# Patient Record
Sex: MALE | Race: WHITE | NOT HISPANIC OR LATINO | Employment: OTHER | URBAN - NONMETROPOLITAN AREA
[De-identification: names, ages, dates, MRNs, and addresses within clinical notes are randomized per-mention and may not be internally consistent; named-entity substitution may affect disease eponyms.]

---

## 2017-04-12 ENCOUNTER — OFFICE VISIT (OUTPATIENT)
Dept: CARDIOLOGY | Facility: CLINIC | Age: 72
End: 2017-04-12

## 2017-04-12 VITALS
BODY MASS INDEX: 29.92 KG/M2 | WEIGHT: 209 LBS | HEIGHT: 70 IN | HEART RATE: 86 BPM | OXYGEN SATURATION: 98 % | DIASTOLIC BLOOD PRESSURE: 82 MMHG | SYSTOLIC BLOOD PRESSURE: 138 MMHG

## 2017-04-12 DIAGNOSIS — I10 ESSENTIAL HYPERTENSION: Primary | ICD-10-CM

## 2017-04-12 DIAGNOSIS — G54.0 THORACIC OUTLET SYNDROME: ICD-10-CM

## 2017-04-12 DIAGNOSIS — G45.4 TRANSIENT GLOBAL AMNESIA: ICD-10-CM

## 2017-04-12 DIAGNOSIS — R06.09 DOE (DYSPNEA ON EXERTION): ICD-10-CM

## 2017-04-12 DIAGNOSIS — R06.02 SHORTNESS OF BREATH: ICD-10-CM

## 2017-04-12 DIAGNOSIS — Z82.49 FAMILY HISTORY OF EARLY CAD: ICD-10-CM

## 2017-04-12 PROCEDURE — 99204 OFFICE O/P NEW MOD 45 MIN: CPT | Performed by: INTERNAL MEDICINE

## 2017-04-12 PROCEDURE — 93000 ELECTROCARDIOGRAM COMPLETE: CPT | Performed by: INTERNAL MEDICINE

## 2017-04-12 RX ORDER — CLOPIDOGREL BISULFATE 75 MG/1
75 TABLET ORAL DAILY
COMMUNITY

## 2017-04-12 RX ORDER — PANTOPRAZOLE SODIUM 40 MG/1
40 TABLET, DELAYED RELEASE ORAL DAILY
Status: ON HOLD | COMMUNITY
End: 2022-08-06

## 2017-04-12 RX ORDER — VALSARTAN 80 MG/1
80 TABLET ORAL DAILY
Status: ON HOLD | COMMUNITY
End: 2022-08-05

## 2017-04-12 NOTE — PROGRESS NOTES
Gallo Philip  [id number]  1945  72 y.o.  male    Referring Provider: Javier Gan MD    Reason for  Visit: Shortness of breath    C/o B/L arm heaviness on exertion mostly on raising his arms up  Overall doing well  Denies any chest pain  No excessive shortness of breath  Compliant with medications    History of present illness:  Gallo Philip is a 72 y.o. yo male with history of HTN  who presents today for   Chief Complaint   Patient presents with   • Shortness of Breath     NEW PT    • Fatigue   .    History  Past Medical History:   Diagnosis Date   • Hypertension    • Shortness of breath    • Stroke     2013 - ON PLAVIX   ,   Past Surgical History:   Procedure Laterality Date   • APPENDECTOMY     • KIDNEY STONE SURGERY     ,   Family History   Problem Relation Age of Onset   • No Known Problems Mother    • Heart attack Father    • Heart disease Father    ,   Social History   Substance Use Topics   • Smoking status: Former Smoker   • Smokeless tobacco: None   • Alcohol use No   ,     Medications  Current Outpatient Prescriptions   Medication Sig Dispense Refill   • aspirin 81 MG tablet Take 81 mg by mouth Daily.     • clopidogrel (PLAVIX) 75 MG tablet Take 75 mg by mouth Daily.     • pantoprazole (PROTONIX) 40 MG EC tablet Take 40 mg by mouth Daily.     • Triamterene-HCTZ (DYAZIDE PO) Take  by mouth.     • valsartan (DIOVAN) 80 MG tablet Take 80 mg by mouth Daily.       No current facility-administered medications for this visit.        Allergies:  Review of patient's allergies indicates no known allergies.    Review of Systems  Review of Systems   Constitution: Negative.   HENT: Negative.    Eyes: Negative.    Cardiovascular: Positive for dyspnea on exertion. Negative for chest pain, claudication, cyanosis, irregular heartbeat, leg swelling, near-syncope, orthopnea, palpitations, paroxysmal nocturnal dyspnea and syncope.   Respiratory: Negative.    Endocrine: Negative.    Hematologic/Lymphatic: Negative.   "  Skin: Negative.    Musculoskeletal: Positive for arthritis and joint pain.   Gastrointestinal: Negative for anorexia.   Genitourinary: Negative.    Neurological: Negative.    Psychiatric/Behavioral: Negative.        Objective     Physical Exam:  /82  Pulse 86  Ht 70\" (177.8 cm)  Wt 209 lb (94.8 kg)  SpO2 98%  BMI 29.99 kg/m2  Physical Exam   Constitutional: He appears well-developed.   HENT:   Head: Normocephalic.   Neck: Normal carotid pulses and no JVD present. No tracheal tenderness present. Carotid bruit is not present. No tracheal deviation and no edema present.   Cardiovascular: Regular rhythm, normal heart sounds and normal pulses.    Pulmonary/Chest: Effort normal. No stridor.   Abdominal: Soft.   Neurological: He is alert. He has normal strength. No cranial nerve deficit or sensory deficit.   Skin: Skin is warm.   Psychiatric: He has a normal mood and affect. His speech is normal and behavior is normal.       Results Review:       ECG 12 Lead  Date/Time: 4/12/2017 8:42 AM  Performed by: SHANTHI TORRE  Authorized by: SHANTHI TORRE   Comparison: not compared with previous ECG   Rhythm: sinus rhythm  Rate: normal  Conduction: conduction normal  ST Segments: ST segments normal  T Waves: T waves normal  QRS axis: normal  Clinical impression: normal ECG            Assessment/Plan   Patient Active Problem List   Diagnosis   • Hypertension   • Shortness of breath   • Family history of early CAD   • Transient global amnesia   • Thoracic outlet syndrome       No palpitations. No significant pedal edema. Compliant with medications and diet. Latest labs and medications reviewed.    Plan:  Will get echo and DSE  Will refer to vascular and neurology in future for likely thoracic outlet obstruction  Close follow up with you as scheduled.  Intensive factor modifications.  See order list.    Counseled regarding disease appropriate diet, fluid, caffeine, stimulants and sodium intake as well as importance of " compliance to diet, exercise and regular follow up.  Avoid NSAIDS and COX2 inhibitors. Use Acetaminophen PRN.    Return in about 4 weeks (around 5/10/2017).

## 2017-04-26 ENCOUNTER — HOSPITAL ENCOUNTER (OUTPATIENT)
Dept: CARDIOLOGY | Facility: HOSPITAL | Age: 72
Discharge: HOME | End: 2017-04-26
Attending: INTERNAL MEDICINE

## 2017-04-26 ENCOUNTER — HOSPITAL ENCOUNTER (OUTPATIENT)
Dept: CARDIOLOGY | Facility: HOSPITAL | Age: 72
Discharge: HOME | End: 2017-04-26
Attending: INTERNAL MEDICINE | Admitting: INTERNAL MEDICINE

## 2017-04-26 VITALS — DIASTOLIC BLOOD PRESSURE: 75 MMHG | SYSTOLIC BLOOD PRESSURE: 135 MMHG | HEART RATE: 73 BPM

## 2017-04-26 VITALS
HEIGHT: 70 IN | WEIGHT: 209 LBS | DIASTOLIC BLOOD PRESSURE: 72 MMHG | SYSTOLIC BLOOD PRESSURE: 126 MMHG | BODY MASS INDEX: 29.92 KG/M2

## 2017-04-26 DIAGNOSIS — R06.09 DOE (DYSPNEA ON EXERTION): ICD-10-CM

## 2017-04-26 PROCEDURE — 93018 CV STRESS TEST I&R ONLY: CPT | Performed by: INTERNAL MEDICINE

## 2017-04-26 PROCEDURE — 93352 ADMIN ECG CONTRAST AGENT: CPT | Performed by: INTERNAL MEDICINE

## 2017-04-26 PROCEDURE — C8928 TTE W OR W/O FOL W/CON,STRES: HCPCS

## 2017-04-26 PROCEDURE — 93017 CV STRESS TEST TRACING ONLY: CPT

## 2017-04-26 PROCEDURE — 25010000003 DOBUTAMINE PER 250 MG: Performed by: INTERNAL MEDICINE

## 2017-04-26 PROCEDURE — C8929 TTE W OR WO FOL WCON,DOPPLER: HCPCS

## 2017-04-26 PROCEDURE — 25010000002 PERFLUTREN (DEFINITY) 8.476 MG IN SODIUM CHLORIDE 10 ML INJECTION: Performed by: INTERNAL MEDICINE

## 2017-04-26 PROCEDURE — 93350 STRESS TTE ONLY: CPT | Performed by: INTERNAL MEDICINE

## 2017-04-26 RX ORDER — DOBUTAMINE HYDROCHLORIDE 100 MG/100ML
10-50 INJECTION INTRAVENOUS
Status: DISCONTINUED | OUTPATIENT
Start: 2017-04-26 | End: 2017-04-27 | Stop reason: HOSPADM

## 2017-04-26 RX ADMIN — DOBUTAMINE HYDROCHLORIDE 10 MCG/KG/MIN: 100 INJECTION INTRAVENOUS at 10:21

## 2017-04-26 RX ADMIN — SODIUM CHLORIDE 10 ML: 9 INJECTION INTRAMUSCULAR; INTRAVENOUS; SUBCUTANEOUS at 09:40

## 2017-04-27 LAB
BH CV STRESS BP STAGE 1: NORMAL
BH CV STRESS BP STAGE 2: NORMAL
BH CV STRESS DOSE DOBUTAMINE STAGE 1: 10
BH CV STRESS DOSE DOBUTAMINE STAGE 2: 20
BH CV STRESS DURATION MIN STAGE 1: 3
BH CV STRESS DURATION MIN STAGE 2: 3
BH CV STRESS DURATION SEC STAGE 1: 0
BH CV STRESS DURATION SEC STAGE 2: 54
BH CV STRESS ECHO POST STRESS EJECTION FRACTION EF: 60 %
BH CV STRESS HR STAGE 1: 93
BH CV STRESS HR STAGE 2: 131
BH CV STRESS PROTOCOL 1: NORMAL
BH CV STRESS RECOVERY BP: NORMAL MMHG
BH CV STRESS RECOVERY HR: 96 BPM
BH CV STRESS STAGE 1: 1
BH CV STRESS STAGE 2: 2
LV EF 2D ECHO EST: 55 %
MAXIMAL PREDICTED HEART RATE: 150 BPM
PERCENT MAX PREDICTED HR: 87.33 %
STRESS BASELINE BP: NORMAL MMHG
STRESS BASELINE HR: 73 BPM
STRESS PERCENT HR: 103 %
STRESS POST EXERCISE DUR MIN: 6 MIN
STRESS POST EXERCISE DUR SEC: 54 SEC
STRESS POST PEAK BP: NORMAL MMHG
STRESS POST PEAK HR: 131 BPM
STRESS TARGET HR: 128 BPM

## 2017-04-29 LAB
BH CV ECHO MEAS - AO MAX PG (FULL): 5.4 MMHG
BH CV ECHO MEAS - AO MAX PG: 10.4 MMHG
BH CV ECHO MEAS - AO MEAN PG (FULL): 3 MMHG
BH CV ECHO MEAS - AO MEAN PG: 6 MMHG
BH CV ECHO MEAS - AO ROOT AREA (BSA CORRECTED): 1.6
BH CV ECHO MEAS - AO ROOT AREA: 8.6 CM^2
BH CV ECHO MEAS - AO ROOT DIAM: 3.3 CM
BH CV ECHO MEAS - AO V2 MAX: 161 CM/SEC
BH CV ECHO MEAS - AO V2 MEAN: 112 CM/SEC
BH CV ECHO MEAS - AO V2 VTI: 33.1 CM
BH CV ECHO MEAS - AVA(I,A): 2.7 CM^2
BH CV ECHO MEAS - AVA(I,D): 2.7 CM^2
BH CV ECHO MEAS - AVA(V,A): 2.9 CM^2
BH CV ECHO MEAS - AVA(V,D): 2.9 CM^2
BH CV ECHO MEAS - BSA(HAYCOCK): 2.2 M^2
BH CV ECHO MEAS - BSA: 2.1 M^2
BH CV ECHO MEAS - BZI_BMI: 30 KILOGRAMS/M^2
BH CV ECHO MEAS - BZI_METRIC_HEIGHT: 177.8 CM
BH CV ECHO MEAS - BZI_METRIC_WEIGHT: 94.8 KG
BH CV ECHO MEAS - CONTRAST EF 4CH: 61.3 ML/M^2
BH CV ECHO MEAS - EDV(CUBED): 64 ML
BH CV ECHO MEAS - EDV(MOD-SP4): 98.8 ML
BH CV ECHO MEAS - EDV(TEICH): 70 ML
BH CV ECHO MEAS - EF(CUBED): 57.8 %
BH CV ECHO MEAS - EF(MOD-SP4): 61.3 %
BH CV ECHO MEAS - EF(TEICH): 50 %
BH CV ECHO MEAS - ESV(CUBED): 27 ML
BH CV ECHO MEAS - ESV(MOD-SP4): 38.2 ML
BH CV ECHO MEAS - ESV(TEICH): 35 ML
BH CV ECHO MEAS - FS: 25 %
BH CV ECHO MEAS - IVS/LVPW: 1
BH CV ECHO MEAS - IVSD: 1.2 CM
BH CV ECHO MEAS - LA DIMENSION: 3.6 CM
BH CV ECHO MEAS - LA/AO: 1.1
BH CV ECHO MEAS - LAT PEAK E' VEL: 9.6 CM/SEC
BH CV ECHO MEAS - LV DIASTOLIC VOL/BSA (35-75): 46.5 ML/M^2
BH CV ECHO MEAS - LV MASS(C)D: 165.5 GRAMS
BH CV ECHO MEAS - LV MASS(C)DI: 77.8 GRAMS/M^2
BH CV ECHO MEAS - LV MAX PG: 5 MMHG
BH CV ECHO MEAS - LV MEAN PG: 3 MMHG
BH CV ECHO MEAS - LV SYSTOLIC VOL/BSA (12-30): 18 ML/M^2
BH CV ECHO MEAS - LV V1 MAX: 112 CM/SEC
BH CV ECHO MEAS - LV V1 MEAN: 76.2 CM/SEC
BH CV ECHO MEAS - LV V1 VTI: 21.9 CM
BH CV ECHO MEAS - LVIDD: 4 CM
BH CV ECHO MEAS - LVIDS: 3 CM
BH CV ECHO MEAS - LVLD AP4: 7.9 CM
BH CV ECHO MEAS - LVLS AP4: 6.3 CM
BH CV ECHO MEAS - LVOT AREA (M): 4.2 CM^2
BH CV ECHO MEAS - LVOT AREA: 4.2 CM^2
BH CV ECHO MEAS - LVOT DIAM: 2.3 CM
BH CV ECHO MEAS - LVPWD: 1.2 CM
BH CV ECHO MEAS - MED PEAK E' VEL: 5.11 CM/SEC
BH CV ECHO MEAS - MV A MAX VEL: 97 CM/SEC
BH CV ECHO MEAS - MV DEC TIME: 0.27 SEC
BH CV ECHO MEAS - MV E MAX VEL: 78.6 CM/SEC
BH CV ECHO MEAS - MV E/A: 0.81
BH CV ECHO MEAS - RAP SYSTOLE: 5 MMHG
BH CV ECHO MEAS - RVSP: 12.2 MMHG
BH CV ECHO MEAS - SI(AO): 133.1 ML/M^2
BH CV ECHO MEAS - SI(CUBED): 17.4 ML/M^2
BH CV ECHO MEAS - SI(LVOT): 42.8 ML/M^2
BH CV ECHO MEAS - SI(MOD-SP4): 28.5 ML/M^2
BH CV ECHO MEAS - SI(TEICH): 16.5 ML/M^2
BH CV ECHO MEAS - SV(AO): 283.1 ML
BH CV ECHO MEAS - SV(CUBED): 37 ML
BH CV ECHO MEAS - SV(LVOT): 91 ML
BH CV ECHO MEAS - SV(MOD-SP4): 60.6 ML
BH CV ECHO MEAS - SV(TEICH): 35 ML
BH CV ECHO MEAS - TR MAX VEL: 134 CM/SEC
E/E' RATIO: 15.4
LEFT ATRIUM VOLUME INDEX: 32 ML/M2
LEFT ATRIUM VOLUME: 68.1 CM3
LV EF 2D ECHO EST: 60 %

## 2017-05-12 ENCOUNTER — OFFICE VISIT (OUTPATIENT)
Dept: CARDIOLOGY | Facility: CLINIC | Age: 72
End: 2017-05-12

## 2017-05-12 VITALS
HEIGHT: 70 IN | OXYGEN SATURATION: 98 % | WEIGHT: 207 LBS | HEART RATE: 67 BPM | SYSTOLIC BLOOD PRESSURE: 114 MMHG | BODY MASS INDEX: 29.63 KG/M2 | DIASTOLIC BLOOD PRESSURE: 72 MMHG

## 2017-05-12 DIAGNOSIS — G54.0 THORACIC OUTLET SYNDROME: ICD-10-CM

## 2017-05-12 DIAGNOSIS — I10 ESSENTIAL HYPERTENSION: Primary | ICD-10-CM

## 2017-05-12 DIAGNOSIS — Z82.49 FAMILY HISTORY OF EARLY CAD: ICD-10-CM

## 2017-05-12 DIAGNOSIS — G45.4 TRANSIENT GLOBAL AMNESIA: ICD-10-CM

## 2017-05-12 DIAGNOSIS — R06.02 SHORTNESS OF BREATH: ICD-10-CM

## 2017-05-12 PROCEDURE — 99214 OFFICE O/P EST MOD 30 MIN: CPT | Performed by: INTERNAL MEDICINE

## 2017-12-21 ENCOUNTER — HOSPITAL ENCOUNTER (OUTPATIENT)
Dept: GENERAL RADIOLOGY | Age: 72
Discharge: HOME | End: 2017-12-21
Payer: MEDICARE

## 2017-12-21 ENCOUNTER — HOSPITAL ENCOUNTER (OUTPATIENT)
Dept: PREADMISSION TESTING | Age: 72
Discharge: HOME | End: 2017-12-21
Payer: MEDICARE

## 2017-12-21 VITALS — WEIGHT: 210 LBS | HEIGHT: 70 IN | BODY MASS INDEX: 30.06 KG/M2

## 2017-12-21 LAB
ALBUMIN SERPL-MCNC: 4.4 G/DL (ref 3.5–5.2)
ALP BLD-CCNC: 88 U/L (ref 40–130)
ALT SERPL-CCNC: 29 U/L (ref 5–41)
ANION GAP SERPL CALCULATED.3IONS-SCNC: 16 MMOL/L (ref 7–19)
APTT: 25 SEC (ref 26–36.2)
AST SERPL-CCNC: 21 U/L (ref 5–40)
BASOPHILS ABSOLUTE: 0.1 K/UL (ref 0–0.2)
BASOPHILS RELATIVE PERCENT: 0.9 % (ref 0–1)
BILIRUB SERPL-MCNC: 0.3 MG/DL (ref 0.2–1.2)
BILIRUBIN URINE: NEGATIVE
BLOOD, URINE: NEGATIVE
BUN BLDV-MCNC: 36 MG/DL (ref 8–23)
CALCIUM SERPL-MCNC: 9.8 MG/DL (ref 8.8–10.2)
CHLORIDE BLD-SCNC: 100 MMOL/L (ref 98–111)
CLARITY: CLEAR
CO2: 23 MMOL/L (ref 22–29)
COLOR: YELLOW
CREAT SERPL-MCNC: 1.7 MG/DL (ref 0.5–1.2)
EOSINOPHILS ABSOLUTE: 0.7 K/UL (ref 0–0.6)
EOSINOPHILS RELATIVE PERCENT: 5.8 % (ref 0–5)
GFR NON-AFRICAN AMERICAN: 40
GLUCOSE BLD-MCNC: 118 MG/DL (ref 74–109)
GLUCOSE URINE: NEGATIVE MG/DL
HCT VFR BLD CALC: 45.8 % (ref 42–52)
HEMOGLOBIN: 15.2 G/DL (ref 14–18)
INR BLD: 0.89 (ref 0.88–1.18)
KETONES, URINE: NEGATIVE MG/DL
LEUKOCYTE ESTERASE, URINE: NEGATIVE
LYMPHOCYTES ABSOLUTE: 3.2 K/UL (ref 1.1–4.5)
LYMPHOCYTES RELATIVE PERCENT: 26.7 % (ref 20–40)
MCH RBC QN AUTO: 31.5 PG (ref 27–31)
MCHC RBC AUTO-ENTMCNC: 33.2 G/DL (ref 33–37)
MCV RBC AUTO: 94.8 FL (ref 80–94)
MONOCYTES ABSOLUTE: 1.1 K/UL (ref 0–0.9)
MONOCYTES RELATIVE PERCENT: 9.5 % (ref 0–10)
NEUTROPHILS ABSOLUTE: 6.8 K/UL (ref 1.5–7.5)
NEUTROPHILS RELATIVE PERCENT: 56.8 % (ref 50–65)
NITRITE, URINE: NEGATIVE
PDW BLD-RTO: 12 % (ref 11.5–14.5)
PH UA: 5.5
PLATELET # BLD: 339 K/UL (ref 130–400)
PMV BLD AUTO: 10.7 FL (ref 9.4–12.4)
POTASSIUM SERPL-SCNC: 3.8 MMOL/L (ref 3.5–5)
PROTEIN UA: NEGATIVE MG/DL
PROTHROMBIN TIME: 11.9 SEC (ref 12–14.6)
RBC # BLD: 4.83 M/UL (ref 4.7–6.1)
SODIUM BLD-SCNC: 139 MMOL/L (ref 136–145)
SPECIFIC GRAVITY UA: 1.02
TOTAL PROTEIN: 7.6 G/DL (ref 6.6–8.7)
UROBILINOGEN, URINE: 0.2 E.U./DL
WBC # BLD: 12 K/UL (ref 4.8–10.8)

## 2017-12-21 PROCEDURE — 85730 THROMBOPLASTIN TIME PARTIAL: CPT

## 2017-12-21 PROCEDURE — 93005 ELECTROCARDIOGRAM TRACING: CPT

## 2017-12-21 PROCEDURE — 85610 PROTHROMBIN TIME: CPT

## 2017-12-21 PROCEDURE — 85025 COMPLETE CBC W/AUTO DIFF WBC: CPT

## 2017-12-21 PROCEDURE — 80053 COMPREHEN METABOLIC PANEL: CPT

## 2017-12-21 PROCEDURE — 81003 URINALYSIS AUTO W/O SCOPE: CPT

## 2017-12-21 PROCEDURE — 71020 XR CHEST STANDARD TWO VW: CPT

## 2017-12-21 PROCEDURE — 87070 CULTURE OTHR SPECIMN AEROBIC: CPT

## 2017-12-21 RX ORDER — CELECOXIB 200 MG/1
200 CAPSULE ORAL ONCE
Status: CANCELLED | OUTPATIENT
Start: 2018-01-03

## 2017-12-21 RX ORDER — DEXAMETHASONE SODIUM PHOSPHATE 10 MG/ML
10 INJECTION INTRAMUSCULAR; INTRAVENOUS ONCE
Status: CANCELLED | OUTPATIENT
Start: 2018-01-03

## 2017-12-21 RX ORDER — VALSARTAN 80 MG/1
80 TABLET ORAL DAILY
COMMUNITY
End: 2021-04-30

## 2017-12-21 RX ORDER — PREGABALIN 75 MG/1
75 CAPSULE ORAL ONCE
Status: CANCELLED | OUTPATIENT
Start: 2018-01-03

## 2017-12-21 RX ORDER — CLOPIDOGREL BISULFATE 75 MG/1
75 TABLET ORAL DAILY
Status: ON HOLD | COMMUNITY
End: 2018-01-05 | Stop reason: HOSPADM

## 2017-12-21 RX ORDER — PANTOPRAZOLE SODIUM 40 MG/1
40 TABLET, DELAYED RELEASE ORAL DAILY
COMMUNITY
End: 2021-04-30

## 2017-12-21 RX ORDER — OXYCODONE HCL 10 MG/1
10 TABLET, FILM COATED, EXTENDED RELEASE ORAL ONCE
Status: CANCELLED | OUTPATIENT
Start: 2018-01-03

## 2017-12-21 RX ORDER — ACETAMINOPHEN 500 MG
1000 TABLET ORAL ONCE
Status: CANCELLED | OUTPATIENT
Start: 2018-01-03

## 2017-12-22 LAB
EKG P AXIS: 61 DEGREES
EKG P-R INTERVAL: 182 MS
EKG Q-T INTERVAL: 362 MS
EKG QRS DURATION: 86 MS
EKG QTC CALCULATION (BAZETT): 388 MS
EKG T AXIS: 71 DEGREES
MRSA CULTURE ONLY: NORMAL

## 2018-01-01 PROBLEM — M17.12 PRIMARY OSTEOARTHRITIS OF LEFT KNEE: Status: ACTIVE | Noted: 2018-01-01

## 2018-01-03 ENCOUNTER — ANESTHESIA EVENT (OUTPATIENT)
Dept: OPERATING ROOM | Age: 73
DRG: 470 | End: 2018-01-03
Payer: MEDICARE

## 2018-01-03 ENCOUNTER — ANESTHESIA (OUTPATIENT)
Dept: OPERATING ROOM | Age: 73
DRG: 470 | End: 2018-01-03
Payer: MEDICARE

## 2018-01-03 ENCOUNTER — APPOINTMENT (OUTPATIENT)
Dept: GENERAL RADIOLOGY | Age: 73
DRG: 470 | End: 2018-01-03
Attending: ORTHOPAEDIC SURGERY
Payer: MEDICARE

## 2018-01-03 ENCOUNTER — HOSPITAL ENCOUNTER (INPATIENT)
Age: 73
LOS: 3 days | Discharge: HOME HEALTH | DRG: 470 | End: 2018-01-06
Attending: ORTHOPAEDIC SURGERY | Admitting: ORTHOPAEDIC SURGERY
Payer: MEDICARE

## 2018-01-03 VITALS — SYSTOLIC BLOOD PRESSURE: 97 MMHG | OXYGEN SATURATION: 99 % | TEMPERATURE: 96.8 F | DIASTOLIC BLOOD PRESSURE: 58 MMHG

## 2018-01-03 PROBLEM — M17.10 ARTHRITIS OF KNEE: Status: ACTIVE | Noted: 2018-01-03

## 2018-01-03 LAB
ABO/RH: NORMAL
ANTIBODY SCREEN: NORMAL

## 2018-01-03 PROCEDURE — 3700000000 HC ANESTHESIA ATTENDED CARE: Performed by: ORTHOPAEDIC SURGERY

## 2018-01-03 PROCEDURE — C9290 INJ, BUPIVACAINE LIPOSOME: HCPCS | Performed by: ORTHOPAEDIC SURGERY

## 2018-01-03 PROCEDURE — 2720000001 HC MISC SURG SUPPLY STERILE $51-500: Performed by: ORTHOPAEDIC SURGERY

## 2018-01-03 PROCEDURE — 86901 BLOOD TYPING SEROLOGIC RH(D): CPT

## 2018-01-03 PROCEDURE — 73560 X-RAY EXAM OF KNEE 1 OR 2: CPT

## 2018-01-03 PROCEDURE — 7100000001 HC PACU RECOVERY - ADDTL 15 MIN: Performed by: ORTHOPAEDIC SURGERY

## 2018-01-03 PROCEDURE — 6360000002 HC RX W HCPCS: Performed by: ORTHOPAEDIC SURGERY

## 2018-01-03 PROCEDURE — 3700000001 HC ADD 15 MINUTES (ANESTHESIA): Performed by: ORTHOPAEDIC SURGERY

## 2018-01-03 PROCEDURE — C1776 JOINT DEVICE (IMPLANTABLE): HCPCS | Performed by: ORTHOPAEDIC SURGERY

## 2018-01-03 PROCEDURE — 64447 NJX AA&/STRD FEMORAL NRV IMG: CPT

## 2018-01-03 PROCEDURE — 2500000003 HC RX 250 WO HCPCS: Performed by: ORTHOPAEDIC SURGERY

## 2018-01-03 PROCEDURE — 2700000000 HC OXYGEN THERAPY PER DAY

## 2018-01-03 PROCEDURE — C1713 ANCHOR/SCREW BN/BN,TIS/BN: HCPCS | Performed by: ORTHOPAEDIC SURGERY

## 2018-01-03 PROCEDURE — 86850 RBC ANTIBODY SCREEN: CPT

## 2018-01-03 PROCEDURE — 6370000000 HC RX 637 (ALT 250 FOR IP): Performed by: ORTHOPAEDIC SURGERY

## 2018-01-03 PROCEDURE — 3E0U33Z INTRODUCTION OF ANTI-INFLAMMATORY INTO JOINTS, PERCUTANEOUS APPROACH: ICD-10-PCS | Performed by: ORTHOPAEDIC SURGERY

## 2018-01-03 PROCEDURE — 2780000010 HC IMPLANT OTHER: Performed by: ORTHOPAEDIC SURGERY

## 2018-01-03 PROCEDURE — 2500000003 HC RX 250 WO HCPCS

## 2018-01-03 PROCEDURE — 6360000002 HC RX W HCPCS: Performed by: ANESTHESIOLOGY

## 2018-01-03 PROCEDURE — 0SRD0J9 REPLACEMENT OF LEFT KNEE JOINT WITH SYNTHETIC SUBSTITUTE, CEMENTED, OPEN APPROACH: ICD-10-PCS | Performed by: ORTHOPAEDIC SURGERY

## 2018-01-03 PROCEDURE — 3600000005 HC SURGERY LEVEL 5 BASE: Performed by: ORTHOPAEDIC SURGERY

## 2018-01-03 PROCEDURE — 3600000015 HC SURGERY LEVEL 5 ADDTL 15MIN: Performed by: ORTHOPAEDIC SURGERY

## 2018-01-03 PROCEDURE — 94664 DEMO&/EVAL PT USE INHALER: CPT

## 2018-01-03 PROCEDURE — 2580000003 HC RX 258: Performed by: ORTHOPAEDIC SURGERY

## 2018-01-03 PROCEDURE — 6360000002 HC RX W HCPCS

## 2018-01-03 PROCEDURE — 36415 COLL VENOUS BLD VENIPUNCTURE: CPT

## 2018-01-03 PROCEDURE — 7100000000 HC PACU RECOVERY - FIRST 15 MIN: Performed by: ORTHOPAEDIC SURGERY

## 2018-01-03 PROCEDURE — 1210000000 HC MED SURG R&B

## 2018-01-03 PROCEDURE — 86900 BLOOD TYPING SEROLOGIC ABO: CPT

## 2018-01-03 DEVICE — COMPONENT ARTC SURF PS 10-11 EF 10 MM LT TIB FIX BEAR: Type: IMPLANTABLE DEVICE | Site: KNEE | Status: FUNCTIONAL

## 2018-01-03 DEVICE — Z DUP USE 2508610 EXTENSION STEM SZ F 5DEG L TIBL KNEE CEM NAT TIB PERSONA: Type: IMPLANTABLE DEVICE | Site: KNEE | Status: FUNCTIONAL

## 2018-01-03 DEVICE — IMPL KNEE PSN ALL POLY PAT 38MM: Type: IMPLANTABLE DEVICE | Site: KNEE | Status: FUNCTIONAL

## 2018-01-03 DEVICE — DISCONTINUED USE 415964 CEMENT PALACOS R + G SING DOSE 40GR: Type: IMPLANTABLE DEVICE | Site: KNEE | Status: FUNCTIONAL

## 2018-01-03 DEVICE — AGENT HEMSTAT HUM FBRN SEAL EVICEL KT: Type: IMPLANTABLE DEVICE | Site: KNEE | Status: FUNCTIONAL

## 2018-01-03 DEVICE — COMPONENT FEM SZ 11 NAR L KNEE CO CHROM CEM POST STBL MID: Type: IMPLANTABLE DEVICE | Site: KNEE | Status: FUNCTIONAL

## 2018-01-03 RX ORDER — MORPHINE SULFATE 4 MG/ML
4 INJECTION, SOLUTION INTRAMUSCULAR; INTRAVENOUS
Status: DISCONTINUED | OUTPATIENT
Start: 2018-01-03 | End: 2018-01-03 | Stop reason: HOSPADM

## 2018-01-03 RX ORDER — DIPHENHYDRAMINE HCL 25 MG
25 TABLET ORAL EVERY 6 HOURS PRN
Status: DISCONTINUED | OUTPATIENT
Start: 2018-01-03 | End: 2018-01-06 | Stop reason: HOSPADM

## 2018-01-03 RX ORDER — SODIUM CHLORIDE 0.9 % (FLUSH) 0.9 %
10 SYRINGE (ML) INJECTION PRN
Status: DISCONTINUED | OUTPATIENT
Start: 2018-01-03 | End: 2018-01-06 | Stop reason: HOSPADM

## 2018-01-03 RX ORDER — FENTANYL CITRATE 50 UG/ML
INJECTION, SOLUTION INTRAMUSCULAR; INTRAVENOUS PRN
Status: DISCONTINUED | OUTPATIENT
Start: 2018-01-03 | End: 2018-01-03 | Stop reason: SDUPTHER

## 2018-01-03 RX ORDER — DEXAMETHASONE SODIUM PHOSPHATE 10 MG/ML
10 INJECTION INTRAMUSCULAR; INTRAVENOUS ONCE
Status: DISCONTINUED | OUTPATIENT
Start: 2018-01-03 | End: 2018-01-03 | Stop reason: HOSPADM

## 2018-01-03 RX ORDER — 0.9 % SODIUM CHLORIDE 0.9 %
500 INTRAVENOUS SOLUTION INTRAVENOUS PRN
Status: DISCONTINUED | OUTPATIENT
Start: 2018-01-03 | End: 2018-01-06 | Stop reason: HOSPADM

## 2018-01-03 RX ORDER — FENTANYL CITRATE 50 UG/ML
75 INJECTION, SOLUTION INTRAMUSCULAR; INTRAVENOUS
Status: DISCONTINUED | OUTPATIENT
Start: 2018-01-03 | End: 2018-01-06 | Stop reason: HOSPADM

## 2018-01-03 RX ORDER — SODIUM CHLORIDE, SODIUM LACTATE, POTASSIUM CHLORIDE, CALCIUM CHLORIDE 600; 310; 30; 20 MG/100ML; MG/100ML; MG/100ML; MG/100ML
INJECTION, SOLUTION INTRAVENOUS CONTINUOUS
Status: DISCONTINUED | OUTPATIENT
Start: 2018-01-03 | End: 2018-01-06 | Stop reason: HOSPADM

## 2018-01-03 RX ORDER — OXYCODONE HYDROCHLORIDE 5 MG/1
10 TABLET ORAL EVERY 4 HOURS PRN
Status: DISCONTINUED | OUTPATIENT
Start: 2018-01-03 | End: 2018-01-06 | Stop reason: HOSPADM

## 2018-01-03 RX ORDER — DOCUSATE SODIUM 100 MG/1
100 CAPSULE, LIQUID FILLED ORAL 2 TIMES DAILY
Status: DISCONTINUED | OUTPATIENT
Start: 2018-01-03 | End: 2018-01-06 | Stop reason: HOSPADM

## 2018-01-03 RX ORDER — PROPOFOL 10 MG/ML
INJECTION, EMULSION INTRAVENOUS CONTINUOUS PRN
Status: DISCONTINUED | OUTPATIENT
Start: 2018-01-03 | End: 2018-01-03 | Stop reason: SDUPTHER

## 2018-01-03 RX ORDER — DIPHENHYDRAMINE HYDROCHLORIDE 50 MG/ML
12.5 INJECTION INTRAMUSCULAR; INTRAVENOUS
Status: DISCONTINUED | OUTPATIENT
Start: 2018-01-03 | End: 2018-01-03 | Stop reason: HOSPADM

## 2018-01-03 RX ORDER — OXYCODONE HYDROCHLORIDE 5 MG/1
20 TABLET ORAL EVERY 4 HOURS PRN
Status: DISCONTINUED | OUTPATIENT
Start: 2018-01-03 | End: 2018-01-06 | Stop reason: HOSPADM

## 2018-01-03 RX ORDER — HYDRALAZINE HYDROCHLORIDE 20 MG/ML
5 INJECTION INTRAMUSCULAR; INTRAVENOUS EVERY 10 MIN PRN
Status: DISCONTINUED | OUTPATIENT
Start: 2018-01-03 | End: 2018-01-03 | Stop reason: HOSPADM

## 2018-01-03 RX ORDER — LIDOCAINE HYDROCHLORIDE 10 MG/ML
1 INJECTION, SOLUTION EPIDURAL; INFILTRATION; INTRACAUDAL; PERINEURAL
Status: DISCONTINUED | OUTPATIENT
Start: 2018-01-03 | End: 2018-01-03 | Stop reason: HOSPADM

## 2018-01-03 RX ORDER — HYDROMORPHONE HCL 110MG/55ML
2 PATIENT CONTROLLED ANALGESIA SYRINGE INTRAVENOUS
Status: DISCONTINUED | OUTPATIENT
Start: 2018-01-03 | End: 2018-01-03 | Stop reason: ALTCHOICE

## 2018-01-03 RX ORDER — LABETALOL HYDROCHLORIDE 5 MG/ML
5 INJECTION, SOLUTION INTRAVENOUS EVERY 10 MIN PRN
Status: DISCONTINUED | OUTPATIENT
Start: 2018-01-03 | End: 2018-01-03 | Stop reason: HOSPADM

## 2018-01-03 RX ORDER — EPHEDRINE SULFATE 50 MG/ML
INJECTION, SOLUTION INTRAVENOUS PRN
Status: DISCONTINUED | OUTPATIENT
Start: 2018-01-03 | End: 2018-01-03 | Stop reason: SDUPTHER

## 2018-01-03 RX ORDER — PROMETHAZINE HYDROCHLORIDE 25 MG/ML
6.25 INJECTION, SOLUTION INTRAMUSCULAR; INTRAVENOUS
Status: DISCONTINUED | OUTPATIENT
Start: 2018-01-03 | End: 2018-01-03 | Stop reason: HOSPADM

## 2018-01-03 RX ORDER — BETAMETHASONE SODIUM PHOSPHATE AND BETAMETHASONE ACETATE 3; 3 MG/ML; MG/ML
INJECTION, SUSPENSION INTRA-ARTICULAR; INTRALESIONAL; INTRAMUSCULAR; SOFT TISSUE PRN
Status: DISCONTINUED | OUTPATIENT
Start: 2018-01-03 | End: 2018-01-03 | Stop reason: HOSPADM

## 2018-01-03 RX ORDER — CLINDAMYCIN PHOSPHATE 900 MG/50ML
900 INJECTION INTRAVENOUS EVERY 8 HOURS
Status: COMPLETED | OUTPATIENT
Start: 2018-01-03 | End: 2018-01-05

## 2018-01-03 RX ORDER — SODIUM CHLORIDE 0.9 % (FLUSH) 0.9 %
10 SYRINGE (ML) INJECTION EVERY 12 HOURS SCHEDULED
Status: DISCONTINUED | OUTPATIENT
Start: 2018-01-03 | End: 2018-01-03 | Stop reason: HOSPADM

## 2018-01-03 RX ORDER — SODIUM CHLORIDE 0.9 % (FLUSH) 0.9 %
10 SYRINGE (ML) INJECTION EVERY 12 HOURS SCHEDULED
Status: DISCONTINUED | OUTPATIENT
Start: 2018-01-03 | End: 2018-01-06 | Stop reason: HOSPADM

## 2018-01-03 RX ORDER — ACETAMINOPHEN 500 MG
1000 TABLET ORAL ONCE
Status: COMPLETED | OUTPATIENT
Start: 2018-01-03 | End: 2018-01-03

## 2018-01-03 RX ORDER — ROPIVACAINE HYDROCHLORIDE 5 MG/ML
INJECTION, SOLUTION EPIDURAL; INFILTRATION; PERINEURAL PRN
Status: DISCONTINUED | OUTPATIENT
Start: 2018-01-03 | End: 2018-01-03 | Stop reason: SDUPTHER

## 2018-01-03 RX ORDER — ENALAPRILAT 2.5 MG/2ML
1.25 INJECTION INTRAVENOUS
Status: DISCONTINUED | OUTPATIENT
Start: 2018-01-03 | End: 2018-01-03 | Stop reason: HOSPADM

## 2018-01-03 RX ORDER — PREGABALIN 75 MG/1
75 CAPSULE ORAL ONCE
Status: COMPLETED | OUTPATIENT
Start: 2018-01-03 | End: 2018-01-03

## 2018-01-03 RX ORDER — FENTANYL CITRATE 50 UG/ML
50 INJECTION, SOLUTION INTRAMUSCULAR; INTRAVENOUS
Status: DISCONTINUED | OUTPATIENT
Start: 2018-01-03 | End: 2018-01-03 | Stop reason: HOSPADM

## 2018-01-03 RX ORDER — ACETAMINOPHEN 500 MG
1000 TABLET ORAL EVERY 8 HOURS
Status: DISCONTINUED | OUTPATIENT
Start: 2018-01-03 | End: 2018-01-06 | Stop reason: HOSPADM

## 2018-01-03 RX ORDER — LIDOCAINE HYDROCHLORIDE 10 MG/ML
INJECTION, SOLUTION EPIDURAL; INFILTRATION; INTRACAUDAL; PERINEURAL PRN
Status: DISCONTINUED | OUTPATIENT
Start: 2018-01-03 | End: 2018-01-03 | Stop reason: SDUPTHER

## 2018-01-03 RX ORDER — DIAZEPAM 5 MG/1
5 TABLET ORAL EVERY 6 HOURS PRN
Status: DISCONTINUED | OUTPATIENT
Start: 2018-01-03 | End: 2018-01-06 | Stop reason: HOSPADM

## 2018-01-03 RX ORDER — TAMSULOSIN HYDROCHLORIDE 0.4 MG/1
0.4 CAPSULE ORAL DAILY
Status: DISCONTINUED | OUTPATIENT
Start: 2018-01-03 | End: 2018-01-06 | Stop reason: HOSPADM

## 2018-01-03 RX ORDER — OXYCODONE HCL 10 MG/1
10 TABLET, FILM COATED, EXTENDED RELEASE ORAL EVERY 12 HOURS SCHEDULED
Status: DISCONTINUED | OUTPATIENT
Start: 2018-01-03 | End: 2018-01-06 | Stop reason: HOSPADM

## 2018-01-03 RX ORDER — MEPERIDINE HYDROCHLORIDE 50 MG/ML
12.5 INJECTION INTRAMUSCULAR; INTRAVENOUS; SUBCUTANEOUS EVERY 5 MIN PRN
Status: DISCONTINUED | OUTPATIENT
Start: 2018-01-03 | End: 2018-01-03 | Stop reason: HOSPADM

## 2018-01-03 RX ORDER — OXYCODONE HCL 10 MG/1
10 TABLET, FILM COATED, EXTENDED RELEASE ORAL ONCE
Status: COMPLETED | OUTPATIENT
Start: 2018-01-03 | End: 2018-01-03

## 2018-01-03 RX ORDER — MORPHINE SULFATE 4 MG/ML
4 INJECTION, SOLUTION INTRAMUSCULAR; INTRAVENOUS EVERY 5 MIN PRN
Status: DISCONTINUED | OUTPATIENT
Start: 2018-01-03 | End: 2018-01-03 | Stop reason: HOSPADM

## 2018-01-03 RX ORDER — OXYCODONE HYDROCHLORIDE 5 MG/1
5 TABLET ORAL EVERY 4 HOURS PRN
Status: DISCONTINUED | OUTPATIENT
Start: 2018-01-03 | End: 2018-01-06 | Stop reason: HOSPADM

## 2018-01-03 RX ORDER — DEXAMETHASONE SODIUM PHOSPHATE 10 MG/ML
INJECTION INTRAMUSCULAR; INTRAVENOUS PRN
Status: DISCONTINUED | OUTPATIENT
Start: 2018-01-03 | End: 2018-01-03 | Stop reason: SDUPTHER

## 2018-01-03 RX ORDER — METOCLOPRAMIDE HYDROCHLORIDE 5 MG/ML
10 INJECTION INTRAMUSCULAR; INTRAVENOUS
Status: DISCONTINUED | OUTPATIENT
Start: 2018-01-03 | End: 2018-01-03 | Stop reason: HOSPADM

## 2018-01-03 RX ORDER — BUPIVACAINE HYDROCHLORIDE 7.5 MG/ML
INJECTION, SOLUTION INTRASPINAL PRN
Status: DISCONTINUED | OUTPATIENT
Start: 2018-01-03 | End: 2018-01-03 | Stop reason: SDUPTHER

## 2018-01-03 RX ORDER — ONDANSETRON 2 MG/ML
INJECTION INTRAMUSCULAR; INTRAVENOUS PRN
Status: DISCONTINUED | OUTPATIENT
Start: 2018-01-03 | End: 2018-01-03 | Stop reason: SDUPTHER

## 2018-01-03 RX ORDER — TRAMADOL HYDROCHLORIDE 50 MG/1
50 TABLET ORAL EVERY 6 HOURS
Status: DISCONTINUED | OUTPATIENT
Start: 2018-01-03 | End: 2018-01-06 | Stop reason: HOSPADM

## 2018-01-03 RX ORDER — PANTOPRAZOLE SODIUM 40 MG/1
40 TABLET, DELAYED RELEASE ORAL DAILY
Status: DISCONTINUED | OUTPATIENT
Start: 2018-01-03 | End: 2018-01-06 | Stop reason: HOSPADM

## 2018-01-03 RX ORDER — MORPHINE SULFATE 4 MG/ML
2 INJECTION, SOLUTION INTRAMUSCULAR; INTRAVENOUS EVERY 5 MIN PRN
Status: DISCONTINUED | OUTPATIENT
Start: 2018-01-03 | End: 2018-01-03 | Stop reason: HOSPADM

## 2018-01-03 RX ORDER — SCOLOPAMINE TRANSDERMAL SYSTEM 1 MG/1
1 PATCH, EXTENDED RELEASE TRANSDERMAL ONCE
Status: DISCONTINUED | OUTPATIENT
Start: 2018-01-03 | End: 2018-01-03 | Stop reason: HOSPADM

## 2018-01-03 RX ORDER — FENTANYL CITRATE 50 UG/ML
50 INJECTION, SOLUTION INTRAMUSCULAR; INTRAVENOUS
Status: DISCONTINUED | OUTPATIENT
Start: 2018-01-03 | End: 2018-01-06 | Stop reason: HOSPADM

## 2018-01-03 RX ORDER — BUPIVACAINE HYDROCHLORIDE 2.5 MG/ML
INJECTION, SOLUTION INFILTRATION; PERINEURAL PRN
Status: DISCONTINUED | OUTPATIENT
Start: 2018-01-03 | End: 2018-01-03 | Stop reason: HOSPADM

## 2018-01-03 RX ORDER — ONDANSETRON 2 MG/ML
4 INJECTION INTRAMUSCULAR; INTRAVENOUS EVERY 6 HOURS PRN
Status: DISCONTINUED | OUTPATIENT
Start: 2018-01-03 | End: 2018-01-06 | Stop reason: HOSPADM

## 2018-01-03 RX ORDER — MIDAZOLAM HYDROCHLORIDE 1 MG/ML
2 INJECTION INTRAMUSCULAR; INTRAVENOUS
Status: COMPLETED | OUTPATIENT
Start: 2018-01-03 | End: 2018-01-03

## 2018-01-03 RX ORDER — SODIUM CHLORIDE 0.9 % (FLUSH) 0.9 %
10 SYRINGE (ML) INJECTION PRN
Status: DISCONTINUED | OUTPATIENT
Start: 2018-01-03 | End: 2018-01-03 | Stop reason: HOSPADM

## 2018-01-03 RX ORDER — CELECOXIB 200 MG/1
200 CAPSULE ORAL ONCE
Status: DISCONTINUED | OUTPATIENT
Start: 2018-01-03 | End: 2018-01-03

## 2018-01-03 RX ORDER — TRIAMTERENE AND HYDROCHLOROTHIAZIDE 37.5; 25 MG/1; MG/1
1 CAPSULE ORAL DAILY
Status: DISCONTINUED | OUTPATIENT
Start: 2018-01-03 | End: 2018-01-06 | Stop reason: HOSPADM

## 2018-01-03 RX ORDER — SODIUM CHLORIDE 9 MG/ML
INJECTION, SOLUTION INTRAVENOUS CONTINUOUS
Status: DISCONTINUED | OUTPATIENT
Start: 2018-01-03 | End: 2018-01-06 | Stop reason: HOSPADM

## 2018-01-03 RX ORDER — FENTANYL CITRATE 50 UG/ML
100 INJECTION, SOLUTION INTRAMUSCULAR; INTRAVENOUS
Status: DISCONTINUED | OUTPATIENT
Start: 2018-01-03 | End: 2018-01-06 | Stop reason: HOSPADM

## 2018-01-03 RX ADMIN — ACETAMINOPHEN 1000 MG: 500 TABLET ORAL at 07:07

## 2018-01-03 RX ADMIN — EPHEDRINE SULFATE 10 MG: 50 INJECTION, SOLUTION INTRAMUSCULAR; INTRAVENOUS; SUBCUTANEOUS at 09:53

## 2018-01-03 RX ADMIN — ROPIVACAINE HYDROCHLORIDE 20 ML: 5 INJECTION, SOLUTION EPIDURAL; INFILTRATION; PERINEURAL at 08:11

## 2018-01-03 RX ADMIN — PHENYLEPHRINE HYDROCHLORIDE 80 MCG: 10 INJECTION INTRAVENOUS at 09:14

## 2018-01-03 RX ADMIN — OXYCODONE HYDROCHLORIDE 10 MG: 5 TABLET ORAL at 16:54

## 2018-01-03 RX ADMIN — FENTANYL CITRATE 50 MCG: 50 INJECTION, SOLUTION INTRAMUSCULAR; INTRAVENOUS at 09:00

## 2018-01-03 RX ADMIN — OXYCODONE HYDROCHLORIDE 10 MG: 5 TABLET ORAL at 12:43

## 2018-01-03 RX ADMIN — SODIUM CHLORIDE: 9 INJECTION, SOLUTION INTRAVENOUS at 12:40

## 2018-01-03 RX ADMIN — EPHEDRINE SULFATE 10 MG: 50 INJECTION, SOLUTION INTRAMUSCULAR; INTRAVENOUS; SUBCUTANEOUS at 09:25

## 2018-01-03 RX ADMIN — EPHEDRINE SULFATE 10 MG: 50 INJECTION, SOLUTION INTRAMUSCULAR; INTRAVENOUS; SUBCUTANEOUS at 09:33

## 2018-01-03 RX ADMIN — TAMSULOSIN HYDROCHLORIDE 0.4 MG: 0.4 CAPSULE ORAL at 12:43

## 2018-01-03 RX ADMIN — PREGABALIN 75 MG: 75 CAPSULE ORAL at 07:06

## 2018-01-03 RX ADMIN — CLINDAMYCIN PHOSPHATE 900 MG: 900 INJECTION INTRAVENOUS at 12:43

## 2018-01-03 RX ADMIN — SODIUM CHLORIDE: 9 INJECTION, SOLUTION INTRAVENOUS at 18:30

## 2018-01-03 RX ADMIN — TRAMADOL HYDROCHLORIDE 50 MG: 50 TABLET, FILM COATED ORAL at 16:54

## 2018-01-03 RX ADMIN — PHENYLEPHRINE HYDROCHLORIDE 80 MCG: 10 INJECTION INTRAVENOUS at 09:33

## 2018-01-03 RX ADMIN — APIXABAN 2.5 MG: 2.5 TABLET, FILM COATED ORAL at 16:54

## 2018-01-03 RX ADMIN — CEFAZOLIN 2 G: 1 INJECTION, POWDER, FOR SOLUTION INTRAMUSCULAR; INTRAVENOUS at 16:53

## 2018-01-03 RX ADMIN — PHENYLEPHRINE HYDROCHLORIDE 160 MCG: 10 INJECTION INTRAVENOUS at 09:53

## 2018-01-03 RX ADMIN — ONDANSETRON HYDROCHLORIDE 4 MG: 2 SOLUTION INTRAMUSCULAR; INTRAVENOUS at 08:50

## 2018-01-03 RX ADMIN — PANTOPRAZOLE SODIUM 40 MG: 40 TABLET, DELAYED RELEASE ORAL at 21:19

## 2018-01-03 RX ADMIN — CLINDAMYCIN PHOSPHATE 900 MG: 900 INJECTION INTRAVENOUS at 21:18

## 2018-01-03 RX ADMIN — MIDAZOLAM 2 MG: 1 INJECTION INTRAMUSCULAR; INTRAVENOUS at 08:09

## 2018-01-03 RX ADMIN — LIDOCAINE HYDROCHLORIDE 3 ML: 10 INJECTION, SOLUTION EPIDURAL; INFILTRATION; INTRACAUDAL; PERINEURAL at 09:01

## 2018-01-03 RX ADMIN — ACETAMINOPHEN 1000 MG: 500 TABLET ORAL at 21:19

## 2018-01-03 RX ADMIN — TRAMADOL HYDROCHLORIDE 50 MG: 50 TABLET, FILM COATED ORAL at 12:43

## 2018-01-03 RX ADMIN — SODIUM CHLORIDE, SODIUM LACTATE, POTASSIUM CHLORIDE, AND CALCIUM CHLORIDE: 600; 310; 30; 20 INJECTION, SOLUTION INTRAVENOUS at 09:30

## 2018-01-03 RX ADMIN — OXYCODONE HYDROCHLORIDE 10 MG: 10 TABLET, FILM COATED, EXTENDED RELEASE ORAL at 07:07

## 2018-01-03 RX ADMIN — BUPIVACAINE HYDROCHLORIDE IN DEXTROSE 2 ML: 7.5 INJECTION, SOLUTION SUBARACHNOID at 09:07

## 2018-01-03 RX ADMIN — SODIUM CHLORIDE, SODIUM LACTATE, POTASSIUM CHLORIDE, AND CALCIUM CHLORIDE: 600; 310; 30; 20 INJECTION, SOLUTION INTRAVENOUS at 07:07

## 2018-01-03 RX ADMIN — PHENYLEPHRINE HYDROCHLORIDE 80 MCG: 10 INJECTION INTRAVENOUS at 09:23

## 2018-01-03 RX ADMIN — DEXAMETHASONE SODIUM PHOSPHATE 10 MG: 10 INJECTION INTRAMUSCULAR; INTRAVENOUS at 09:11

## 2018-01-03 RX ADMIN — PHENYLEPHRINE HYDROCHLORIDE 80 MCG: 10 INJECTION INTRAVENOUS at 09:19

## 2018-01-03 RX ADMIN — CEFAZOLIN 2 G: 1 INJECTION, POWDER, FOR SOLUTION INTRAMUSCULAR; INTRAVENOUS; PARENTERAL at 09:16

## 2018-01-03 RX ADMIN — OXYCODONE HYDROCHLORIDE 10 MG: 5 TABLET ORAL at 21:19

## 2018-01-03 RX ADMIN — PROPOFOL 75 MCG/KG/MIN: 10 INJECTION, EMULSION INTRAVENOUS at 09:15

## 2018-01-03 RX ADMIN — OXYCODONE HYDROCHLORIDE 10 MG: 10 TABLET, FILM COATED, EXTENDED RELEASE ORAL at 21:19

## 2018-01-03 ASSESSMENT — LIFESTYLE VARIABLES: SMOKING_STATUS: 0

## 2018-01-03 ASSESSMENT — PAIN SCALES - GENERAL
PAINLEVEL_OUTOF10: 3
PAINLEVEL_OUTOF10: 4
PAINLEVEL_OUTOF10: 2

## 2018-01-03 NOTE — ANESTHESIA PRE PROCEDURE
Department of Anesthesiology  Preprocedure Note       Name:  Ramiro Rider   Age:  81 y.o.  :  1945                                          MRN:  [id number]         Date:  1/3/2018      Surgeon: Ok Suero):  Rae Wu MD    Procedure: Procedure(s):  KNEE TOTAL ARTHROPLASTY  INJECTION MEDICATION    Medications prior to admission:   Prior to Admission medications    Medication Sig Start Date End Date Taking?  Authorizing Provider   valsartan (DIOVAN) 80 MG tablet Take 80 mg by mouth daily   Yes Historical Provider, MD   clopidogrel (PLAVIX) 75 MG tablet Take 75 mg by mouth daily   Yes Historical Provider, MD   pantoprazole (PROTONIX) 40 MG tablet Take 40 mg by mouth daily   Yes Historical Provider, MD   aspirin 81 MG tablet Take 81 mg by mouth daily   Yes Historical Provider, MD   Triamterene-HCTZ (DYAZIDE PO) Take 25 mg by mouth daily    Historical Provider, MD       Current medications:    Current Facility-Administered Medications   Medication Dose Route Frequency Provider Last Rate Last Dose    ceFAZolin (ANCEF) 2 g in sterile water 20 mL IV syringe  2 g Intravenous Once Rae Wu MD        dexamethasone (DECADRON) injection 10 mg  10 mg Intravenous Once Rae Wu MD        lactated ringers infusion   Intravenous Continuous Rae Wu  mL/hr at 18 0707         Allergies:  No Known Allergies    Problem List:    Patient Active Problem List   Diagnosis Code    Primary osteoarthritis of left knee M17.12       Past Medical History:        Diagnosis Date    Cerebral artery occlusion with cerebral infarction ([facility] [address] 2014    mini stroke - takes plavix    History of blood transfusion 2016    r/t bleeding ulcer    Hypertension     Kidney stones        Past Surgical History:        Procedure Laterality Date    APPENDECTOMY      LITHOTRIPSY      4 times       Social History:    Social History   Substance Use Topics    Smoking status: Former Smoker     Years: 15.00     Quit

## 2018-01-03 NOTE — BRIEF OP NOTE
Department of Orthopedic Surgery  Brief Operative Report      Surgeon: Chelsea Madrigal    Procedure: Left TKA R Knee Injection  Anesthesia:  Spinal Anesthesia and block    Estimated blood loss:  65 cc    Fluids:1600cc    TT: 46 minutes    UO: 150cc     Drians:  none      See dictated operative report for full details.     Electronically signed by Jaya Swenson MD on 1/3/18 at 10:47 AM

## 2018-01-04 LAB
ANION GAP SERPL CALCULATED.3IONS-SCNC: 13 MMOL/L (ref 7–19)
BUN BLDV-MCNC: 29 MG/DL (ref 8–23)
CALCIUM SERPL-MCNC: 7.9 MG/DL (ref 8.8–10.2)
CHLORIDE BLD-SCNC: 99 MMOL/L (ref 98–111)
CO2: 24 MMOL/L (ref 22–29)
CREAT SERPL-MCNC: 1.6 MG/DL (ref 0.5–1.2)
GFR NON-AFRICAN AMERICAN: 43
GLUCOSE BLD-MCNC: 156 MG/DL (ref 74–109)
HCT VFR BLD CALC: 35.2 % (ref 42–52)
HEMOGLOBIN: 11.4 G/DL (ref 14–18)
POTASSIUM SERPL-SCNC: 4.6 MMOL/L (ref 3.5–5)
SODIUM BLD-SCNC: 136 MMOL/L (ref 136–145)

## 2018-01-04 PROCEDURE — 1210000000 HC MED SURG R&B

## 2018-01-04 PROCEDURE — 85018 HEMOGLOBIN: CPT

## 2018-01-04 PROCEDURE — 80048 BASIC METABOLIC PNL TOTAL CA: CPT

## 2018-01-04 PROCEDURE — 97162 PT EVAL MOD COMPLEX 30 MIN: CPT

## 2018-01-04 PROCEDURE — 36415 COLL VENOUS BLD VENIPUNCTURE: CPT

## 2018-01-04 PROCEDURE — G8979 MOBILITY GOAL STATUS: HCPCS

## 2018-01-04 PROCEDURE — 2580000003 HC RX 258: Performed by: ORTHOPAEDIC SURGERY

## 2018-01-04 PROCEDURE — G8978 MOBILITY CURRENT STATUS: HCPCS

## 2018-01-04 PROCEDURE — 6360000002 HC RX W HCPCS: Performed by: ORTHOPAEDIC SURGERY

## 2018-01-04 PROCEDURE — 6370000000 HC RX 637 (ALT 250 FOR IP): Performed by: ORTHOPAEDIC SURGERY

## 2018-01-04 PROCEDURE — 97116 GAIT TRAINING THERAPY: CPT

## 2018-01-04 PROCEDURE — 2500000003 HC RX 250 WO HCPCS: Performed by: ORTHOPAEDIC SURGERY

## 2018-01-04 PROCEDURE — 85014 HEMATOCRIT: CPT

## 2018-01-04 PROCEDURE — P9045 ALBUMIN (HUMAN), 5%, 250 ML: HCPCS | Performed by: ORTHOPAEDIC SURGERY

## 2018-01-04 RX ORDER — ALBUMIN, HUMAN INJ 5% 5 %
25 SOLUTION INTRAVENOUS ONCE
Status: COMPLETED | OUTPATIENT
Start: 2018-01-04 | End: 2018-01-04

## 2018-01-04 RX ADMIN — TRIAMTERENE AND HYDROCHLOROTHIAZIDE 1 CAPSULE: 25; 37.5 CAPSULE ORAL at 08:14

## 2018-01-04 RX ADMIN — TAMSULOSIN HYDROCHLORIDE 0.4 MG: 0.4 CAPSULE ORAL at 08:15

## 2018-01-04 RX ADMIN — ALBUMIN (HUMAN) 25 G: 12.5 INJECTION, SOLUTION INTRAVENOUS at 08:18

## 2018-01-04 RX ADMIN — OXYCODONE HYDROCHLORIDE 20 MG: 5 TABLET ORAL at 18:31

## 2018-01-04 RX ADMIN — CLINDAMYCIN PHOSPHATE 900 MG: 900 INJECTION INTRAVENOUS at 12:49

## 2018-01-04 RX ADMIN — TRAMADOL HYDROCHLORIDE 50 MG: 50 TABLET, FILM COATED ORAL at 17:37

## 2018-01-04 RX ADMIN — OXYCODONE HYDROCHLORIDE 20 MG: 5 TABLET ORAL at 06:14

## 2018-01-04 RX ADMIN — PANTOPRAZOLE SODIUM 40 MG: 40 TABLET, DELAYED RELEASE ORAL at 08:16

## 2018-01-04 RX ADMIN — Medication 10 ML: at 21:02

## 2018-01-04 RX ADMIN — CEFAZOLIN 2 G: 1 INJECTION, POWDER, FOR SOLUTION INTRAMUSCULAR; INTRAVENOUS at 02:14

## 2018-01-04 RX ADMIN — ACETAMINOPHEN 1000 MG: 500 TABLET ORAL at 14:28

## 2018-01-04 RX ADMIN — ACETAMINOPHEN 1000 MG: 500 TABLET ORAL at 21:02

## 2018-01-04 RX ADMIN — OXYCODONE HYDROCHLORIDE 10 MG: 10 TABLET, FILM COATED, EXTENDED RELEASE ORAL at 08:15

## 2018-01-04 RX ADMIN — SODIUM CHLORIDE: 9 INJECTION, SOLUTION INTRAVENOUS at 06:13

## 2018-01-04 RX ADMIN — APIXABAN 2.5 MG: 2.5 TABLET, FILM COATED ORAL at 17:36

## 2018-01-04 RX ADMIN — OXYCODONE HYDROCHLORIDE 10 MG: 10 TABLET, FILM COATED, EXTENDED RELEASE ORAL at 21:02

## 2018-01-04 RX ADMIN — CLINDAMYCIN PHOSPHATE 900 MG: 900 INJECTION INTRAVENOUS at 06:18

## 2018-01-04 RX ADMIN — TRAMADOL HYDROCHLORIDE 50 MG: 50 TABLET, FILM COATED ORAL at 11:35

## 2018-01-04 RX ADMIN — CLINDAMYCIN PHOSPHATE 900 MG: 900 INJECTION INTRAVENOUS at 21:02

## 2018-01-04 RX ADMIN — APIXABAN 2.5 MG: 2.5 TABLET, FILM COATED ORAL at 06:14

## 2018-01-04 RX ADMIN — TRAMADOL HYDROCHLORIDE 50 MG: 50 TABLET, FILM COATED ORAL at 00:12

## 2018-01-04 RX ADMIN — OXYCODONE HYDROCHLORIDE 20 MG: 5 TABLET ORAL at 10:34

## 2018-01-04 RX ADMIN — ACETAMINOPHEN 1000 MG: 500 TABLET ORAL at 06:13

## 2018-01-04 RX ADMIN — TRAMADOL HYDROCHLORIDE 50 MG: 50 TABLET, FILM COATED ORAL at 06:13

## 2018-01-04 RX ADMIN — DOCUSATE SODIUM 100 MG: 100 CAPSULE, LIQUID FILLED ORAL at 21:15

## 2018-01-04 RX ADMIN — SODIUM CHLORIDE: 9 INJECTION, SOLUTION INTRAVENOUS at 00:12

## 2018-01-04 RX ADMIN — OXYCODONE HYDROCHLORIDE 20 MG: 5 TABLET ORAL at 14:28

## 2018-01-04 ASSESSMENT — PAIN SCALES - GENERAL
PAINLEVEL_OUTOF10: 2
PAINLEVEL_OUTOF10: 4
PAINLEVEL_OUTOF10: 2
PAINLEVEL_OUTOF10: 4
PAINLEVEL_OUTOF10: 6
PAINLEVEL_OUTOF10: 0
PAINLEVEL_OUTOF10: 7
PAINLEVEL_OUTOF10: 7

## 2018-01-04 NOTE — PLAN OF CARE
Problem: Falls - Risk of  Goal: Absence of falls  Outcome: Ongoing      Problem: Discharge Planning:  Goal: Discharged to appropriate level of care  Discharged to appropriate level of care  Outcome: Ongoing      Problem: Mobility - Impaired:  Goal: Mobility will improve  Mobility will improve  Outcome: Ongoing      Problem: Infection - Surgical Site:  Goal: Will show no infection signs and symptoms  Will show no infection signs and symptoms  Outcome: Ongoing      Problem: Pain - Acute:  Goal: Pain level will decrease  Pain level will decrease  Outcome: Ongoing

## 2018-01-05 LAB
ANION GAP SERPL CALCULATED.3IONS-SCNC: 13 MMOL/L (ref 7–19)
BUN BLDV-MCNC: 27 MG/DL (ref 8–23)
CALCIUM SERPL-MCNC: 8.6 MG/DL (ref 8.8–10.2)
CHLORIDE BLD-SCNC: 97 MMOL/L (ref 98–111)
CO2: 26 MMOL/L (ref 22–29)
CREAT SERPL-MCNC: 1.5 MG/DL (ref 0.5–1.2)
GFR NON-AFRICAN AMERICAN: 46
GLUCOSE BLD-MCNC: 141 MG/DL (ref 74–109)
HBA1C MFR BLD: 5.9 %
HCT VFR BLD CALC: 32.8 % (ref 42–52)
HEMOGLOBIN: 10.7 G/DL (ref 14–18)
MCH RBC QN AUTO: 31.9 PG (ref 27–31)
MCHC RBC AUTO-ENTMCNC: 32.6 G/DL (ref 33–37)
MCV RBC AUTO: 97.9 FL (ref 80–94)
PDW BLD-RTO: 12.2 % (ref 11.5–14.5)
PLATELET # BLD: 240 K/UL (ref 130–400)
PMV BLD AUTO: 10.6 FL (ref 9.4–12.4)
POTASSIUM SERPL-SCNC: 4.4 MMOL/L (ref 3.5–5)
RBC # BLD: 3.35 M/UL (ref 4.7–6.1)
SODIUM BLD-SCNC: 136 MMOL/L (ref 136–145)
WBC # BLD: 14.3 K/UL (ref 4.8–10.8)

## 2018-01-05 PROCEDURE — 6370000000 HC RX 637 (ALT 250 FOR IP): Performed by: ORTHOPAEDIC SURGERY

## 2018-01-05 PROCEDURE — 85027 COMPLETE CBC AUTOMATED: CPT

## 2018-01-05 PROCEDURE — 2580000003 HC RX 258: Performed by: ORTHOPAEDIC SURGERY

## 2018-01-05 PROCEDURE — 36415 COLL VENOUS BLD VENIPUNCTURE: CPT

## 2018-01-05 PROCEDURE — 6360000002 HC RX W HCPCS: Performed by: ORTHOPAEDIC SURGERY

## 2018-01-05 PROCEDURE — 83036 HEMOGLOBIN GLYCOSYLATED A1C: CPT

## 2018-01-05 PROCEDURE — 6360000002 HC RX W HCPCS: Performed by: PHYSICIAN ASSISTANT

## 2018-01-05 PROCEDURE — 97116 GAIT TRAINING THERAPY: CPT

## 2018-01-05 PROCEDURE — 2500000003 HC RX 250 WO HCPCS: Performed by: ORTHOPAEDIC SURGERY

## 2018-01-05 PROCEDURE — 1210000000 HC MED SURG R&B

## 2018-01-05 PROCEDURE — 80048 BASIC METABOLIC PNL TOTAL CA: CPT

## 2018-01-05 RX ORDER — ONDANSETRON 4 MG/1
4 TABLET, FILM COATED ORAL EVERY 8 HOURS PRN
Qty: 30 TABLET | Refills: 0 | Status: SHIPPED | OUTPATIENT
Start: 2018-01-05 | End: 2021-04-30

## 2018-01-05 RX ORDER — OXYCODONE HYDROCHLORIDE 5 MG/1
5 TABLET ORAL EVERY 4 HOURS PRN
Qty: 90 TABLET | Refills: 0
Start: 2018-01-05 | End: 2018-01-12

## 2018-01-05 RX ADMIN — Medication 10 ML: at 10:04

## 2018-01-05 RX ADMIN — ACETAMINOPHEN 1000 MG: 500 TABLET ORAL at 15:20

## 2018-01-05 RX ADMIN — TRIAMTERENE AND HYDROCHLOROTHIAZIDE 1 CAPSULE: 25; 37.5 CAPSULE ORAL at 08:30

## 2018-01-05 RX ADMIN — TRAMADOL HYDROCHLORIDE 50 MG: 50 TABLET, FILM COATED ORAL at 19:09

## 2018-01-05 RX ADMIN — OXYCODONE HYDROCHLORIDE 20 MG: 5 TABLET ORAL at 16:57

## 2018-01-05 RX ADMIN — ONDANSETRON 4 MG: 2 INJECTION INTRAMUSCULAR; INTRAVENOUS at 13:07

## 2018-01-05 RX ADMIN — ACETAMINOPHEN 1000 MG: 500 TABLET ORAL at 08:36

## 2018-01-05 RX ADMIN — APIXABAN 2.5 MG: 2.5 TABLET, FILM COATED ORAL at 19:09

## 2018-01-05 RX ADMIN — PANTOPRAZOLE SODIUM 40 MG: 40 TABLET, DELAYED RELEASE ORAL at 08:30

## 2018-01-05 RX ADMIN — APIXABAN 2.5 MG: 2.5 TABLET, FILM COATED ORAL at 04:40

## 2018-01-05 RX ADMIN — METHYLNALTREXONE BROMIDE 12 MG: 12 INJECTION, SOLUTION SUBCUTANEOUS at 12:41

## 2018-01-05 RX ADMIN — OXYCODONE HYDROCHLORIDE 20 MG: 5 TABLET ORAL at 00:31

## 2018-01-05 RX ADMIN — OXYCODONE HYDROCHLORIDE 20 MG: 5 TABLET ORAL at 12:46

## 2018-01-05 RX ADMIN — OXYCODONE HYDROCHLORIDE 10 MG: 10 TABLET, FILM COATED, EXTENDED RELEASE ORAL at 21:51

## 2018-01-05 RX ADMIN — OXYCODONE HYDROCHLORIDE 20 MG: 5 TABLET ORAL at 08:30

## 2018-01-05 RX ADMIN — TRAMADOL HYDROCHLORIDE 50 MG: 50 TABLET, FILM COATED ORAL at 04:39

## 2018-01-05 RX ADMIN — OXYCODONE HYDROCHLORIDE 20 MG: 5 TABLET ORAL at 21:51

## 2018-01-05 RX ADMIN — CLINDAMYCIN PHOSPHATE 900 MG: 900 INJECTION INTRAVENOUS at 04:39

## 2018-01-05 RX ADMIN — FENTANYL CITRATE 75 MCG: 50 INJECTION, SOLUTION INTRAMUSCULAR; INTRAVENOUS at 14:10

## 2018-01-05 RX ADMIN — TAMSULOSIN HYDROCHLORIDE 0.4 MG: 0.4 CAPSULE ORAL at 08:29

## 2018-01-05 RX ADMIN — TRAMADOL HYDROCHLORIDE 50 MG: 50 TABLET, FILM COATED ORAL at 12:41

## 2018-01-05 RX ADMIN — OXYCODONE HYDROCHLORIDE 10 MG: 10 TABLET, FILM COATED, EXTENDED RELEASE ORAL at 08:29

## 2018-01-05 RX ADMIN — DOCUSATE SODIUM 100 MG: 100 CAPSULE, LIQUID FILLED ORAL at 21:50

## 2018-01-05 RX ADMIN — DOCUSATE SODIUM 100 MG: 100 CAPSULE, LIQUID FILLED ORAL at 08:30

## 2018-01-05 RX ADMIN — ONDANSETRON 4 MG: 2 INJECTION INTRAMUSCULAR; INTRAVENOUS at 08:29

## 2018-01-05 ASSESSMENT — PAIN SCALES - GENERAL
PAINLEVEL_OUTOF10: 9
PAINLEVEL_OUTOF10: 8
PAINLEVEL_OUTOF10: 0
PAINLEVEL_OUTOF10: 6
PAINLEVEL_OUTOF10: 7
PAINLEVEL_OUTOF10: 0
PAINLEVEL_OUTOF10: 7
PAINLEVEL_OUTOF10: 5
PAINLEVEL_OUTOF10: 9
PAINLEVEL_OUTOF10: 7
PAINLEVEL_OUTOF10: 9
PAINLEVEL_OUTOF10: 6

## 2018-01-05 NOTE — CONSULTS
neg  Pulmonary: neg  GI: neg  : neg  Psych: neg  Neuro: neg  Skin: neg  MusculoSkeletal: neg  HEENT: neg  Joints: left knee pain  Vitals:  /71   Pulse 89   Temp 98.5 °F (36.9 °C) (Temporal)   Resp 18   Ht 5' 10\" (1.778 m)   Wt 210 lb (95.3 kg)   SpO2 98%   BMI 30.13 kg/m²     PHYSICAL EXAM:  Gen: NAD, alert, pleasant, in chair  HEENT: WNL  Lymph: no LAD  Neck: no JVD or masses  Chest: CTA bilat  CV: RRR  Abdomen: NT/ND  Extrem: no C/C/E  Neuro: non focal  Skin: no rashes  Joints: no redness  DATA:  I have reviewed the admission labs and imaging tests. ASSESSMENT AND PLAN:      Patient Active Hospital Problem List:   Primary osteoarthritis of left knee, S/P left TKA--follow with Ortho, continue PT, pain treatment and anticoagulation   CKD----monitor labs, pre op lab creat.  1.7   HTN---BP is stable, follow          Gigi Moulton MD  9:20 PM 1/4/2018

## 2018-01-05 NOTE — DISCHARGE SUMMARY
50 mcg, 50 mcg, Intravenous, Q3H PRN **OR** fentaNYL (SUBLIMAZE) injection 75 mcg, 75 mcg, Intravenous, Q3H PRN  fentaNYL (SUBLIMAZE) injection 75 mcg, 75 mcg, Intravenous, Q3H PRN **OR** fentaNYL (SUBLIMAZE) injection 100 mcg, 100 mcg, Intravenous, Q3H PRN    Post-operatively the patients diet was advanced as tolerated and their incision was checked on POD #1. The incision was clean, dry and intact with no signs of infection. The patient remained neurovascularly intact in the lower extremity and had intact pulses distally. Patients calf remained soft and showed no evidence of DVT. The patient was able to move his left leg and ankle/foot without any problems post-operatively. Physical therapy and occupational therapy were consulted and began working with the patient post-operatively. The patient progressed with PT/OT as would be expected and continued to improve through their stay. The patients pain was initially controlled with IV medications but we were able to transition to oral pain medications soon after arrival to the floor and their pain remained under good control through their hospital stay. From a medical standpoint the patient remained stable and continued to have the medicine team follow throughout their stay. Acute postoperative blood loss anemia after joint replacement being monitored with daily hemoglobin/hematocrit. The patients dressing was changed/incison was checked on day of d/c. The patient will be discharged at this time to  Home with [address] [place] [address]   with their current diet restrictions and will continue to follow the total knee precautions outlined to them by us and PT/OT. Condition on Discharge: Stable    Plan  Followup at scheduled appointment time (4-6 wks post-op). Patient was instructed on the use of pain medications, the signs and symptoms of infection, and was given our number to call should they have any questions or concerns following discharge.

## 2018-01-06 VITALS
WEIGHT: 210 LBS | TEMPERATURE: 97.6 F | DIASTOLIC BLOOD PRESSURE: 76 MMHG | BODY MASS INDEX: 30.06 KG/M2 | SYSTOLIC BLOOD PRESSURE: 131 MMHG | RESPIRATION RATE: 14 BRPM | OXYGEN SATURATION: 93 % | HEART RATE: 88 BPM | HEIGHT: 70 IN

## 2018-01-06 LAB
ANION GAP SERPL CALCULATED.3IONS-SCNC: 11 MMOL/L (ref 7–19)
BUN BLDV-MCNC: 28 MG/DL (ref 8–23)
CALCIUM SERPL-MCNC: 8.7 MG/DL (ref 8.8–10.2)
CHLORIDE BLD-SCNC: 95 MMOL/L (ref 98–111)
CO2: 30 MMOL/L (ref 22–29)
CREAT SERPL-MCNC: 1.6 MG/DL (ref 0.5–1.2)
GFR NON-AFRICAN AMERICAN: 43
GLUCOSE BLD-MCNC: 135 MG/DL (ref 74–109)
HCT VFR BLD CALC: 31.1 % (ref 42–52)
HEMOGLOBIN: 9.9 G/DL (ref 14–18)
POTASSIUM SERPL-SCNC: 4.4 MMOL/L (ref 3.5–5)
SODIUM BLD-SCNC: 136 MMOL/L (ref 136–145)

## 2018-01-06 PROCEDURE — 97116 GAIT TRAINING THERAPY: CPT

## 2018-01-06 PROCEDURE — 85014 HEMATOCRIT: CPT

## 2018-01-06 PROCEDURE — 85018 HEMOGLOBIN: CPT

## 2018-01-06 PROCEDURE — 36415 COLL VENOUS BLD VENIPUNCTURE: CPT

## 2018-01-06 PROCEDURE — 6370000000 HC RX 637 (ALT 250 FOR IP): Performed by: ORTHOPAEDIC SURGERY

## 2018-01-06 PROCEDURE — 80048 BASIC METABOLIC PNL TOTAL CA: CPT

## 2018-01-06 RX ADMIN — TRAMADOL HYDROCHLORIDE 50 MG: 50 TABLET, FILM COATED ORAL at 09:47

## 2018-01-06 RX ADMIN — OXYCODONE HYDROCHLORIDE 10 MG: 10 TABLET, FILM COATED, EXTENDED RELEASE ORAL at 09:47

## 2018-01-06 RX ADMIN — DOCUSATE SODIUM 100 MG: 100 CAPSULE, LIQUID FILLED ORAL at 09:48

## 2018-01-06 RX ADMIN — TRIAMTERENE AND HYDROCHLOROTHIAZIDE 1 CAPSULE: 25; 37.5 CAPSULE ORAL at 09:52

## 2018-01-06 RX ADMIN — TAMSULOSIN HYDROCHLORIDE 0.4 MG: 0.4 CAPSULE ORAL at 09:48

## 2018-01-06 RX ADMIN — OXYCODONE HYDROCHLORIDE 20 MG: 5 TABLET ORAL at 09:47

## 2018-01-06 RX ADMIN — APIXABAN 2.5 MG: 2.5 TABLET, FILM COATED ORAL at 09:52

## 2018-01-06 RX ADMIN — TRAMADOL HYDROCHLORIDE 50 MG: 50 TABLET, FILM COATED ORAL at 03:49

## 2018-01-06 RX ADMIN — PANTOPRAZOLE SODIUM 40 MG: 40 TABLET, DELAYED RELEASE ORAL at 09:52

## 2018-01-06 ASSESSMENT — PAIN SCALES - GENERAL
PAINLEVEL_OUTOF10: 0
PAINLEVEL_OUTOF10: 3

## 2018-01-06 NOTE — PROGRESS NOTES
Patient has refused flu vaccination.  Electronically signed by Flavia Smallwood RN on 1/4/2018 at 3:15 PM
Patient visited by [facility] volunteer Missael Jones.
Physical Therapy    Pt declined mobility due to decreased sensation.  Will attempt in am.    Electronically signed by Lorne Loyola PT on 1/3/2018 at 4:00 PM
Physical Therapy  Carol Ann Baltazar  [id number]     01/05/18 1456   Subjective   Subjective Patient agrees to work with therapy   Bed Mobility   Supine to Sit Stand by assistance   Sit to Supine Stand by assistance   Transfers   Sit to Stand Stand by assistance   Stand to sit Stand by assistance   Ambulation   Ambulation? Yes   Ambulation 1   Surface level tile   Device Rolling Walker   Assistance Contact guard assistance;Stand by assistance   Quality of Gait slow, steady, no LOB   Distance 48'   Other Activities   Comment Patient did well with therapy. Patient requested to return to bed after treatment. Patient positioned for comfort with all needs in reach and fresh active ice applied. Short term goals   Time Frame for Short term goals 14 DAYS BEFORE RE EVAL   Short term goal 1 SIT<>STAND SUPERVISION   Short term goal 2  FT WITH RW AND SBA   Short term goal 3 STEPS WITH CGA   Activity Tolerance   Activity Tolerance Patient Tolerated treatment well   Safety Devices   Type of devices Call light within reach; Left in bed   Electronically signed by Lanna Lennox, PTA on 1/5/2018 at 2:57 PM
Progress Note  Caden Lucas  1/6/2018 10:19 AM  Subjective:   Admit Date:   1/3/2018      CC/ADMIT DX:       Interval History:   Reviewed overnight events and nursing notes. His nausea is resolved. His pain is stable. No CP or SOA. I have reviewed all labs/diagnostics from the last 24hrs. ROS:   I have done a 10 point ROS and all are negative, except what is mentioned in the HPI. DIET GENERAL;  Dietary Nutrition Supplements: Standard High Calorie Oral Supplement    Medications:      sodium chloride 175 mL/hr at 01/04/18 4383    lactated ringers Stopped (01/03/18 1251)    lactated ringers Stopped (01/03/18 1233)      pantoprazole  40 mg Oral Daily    triamterene-hydrochlorothiazide  1 capsule Oral Daily    sodium chloride flush  10 mL Intravenous 2 times per day    acetaminophen  1,000 mg Oral Q8H    docusate sodium  100 mg Oral BID    oxyCODONE  10 mg Oral 2 times per day    traMADol  50 mg Oral Q6H    tamsulosin  0.4 mg Oral Daily    apixaban  2.5 mg Oral Q12H    influenza virus vaccine  0.5 mL Intramuscular Once           Objective:   Vitals: /76   Pulse 88   Temp 97.6 °F (36.4 °C) (Temporal)   Resp 14   Ht 5' 10\" (1.778 m)   Wt 210 lb (95.3 kg)   SpO2 93%   BMI 30.13 kg/m²      Intake/Output Summary (Last 24 hours) at 01/06/18 1019  Last data filed at 01/06/18 0443   Gross per 24 hour   Intake             1030 ml   Output                0 ml   Net             1030 ml     General appearance: alert and cooperative with exam  Lungs: clear to auscultation bilaterally  Heart: RRR  Abdomen: soft, non-tender; bowel sounds normal; no masses,  no organomegaly  Extremities: extremities normal, atraumatic, no cyanosis or edema  Neurologic:  No obvious focal neurologic deficits.     Assessment and Plan:   Principal Problem:    Primary osteoarthritis of left knee  Active Problems:    Arthritis of knee    CKD    ABL Anemia    HTN    Plan:    Continue current medicine  Follow with
Subjective:     Post-Operative Day: 1 Status Post left tka  Systemic or Specific Complaints:No Complaints  no nausea Pain 2    Objective:     Patient Vitals for the past 24 hrs:   BP Temp Temp src Pulse Resp SpO2   01/04/18 0645 121/66 98.1 °F (36.7 °C) Temporal 90 18 90 %   01/04/18 0615 130/70 - - - - -   01/04/18 0346 (!) 98/56 97.5 °F (36.4 °C) Temporal 82 16 94 %   01/04/18 0012 (!) 93/58 97.4 °F (36.3 °C) Temporal 97 18 94 %   01/03/18 1911 101/63 98 °F (36.7 °C) Temporal 117 18 93 %   01/03/18 1735 108/64 98.4 °F (36.9 °C) Temporal 115 16 91 %   01/03/18 1519 112/65 98.6 °F (37 °C) Temporal 91 14 94 %   01/03/18 1412 114/64 98.4 °F (36.9 °C) Temporal 98 16 92 %   01/03/18 1308 115/66 98.4 °F (36.9 °C) Temporal 90 16 95 %   01/03/18 1237 124/79 - - 79 16 95 %   01/03/18 1204 118/74 97.8 °F (36.6 °C) Temporal 80 16 95 %   01/03/18 1148 - - - 77 - -   01/03/18 1140 108/70 - - 76 14 92 %   01/03/18 1135 111/65 - - 75 14 94 %   01/03/18 1130 97/63 - - 75 15 94 %   01/03/18 1125 103/65 - - 76 17 94 %   01/03/18 1120 114/67 - - 79 13 93 %   01/03/18 1115 - - - 79 17 92 %   01/03/18 1113 105/61 98.1 °F (36.7 °C) Temporal 80 11 -       General: alert, appears stated age and cooperative   Exam: Incision clean, dry, and intact, no evidence of infection. Neurovascular: Exam normal       Data Review:  Recent Labs      01/04/18   0250   HGB  11.4*     Recent Labs      01/04/18   0250   NA  136   K  4.6   CREATININE  1.6*     Recent Labs      01/04/18   0250   LABGLOM  43*           Assessment:     Status Post left tka. Doing well postoperatively. Plan:     Continues current post-op course  Albumin bolus.       Electronically signed by Caprice Pink MD on 1/4/2018 at 7:34 AM
Subjective:     Post-Operative Day: 3 Status Post left tka  Systemic or Specific Complaints:No Complaints  no nausea Pain 2    Objective:     Patient Vitals for the past 24 hrs:   BP Temp Temp src Pulse Resp SpO2   01/06/18 0733 131/76 97.6 °F (36.4 °C) - 88 14 93 %   01/06/18 0443 108/60 98.4 °F (36.9 °C) Temporal 90 16 92 %   01/05/18 2306 112/62 98.6 °F (37 °C) Temporal 89 17 93 %   01/05/18 1929 118/68 98.4 °F (36.9 °C) Temporal 86 16 95 %   01/05/18 1502 120/74 98.2 °F (36.8 °C) Temporal 86 16 90 %   01/05/18 1025 135/76 97.5 °F (36.4 °C) Temporal 100 16 92 %       General: alert, appears stated age and cooperative   Exam: Incision clean, dry, and intact, no evidence of infection. Neurovascular: Exam normal       Data Review:  Recent Labs      01/05/18   0159  01/06/18   0200   HGB  10.7*  9.9*     Recent Labs      01/06/18   0200   NA  136   K  4.4   CREATININE  1.6*     Recent Labs      01/06/18   0200   LABGLOM  43*           Assessment:     Status Post left tka. Doing well postoperatively. Plan:     Continues current post-op course  DC today.       Electronically signed by Mike Winn MD on 1/6/2018 at 9:15 AM
less than 20 percent impaired, limited or restricted  OutComes Score                                           AM-PAC Score             Goals  Short term goals  Time Frame for Short term goals: 14 DAYS BEFORE RE EVAL  Short term goal 1: SIT<>STAND SUPERVISION  Short term goal 2:  FT WITH RW AND SBA  Short term goal 3: STEPS WITH CGA  Patient Goals   Patient goals : D/C HOME WITH WIFE       Therapy Time   Individual Concurrent Group Co-treatment   Time In           Time Out           Minutes                   Denis Hernandez PT    Electronically signed by Denis Hernandez PT on 1/4/2018 at 11:13 AM

## 2021-04-30 ENCOUNTER — OFFICE VISIT (OUTPATIENT)
Dept: NEUROLOGY | Age: 76
End: 2021-04-30
Payer: MEDICARE

## 2021-04-30 VITALS
BODY MASS INDEX: 30.06 KG/M2 | DIASTOLIC BLOOD PRESSURE: 87 MMHG | HEIGHT: 70 IN | HEART RATE: 76 BPM | SYSTOLIC BLOOD PRESSURE: 140 MMHG | WEIGHT: 210 LBS

## 2021-04-30 DIAGNOSIS — R42 VERTIGO: ICD-10-CM

## 2021-04-30 DIAGNOSIS — Z86.73 HISTORY OF TRANSIENT ISCHEMIC ATTACK (TIA): Primary | ICD-10-CM

## 2021-04-30 DIAGNOSIS — Z86.79 HISTORY OF HYPERTENSION: ICD-10-CM

## 2021-04-30 PROCEDURE — G8427 DOCREV CUR MEDS BY ELIG CLIN: HCPCS | Performed by: PSYCHIATRY & NEUROLOGY

## 2021-04-30 PROCEDURE — 1036F TOBACCO NON-USER: CPT | Performed by: PSYCHIATRY & NEUROLOGY

## 2021-04-30 PROCEDURE — 4040F PNEUMOC VAC/ADMIN/RCVD: CPT | Performed by: PSYCHIATRY & NEUROLOGY

## 2021-04-30 PROCEDURE — 1123F ACP DISCUSS/DSCN MKR DOCD: CPT | Performed by: PSYCHIATRY & NEUROLOGY

## 2021-04-30 PROCEDURE — 3017F COLORECTAL CA SCREEN DOC REV: CPT | Performed by: PSYCHIATRY & NEUROLOGY

## 2021-04-30 PROCEDURE — 99203 OFFICE O/P NEW LOW 30 MIN: CPT | Performed by: PSYCHIATRY & NEUROLOGY

## 2021-04-30 PROCEDURE — G8417 CALC BMI ABV UP PARAM F/U: HCPCS | Performed by: PSYCHIATRY & NEUROLOGY

## 2021-04-30 RX ORDER — CLOPIDOGREL BISULFATE 75 MG/1
75 TABLET ORAL DAILY
COMMUNITY

## 2021-04-30 RX ORDER — ASPIRIN 81 MG/1
81 TABLET ORAL DAILY
COMMUNITY

## 2021-04-30 NOTE — PROGRESS NOTES
Chief Complaint   Patient presents with    New Patient     Referred by Erika Jama for TIA        Merlene Dsouza is a 78y.o. year old male who is seen for evaluation of his antiplatelet agents. He has been taking an aspirin since 2010. In 2014 Plavix was added. At that time he reportedly had a normal MRI of the head. He had been up on a ladder looking up and repairing something on the ceiling when he had brief vertigo. Since that time he has had this briefly when he turns over in bed the wrong way. Denies any other focal neurological difficulties. No history of heart disease. He is treated for hypertension but otherwise has no risk factors for stroke other than his age. He is wanting to come off of Plavix.       Active Ambulatory Problems     Diagnosis Date Noted    Primary osteoarthritis of left knee 01/01/2018    Arthritis of knee 01/03/2018     Resolved Ambulatory Problems     Diagnosis Date Noted    No Resolved Ambulatory Problems     Past Medical History:   Diagnosis Date    Cerebral artery occlusion with cerebral infarction ([facility] [address] 2014    History of blood transfusion 2016    Hypertension     Kidney stones        Past Surgical History:   Procedure Laterality Date    APPENDECTOMY      LITHOTRIPSY      4 times    AR APPL ON-BODY INJECTOR FOR TIMED SUBQ INJECTION Right 1/3/2018    INJECTION MEDICATION performed by Michel Partida MD at [address] ARTHROPLASTY Left 1/3/2018    KNEE TOTAL ARTHROPLASTY performed by Michel Partida MD at [facility] OR       Family History   Problem Relation Age of Onset    Heart Attack Father        No Known Allergies    Social History     Socioeconomic History    Marital status:      Spouse name: Not on file    Number of children: Not on file    Years of education: Not on file    Highest education level: Not on file   Occupational History    Not on file   Social Needs    Financial resource strain: Not on file    Food insecurity     Worry: Not on file     Inability: Not on file    Transportation needs     Medical: Not on file     Non-medical: Not on file   Tobacco Use    Smoking status: Former Smoker     Years: 15.00     Quit date: 1969     Years since quittin.3    Smokeless tobacco: Never Used   Substance and Sexual Activity    Alcohol use: No    Drug use: No    Sexual activity: Not on file   Lifestyle    Physical activity     Days per week: Not on file     Minutes per session: Not on file    Stress: Not on file   Relationships    Social connections     Talks on phone: Not on file     Gets together: Not on file     Attends Christian service: Not on file     Active member of club or organization: Not on file     Attends meetings of clubs or organizations: Not on file     Relationship status: Not on file    Intimate partner violence     Fear of current or ex partner: Not on file     Emotionally abused: Not on file     Physically abused: Not on file     Forced sexual activity: Not on file   Other Topics Concern    Not on file   Social History Narrative    Not on file       Review of Systems    Constitutional  No fever or chills. No diaphoresis or significant fatigue. HENT   No tinnitus or significant hearing loss. Eyes  no sudden vision change or eye pain  Respiratory  no significant shortness of breath or cough  Cardiovascular  no chest pain No palpitations or significant leg swelling  Gastrointestinal  no abdominal swelling or pain. Genitourinary  No difficulty urinating, dysuria  Musculoskeletal  no back pain or myalgia. Skin  no color change or rash  Neurologic  No seizures. No lateralizing weakness. Hematologic  yes easy bruising or excessive bleeding. Psychiatric  no severe anxiety or nervousness.    All other review of systems are negative.          Current Outpatient Medications   Medication Sig Dispense Refill    clopidogrel (PLAVIX) 75 MG tablet Take 75 mg by mouth daily      aspirin 81 MG EC tablet Take 81 mg by mouth daily      Triamterene-HCTZ (DYAZIDE PO) Take 37.5 mg by mouth every other day        No current facility-administered medications for this visit. Outpatient Medications Marked as Taking for the 4/30/21 encounter (Office Visit) with Tyler Main MD   Medication Sig Dispense Refill    clopidogrel (PLAVIX) 75 MG tablet Take 75 mg by mouth daily      aspirin 81 MG EC tablet Take 81 mg by mouth daily      Triamterene-HCTZ (DYAZIDE PO) Take 37.5 mg by mouth every other day          BP (!) 140/87   Pulse 76   Ht 5' 10\" (1.778 m)   Wt 210 lb (95.3 kg)   BMI 30.13 kg/m²       Constitutional  well developed, well nourished. Eyes  conjunctiva normal.  Pupils react to light  Ear, nose, throat -hearing intact to finger rub No scars, masses, or lesions over external nose or ears, no atrophy of tongue  Neck-symmetric, no masses noted, no jugular vein distension. No bruits noted. Respiration- chest wall appears symmetric, good expansion,   normal effort without use of accessory muscles  Cardiovascular- RRR  Musculoskeletal  no significant wasting of muscles noted, no bony deformities, gait no gross ataxia  Extremities-no clubbing, cyanosis or edema  Skin  warm, dry, and intact. No rash, erythema, or pallor. Psychiatric  mood, affect, and behavior appear normal.      Neurological exam  Awake, alert, fluent oriented x 3 appropriate affect  Attention and concentration appear appropriate  Recent and remote memory appears unremarkable  Speech normal without dysarthria  No clear issues with language of fund of knowledge    Cranial Nerve Exam   CN II- Visual fields grossly unremarkable. VA adequate.  Discs sharp  CN III, IV,VI- PERRLA, EOMI, No nystagmus, conjugate eye movements, no ptosis  CN V-sensation intact to LT over face  CN VII-no facial asymmetry  CN VIII-Hearing intact   CN IX and X- Palate elevates in midline  CN XI-good shoulder shrug  CN XII-Tongue midline with no fasciculations or fibrillations    Motor Exam  V/V throughout upper and lower extremities bilaterally, no cogwheeling, normal tone    Sensory Exam  Sensation intact to light touch , PP  upper and lower extremities bilaterally; normal vibration sense in LE's    Reflexes   2+ biceps bilaterally  2+ brachioradialis  2+ triceps  2+patella  2+ ankle jerks  No clonus ankles  No Dial's sign bilateral hands. No Babinski sign. Tremors- no tremors in hands or head noted    Gait  Normal base and speed  No ataxia. No Romberg sign    Coordination  Finger to nose and ELVIRA-unremarkable    No results found for: [id number]  Lab Results   Component Value Date    WBC 14.3 (H) 01/05/2018    HGB 9.9 (L) 01/06/2018    HCT 31.1 (L) 01/06/2018    MCV 97.9 (H) 01/05/2018     01/05/2018     Lab Results   Component Value Date     01/06/2018    K 4.4 01/06/2018    CL 95 (L) 01/06/2018    CO2 30 (H) 01/06/2018    BUN 28 (H) 01/06/2018    CREATININE 1.6 (H) 01/06/2018    GLUCOSE 135 (H) 01/06/2018    CALCIUM 8.7 (L) 01/06/2018    PROT 7.6 12/21/2017    LABALBU 4.4 12/21/2017    BILITOT 0.3 12/21/2017    ALKPHOS 88 12/21/2017    AST 21 12/21/2017    ALT 29 12/21/2017    LABGLOM 43 (A) 01/06/2018           Assessment    ICD-10-CM    1. History of transient ischemic attack (TIA)  Z86.73    2. Vertigo  R42    3. History of hypertension  Z86.79      The patient's episode back in 2014 sounds more like positional vertigo. He says he has been off of Plavix for several months at a time since then. I see no reason to continue the Plavix at this time but I have advised him to continue on aspirin. Of course, no guarantees can be made that he would not suffer in any way by coming off of the Plavix. The patient understands. I remain available as needed. Plan    Return if symptoms worsen or fail to improve.     (Please note that portions of this note were completed with a voice recognition program. Efforts were made to edit the dictations but occasionally words are mis-transcribed.)

## 2021-04-30 NOTE — PROGRESS NOTES
Review of Systems    Constitutional  No fever or chills. No diaphoresis or significant fatigue. HENT   No tinnitus or significant hearing loss. Eyes  no sudden vision change or eye pain  Respiratory  no significant shortness of breath or cough  Cardiovascular  no chest pain No palpitations or significant leg swelling  Gastrointestinal  no abdominal swelling or pain. Genitourinary  No difficulty urinating, dysuria  Musculoskeletal  no back pain or myalgia. Skin  no color change or rash  Neurologic  No seizures. No lateralizing weakness. Hematologic  yes easy bruising or excessive bleeding. Psychiatric  no severe anxiety or nervousness. All other review of systems are negative.

## 2022-06-20 ENCOUNTER — TELEPHONE (OUTPATIENT)
Dept: CARDIOLOGY | Facility: CLINIC | Age: 77
End: 2022-06-20

## 2022-06-20 NOTE — TELEPHONE ENCOUNTER
Caller: STEPHEN/ [facility]    Relationship: Provider    Best call back number: [phone]  What is the best time to reach you: DURING OFFICE HOURS    Who are you requesting to speak with (clinical staff, provider,  specific staff member):    Do you know the name of the person who called: STEPHEN    What was the call regarding: NARINDER WILLIS REFERRAL.  HAS THIS BEEN RECEIVED BY [facility] FOR SCHEDULING.  FAXED OVER ON 6.17.22    Do you require a callback: YES

## 2022-06-24 ENCOUNTER — OFFICE VISIT (OUTPATIENT)
Dept: CARDIOLOGY | Facility: CLINIC | Age: 77
End: 2022-06-24

## 2022-06-24 VITALS
DIASTOLIC BLOOD PRESSURE: 70 MMHG | HEART RATE: 84 BPM | WEIGHT: 196 LBS | BODY MASS INDEX: 28.12 KG/M2 | SYSTOLIC BLOOD PRESSURE: 130 MMHG

## 2022-06-24 DIAGNOSIS — G54.0 THORACIC OUTLET SYNDROME: ICD-10-CM

## 2022-06-24 DIAGNOSIS — R06.02 SHORTNESS OF BREATH: Primary | ICD-10-CM

## 2022-06-24 DIAGNOSIS — Z82.49 FAMILY HISTORY OF EARLY CAD: ICD-10-CM

## 2022-06-24 DIAGNOSIS — I10 PRIMARY HYPERTENSION: ICD-10-CM

## 2022-06-24 DIAGNOSIS — I45.9 SKIPPED HEART BEATS: ICD-10-CM

## 2022-06-24 DIAGNOSIS — R07.9 CHEST PAIN IN ADULT: ICD-10-CM

## 2022-06-24 DIAGNOSIS — I49.3 PVC (PREMATURE VENTRICULAR CONTRACTION): ICD-10-CM

## 2022-06-24 DIAGNOSIS — G45.4 TRANSIENT GLOBAL AMNESIA: ICD-10-CM

## 2022-06-24 PROCEDURE — 93000 ELECTROCARDIOGRAM COMPLETE: CPT | Performed by: INTERNAL MEDICINE

## 2022-06-24 PROCEDURE — 99204 OFFICE O/P NEW MOD 45 MIN: CPT | Performed by: INTERNAL MEDICINE

## 2022-06-24 RX ORDER — NITROGLYCERIN 0.4 MG/1
TABLET SUBLINGUAL
Qty: 25 TABLET | Refills: 3 | Status: SHIPPED | OUTPATIENT
Start: 2022-06-24 | End: 2023-01-08 | Stop reason: HOSPADM

## 2022-06-24 RX ORDER — ISOSORBIDE MONONITRATE 30 MG/1
30 TABLET, EXTENDED RELEASE ORAL DAILY
Qty: 30 TABLET | Refills: 11 | Status: SHIPPED | OUTPATIENT
Start: 2022-06-24 | End: 2023-01-08 | Stop reason: HOSPADM

## 2022-06-24 RX ORDER — AMLODIPINE BESYLATE 5 MG/1
5 TABLET ORAL DAILY
Qty: 30 TABLET | Refills: 11 | Status: SHIPPED | OUTPATIENT
Start: 2022-06-24 | End: 2022-08-24

## 2022-06-24 NOTE — PROGRESS NOTES
Gallo Philip  [id number]  1945  77 y.o.  male    Referring Provider: Carol Mccallum APRN    Reason for  Visit:  Initial visit for chest pain and shortness of breath     Subjective     Chest pain both with exertion as well as at rest.  Feels episodes of chest pain occur more often with exertion  Moderate substernal,   Pressure like   Chest pain non pleuritic  Chest pain non positional and non gustatory   Lasts less than 5 minutes    Started more than 2 months ago  Occurs once or twice a week on the average but can be variable in frequency  No associated diaphoresis    No associated nausea  No radiation    Relieved with rest    Not positional    No change with intake of food or antacids  No change with breathing  Mild to moderate associated dyspnea    No similar chest pain episodes in the past     Joint pain in small, medium and large joints   Chronic low back pain      BP well controlled at home.     No bleeding, excessive bruising, gait instability or fall risks      In past felt skipped beats   No associated symptoms of dizziness, weakness, chest pain,  shortness of breath       History of present illness:  Gallo Philip is a 77 y.o. yo male with essential hypertension   who presents today for   Chief Complaint   Patient presents with   • Chest Pain     Re-Establish Care   .    History  Past Medical History:   Diagnosis Date   • Hypertension    • Shortness of breath    • Stroke (HCC)     2013 - ON PLAVIX   ,   Past Surgical History:   Procedure Laterality Date   • APPENDECTOMY     • KIDNEY STONE SURGERY     • REPLACEMENT TOTAL KNEE Left 2018   ,   Family History   Problem Relation Age of Onset   • No Known Problems Mother    • Heart attack Father    • Heart disease Father    ,   Social History     Tobacco Use   • Smoking status: Former Smoker     Years: 10.00   Substance Use Topics   • Alcohol use: No   • Drug use: No   ,     Medications  Current Outpatient Medications   Medication Sig Dispense Refill   •  aspirin 81 MG tablet Take 81 mg by mouth Daily.     • clopidogrel (PLAVIX) 75 MG tablet Take 75 mg by mouth Daily.     • Triamterene-HCTZ (DYAZIDE PO) Take  by mouth.     • amLODIPine (NORVASC) 5 MG tablet Take 1 tablet by mouth Daily. 30 tablet 11   • isosorbide mononitrate (IMDUR) 30 MG 24 hr tablet Take 1 tablet by mouth Daily. 30 tablet 11   • nitroglycerin (NITROSTAT) 0.4 MG SL tablet 1 under the tongue as needed for angina, may repeat q5mins for up three doses 25 tablet 3   • pantoprazole (PROTONIX) 40 MG EC tablet Take 40 mg by mouth Daily.     • valsartan (DIOVAN) 80 MG tablet Take 80 mg by mouth Daily.       No current facility-administered medications for this visit.       Allergies:  Patient has no known allergies.    Review of Systems  Review of Systems   Constitutional: Negative.   HENT: Negative.    Eyes: Negative.    Cardiovascular: Positive for chest pain and dyspnea on exertion. Negative for claudication, cyanosis, irregular heartbeat, leg swelling, near-syncope, orthopnea, palpitations, paroxysmal nocturnal dyspnea and syncope.   Respiratory: Negative.    Endocrine: Negative.    Hematologic/Lymphatic: Negative.    Skin: Negative.    Musculoskeletal: Positive for arthritis, back pain and joint pain.   Gastrointestinal: Negative for anorexia.   Genitourinary: Negative.    Neurological: Negative.    Psychiatric/Behavioral: Negative.        Objective     Physical Exam:  /70   Pulse 84   Wt 88.9 kg (196 lb)   BMI 28.12 kg/m²     Physical Exam  Constitutional:       Appearance: He is well-developed.   HENT:      Head: Normocephalic.   Neck:      Vascular: Normal carotid pulses. No carotid bruit or JVD.      Trachea: No tracheal tenderness or tracheal deviation.   Cardiovascular:      Rate and Rhythm: Regular rhythm.      Pulses: Normal pulses.      Heart sounds: Normal heart sounds.   Pulmonary:      Effort: Pulmonary effort is normal.      Breath sounds: No stridor.   Abdominal:      General:  There is no distension.      Palpations: Abdomen is soft.      Tenderness: There is no abdominal tenderness.   Musculoskeletal:      Cervical back: No edema.   Skin:     General: Skin is warm.   Neurological:      Mental Status: He is alert.      Cranial Nerves: No cranial nerve deficit.      Sensory: No sensory deficit.   Psychiatric:         Speech: Speech normal.         Behavior: Behavior normal.         Results Review:  Gallo Philip  Echo Complete w/ Doppler, Color Flow and Contrast  Order# [id number]  Reading physician: Hero Cash MD Ordering physician: Hero Cash MD Study date: 17       Patient Information    Patient Name   Gallo Philip MRN   [id number] Legal Sex   Male  (Age)   1945 (77 y.o.)      CV PACS 2     Show images for Adult Transthoracic Echo Complete With Contrast   [facility] PACS 1     Show images for Adult Transthoracic Echo Complete With Contrast     Sedation Narrator Report    Sedation Narrator Report     Interpretation Summary    · Left ventricular function is normal. Estimated EF = 60%.  · Left ventricular diastolic dysfunction (grade I) consistent with impaired relaxation.  · No evidence of pulmonary hypertension is present             Results for orders placed during the hospital encounter of 17    Adult Stress Echo Only    Interpretation Summary  · Normal stress echo with no significant echocardiographic evidence for myocardial ischemia.  · Left ventricular function is normal. Estimated EF = 55%.        ____________________________________________________________________________________________________________________________________________  Health maintenance and recommendations    Low salt/ HTN/ Heart healthy carbohydrate restricted cardiac diet   The patient is advised to reduce or avoid caffeine or other cardiac stimulants.   Minimize or avoid  NSAID-type medications      Monitor for any signs of bleeding including red or dark stools. Fall precautions.    Advised staying uptodate with immunizations per established standard guidelines.    Offered to give patient  a copy of my notes     Questions were encouraged, asked and answered to the patient's  understanding and satisfaction. Questions if any regarding current medications and side effects, need for refills and importance of compliance to medications stressed.    Reviewed available prior notes, consults, prior visits, laboratory findings, radiology and cardiology relevant reports. Updated chart as applicable. I have reviewed the patient's medical history in detail and updated the computerized patient record as relevant.      Updated patient regarding any new or relevant abnormalities on review of records or any new findings on physical exam. Mentioned to patient about purpose of visit and desirable health short and long term goals and objectives.    Primary to monitor CBC CMP Lipid panel and TSH as applicable    ___________________________________________________________________________________________________________________________________________     ECG 12 Lead    Date/Time: 6/24/2022 10:55 AM  Performed by: Hero Cash MD  Authorized by: Hero Cash MD   Comparison: compared with previous ECG from 4/12/2017  Comparison to previous ECG: left bundle branch block   Rhythm: sinus rhythm  Ectopy: unifocal PVCs  Rate: normal  Conduction: left bundle branch block  QRS axis: normal    Clinical impression: abnormal EKG            Assessment & Plan   Diagnoses and all orders for this visit:    1. Shortness of breath (Primary)  -     Adult Transthoracic Echo Complete w/ Color, Spectral and Contrast if necessary per protocol; Future    2. Chest pain in adult  -     Stress Test With Myocardial Perfusion (1 Day); Future    3. Family history of early CAD    4. Primary hypertension    5. Thoracic outlet syndrome    6. PVC (premature ventricular contraction)    7. Transient global amnesia    8. Skipped heart beats  -      Holter Monitor - 72 Hour Up To 15 Days; Future    Other orders  -     ECG 12 Lead  -     amLODIPine (NORVASC) 5 MG tablet; Take 1 tablet by mouth Daily.  Dispense: 30 tablet; Refill: 11  -     nitroglycerin (NITROSTAT) 0.4 MG SL tablet; 1 under the tongue as needed for angina, may repeat q5mins for up three doses  Dispense: 25 tablet; Refill: 3  -     isosorbide mononitrate (IMDUR) 30 MG 24 hr tablet; Take 1 tablet by mouth Daily.  Dispense: 30 tablet; Refill: 11          Plan    Orders Placed This Encounter   Procedures   • Stress Test With Myocardial Perfusion (1 Day)     ASAP     Standing Status:   Future     Standing Expiration Date:   6/24/2023     Order Specific Question:   What stress agent will be used?     Answer:   Regadenoson (Lexiscan)     Order Specific Question:   Difficulty walking criteria?     Answer:   Musculoskeletal (hips, knees, feet, back, amputee)     Order Specific Question:   Reason for exam?     Answer:   Chest Pain     Order Specific Question:   Release to patient     Answer:   Immediate   • Holter Monitor - 72 Hour Up To 15 Days     Standing Status:   Future     Standing Expiration Date:   6/24/2023     Order Specific Question:   Reason for exam?     Answer:   Palpitations     Order Specific Question:   Release to patient     Answer:   Immediate     Order Specific Question:   How many days is the patient to wear the monitor?     Answer:   14   • ECG 12 Lead     This order was created via procedure documentation     Order Specific Question:   Release to patient     Answer:   Immediate   • Adult Transthoracic Echo Complete w/ Color, Spectral and Contrast if necessary per protocol     Standing Status:   Future     Standing Expiration Date:   6/24/2023     Scheduling Instructions:      Myocardial strain to be performed during echocardiogram as long as technically feasible     Order Specific Question:   Reason for exam?     Answer:   Dyspnea     Order Specific Question:   Release to patient      Answer:   Immediate   antianginal therapy with short term follow up  Requested Prescriptions     Signed Prescriptions Disp Refills   • amLODIPine (NORVASC) 5 MG tablet 30 tablet 11     Sig: Take 1 tablet by mouth Daily.   • nitroglycerin (NITROSTAT) 0.4 MG SL tablet 25 tablet 3     Si under the tongue as needed for angina, may repeat q5mins for up three doses   • isosorbide mononitrate (IMDUR) 30 MG 24 hr tablet 30 tablet 11     Sig: Take 1 tablet by mouth Daily.      Check BP and heart rates twice daily initially till blood pressures and heart rates under good control and then at least 3x / week,   If blood pressures continue to be well-controlled then can check week a month  at home and bring a recording for review next visit  If BP >130/85 or < 100/60 persistently over 3 reading 30 mins apart or if heart rates persistently above 100 bpm or less than 55 bpm call sooner for evaluation and advise     Monitor for any signs of bleeding including red or dark stools as well as easy bruisabilty. Fall precautions.       Follow up with Veena Pedraza PA-C or myself           Return in about 2 weeks (around 2022).

## 2022-07-05 ENCOUNTER — HOSPITAL ENCOUNTER (OUTPATIENT)
Dept: CARDIOLOGY | Facility: HOSPITAL | Age: 77
Discharge: HOME | End: 2022-07-05

## 2022-07-05 VITALS
HEIGHT: 70 IN | HEART RATE: 71 BPM | WEIGHT: 195.99 LBS | DIASTOLIC BLOOD PRESSURE: 82 MMHG | SYSTOLIC BLOOD PRESSURE: 131 MMHG | BODY MASS INDEX: 28.06 KG/M2

## 2022-07-05 VITALS
BODY MASS INDEX: 28.06 KG/M2 | HEIGHT: 70 IN | WEIGHT: 196 LBS | DIASTOLIC BLOOD PRESSURE: 82 MMHG | SYSTOLIC BLOOD PRESSURE: 131 MMHG

## 2022-07-05 DIAGNOSIS — R06.02 SHORTNESS OF BREATH: ICD-10-CM

## 2022-07-05 DIAGNOSIS — R07.9 CHEST PAIN IN ADULT: ICD-10-CM

## 2022-07-05 PROCEDURE — 25010000002 REGADENOSON 0.4 MG/5ML SOLUTION: Performed by: INTERNAL MEDICINE

## 2022-07-05 PROCEDURE — 93018 CV STRESS TEST I&R ONLY: CPT | Performed by: INTERNAL MEDICINE

## 2022-07-05 PROCEDURE — 93306 TTE W/DOPPLER COMPLETE: CPT

## 2022-07-05 PROCEDURE — A9500 TC99M SESTAMIBI: HCPCS | Performed by: INTERNAL MEDICINE

## 2022-07-05 PROCEDURE — 0 TECHNETIUM SESTAMIBI: Performed by: INTERNAL MEDICINE

## 2022-07-05 PROCEDURE — 78452 HT MUSCLE IMAGE SPECT MULT: CPT | Performed by: INTERNAL MEDICINE

## 2022-07-05 PROCEDURE — 93017 CV STRESS TEST TRACING ONLY: CPT

## 2022-07-05 PROCEDURE — 78452 HT MUSCLE IMAGE SPECT MULT: CPT

## 2022-07-05 PROCEDURE — 25010000002 PERFLUTREN 6.52 MG/ML SUSPENSION: Performed by: INTERNAL MEDICINE

## 2022-07-05 PROCEDURE — 93306 TTE W/DOPPLER COMPLETE: CPT | Performed by: INTERNAL MEDICINE

## 2022-07-05 RX ADMIN — TECHNETIUM TC 99M SESTAMIBI 1 DOSE: 1 INJECTION INTRAVENOUS at 10:45

## 2022-07-05 RX ADMIN — PERFLUTREN 13.04 MG: 6.52 INJECTION, SUSPENSION INTRAVENOUS at 11:55

## 2022-07-05 RX ADMIN — TECHNETIUM TC 99M SESTAMIBI 1 DOSE: 1 INJECTION INTRAVENOUS at 12:42

## 2022-07-05 RX ADMIN — REGADENOSON 0.4 MG: 0.08 INJECTION, SOLUTION INTRAVENOUS at 12:36

## 2022-07-06 LAB
BH CV ECHO MEAS - AO MAX PG: 9 MMHG
BH CV ECHO MEAS - AO MEAN PG: 5 MMHG
BH CV ECHO MEAS - AO ROOT DIAM: 3.2 CM
BH CV ECHO MEAS - AO V2 MAX: 150 CM/SEC
BH CV ECHO MEAS - AO V2 VTI: 32.1 CM
BH CV ECHO MEAS - AVA(I,D): 3 CM2
BH CV ECHO MEAS - EDV(CUBED): 170 ML
BH CV ECHO MEAS - EDV(MOD-SP2): 103 ML
BH CV ECHO MEAS - EDV(MOD-SP4): 145 ML
BH CV ECHO MEAS - EF(MOD-BP): 38.7 %
BH CV ECHO MEAS - EF(MOD-SP2): 37.3 %
BH CV ECHO MEAS - EF(MOD-SP4): 40.8 %
BH CV ECHO MEAS - ESV(CUBED): 85.8 ML
BH CV ECHO MEAS - ESV(MOD-SP2): 64.6 ML
BH CV ECHO MEAS - ESV(MOD-SP4): 85.9 ML
BH CV ECHO MEAS - FS: 20.4 %
BH CV ECHO MEAS - IVS/LVPW: 1.28 CM
BH CV ECHO MEAS - IVSD: 1.2 CM
BH CV ECHO MEAS - LA DIMENSION: 3.6 CM
BH CV ECHO MEAS - LAT PEAK E' VEL: 5.2 CM/SEC
BH CV ECHO MEAS - LV DIASTOLIC VOL/BSA (35-75): 70.1 CM2
BH CV ECHO MEAS - LV MASS(C)D: 235.1 GRAMS
BH CV ECHO MEAS - LV MAX PG: 3.9 MMHG
BH CV ECHO MEAS - LV MEAN PG: 2 MMHG
BH CV ECHO MEAS - LV SYSTOLIC VOL/BSA (12-30): 41.5 CM2
BH CV ECHO MEAS - LV V1 MAX: 98.8 CM/SEC
BH CV ECHO MEAS - LV V1 VTI: 19.4 CM
BH CV ECHO MEAS - LVIDD: 5.5 CM
BH CV ECHO MEAS - LVIDS: 4.4 CM
BH CV ECHO MEAS - LVOT AREA: 4.9 CM2
BH CV ECHO MEAS - LVOT DIAM: 2.5 CM
BH CV ECHO MEAS - LVPWD: 0.93 CM
BH CV ECHO MEAS - MED PEAK E' VEL: 4.6 CM/SEC
BH CV ECHO MEAS - MR MAX PG: 92.2 MMHG
BH CV ECHO MEAS - MR MAX VEL: 480 CM/SEC
BH CV ECHO MEAS - MV A MAX VEL: 112 CM/SEC
BH CV ECHO MEAS - MV DEC SLOPE: 365 CM/SEC2
BH CV ECHO MEAS - MV E MAX VEL: 82.1 CM/SEC
BH CV ECHO MEAS - MV E/A: 0.73
BH CV ECHO MEAS - MV P1/2T: 61.9 MSEC
BH CV ECHO MEAS - MVA(P1/2T): 3.6 CM2
BH CV ECHO MEAS - PA V2 MAX: 89 CM/SEC
BH CV ECHO MEAS - RAP SYSTOLE: 5 MMHG
BH CV ECHO MEAS - RV MAX PG: 1.81 MMHG
BH CV ECHO MEAS - RV V1 MAX: 67.2 CM/SEC
BH CV ECHO MEAS - RV V1 VTI: 14.5 CM
BH CV ECHO MEAS - RVDD: 2.9 CM
BH CV ECHO MEAS - RVSP: 25.6 MMHG
BH CV ECHO MEAS - SI(MOD-SP2): 18.6 ML/M2
BH CV ECHO MEAS - SI(MOD-SP4): 28.6 ML/M2
BH CV ECHO MEAS - SV(LVOT): 95.2 ML
BH CV ECHO MEAS - SV(MOD-SP2): 38.4 ML
BH CV ECHO MEAS - SV(MOD-SP4): 59.1 ML
BH CV ECHO MEAS - TAPSE (>1.6): 2.09 CM
BH CV ECHO MEAS - TR MAX PG: 20.6 MMHG
BH CV ECHO MEAS - TR MAX VEL: 227 CM/SEC
BH CV ECHO MEASUREMENTS AVERAGE E/E' RATIO: 16.76
BH CV NUCLEAR PRIOR STUDY: 3
BH CV REST NUCLEAR ISOTOPE DOSE: 11.9 MCI
BH CV STRESS NUCLEAR ISOTOPE DOSE: 34.2 MCI
BH CV XLRA - RV BASE: 3.5 CM
LEFT ATRIUM VOLUME INDEX: 41.3 ML/M2
LEFT ATRIUM VOLUME: 74.7 ML
LV EF NUC BP: 15 %
MAXIMAL PREDICTED HEART RATE: 145 BPM
MAXIMAL PREDICTED HEART RATE: 145 BPM
STRESS TARGET HR: 123 BPM
STRESS TARGET HR: 123 BPM

## 2022-07-13 ENCOUNTER — OFFICE VISIT (OUTPATIENT)
Dept: CARDIOLOGY | Facility: CLINIC | Age: 77
End: 2022-07-13

## 2022-07-13 VITALS
SYSTOLIC BLOOD PRESSURE: 148 MMHG | WEIGHT: 194 LBS | OXYGEN SATURATION: 98 % | HEIGHT: 70 IN | BODY MASS INDEX: 27.77 KG/M2 | DIASTOLIC BLOOD PRESSURE: 88 MMHG | HEART RATE: 78 BPM

## 2022-07-13 DIAGNOSIS — I10 PRIMARY HYPERTENSION: ICD-10-CM

## 2022-07-13 DIAGNOSIS — Z82.49 FAMILY HISTORY OF EARLY CAD: ICD-10-CM

## 2022-07-13 DIAGNOSIS — I45.9 SKIPPED HEART BEATS: ICD-10-CM

## 2022-07-13 DIAGNOSIS — G45.4 TRANSIENT GLOBAL AMNESIA: ICD-10-CM

## 2022-07-13 DIAGNOSIS — R07.9 CHEST PAIN IN ADULT: Primary | ICD-10-CM

## 2022-07-13 DIAGNOSIS — I49.3 PVC (PREMATURE VENTRICULAR CONTRACTION): ICD-10-CM

## 2022-07-13 DIAGNOSIS — R06.02 SHORTNESS OF BREATH: ICD-10-CM

## 2022-07-13 DIAGNOSIS — G54.0 THORACIC OUTLET SYNDROME: ICD-10-CM

## 2022-07-13 PROCEDURE — 99214 OFFICE O/P EST MOD 30 MIN: CPT | Performed by: INTERNAL MEDICINE

## 2022-07-13 RX ORDER — ATENOLOL 25 MG/1
25 TABLET ORAL DAILY
Qty: 30 TABLET | Refills: 11 | Status: SHIPPED | OUTPATIENT
Start: 2022-07-13 | End: 2022-08-24

## 2022-07-13 RX ORDER — SODIUM CHLORIDE 9 MG/ML
75 INJECTION, SOLUTION INTRAVENOUS CONTINUOUS
Status: CANCELLED | OUTPATIENT
Start: 2022-07-13

## 2022-07-13 NOTE — PROGRESS NOTES
Gallo Philip  [id number]  1945  77 y.o.  male    Referring Provider: Carol Mccallum APRN    Reason for  Visit:    short term office follow up after recent encounter   Initial visit for chest pain and shortness of breath     Subjective     Overall feels the same   No new events or complaints since last visit   Overall the patient feels no major change from baseline symptoms   Similar symptoms as during last visit     Chest pain both with exertion as well as at rest.  Feels episodes of chest pain occur more often with exertion  Moderate substernal,   Pressure like   Chest pain non pleuritic  Chest pain non positional and non gustatory   Lasts less than 5 minutes    Started more than 2 months ago  Occurs once or twice a week on the average but can be variable in frequency  No associated diaphoresis    No associated nausea  No radiation    Relieved with rest    Not positional    No change with intake of food or antacids  No change with breathing  Mild to moderate associated dyspnea    No similar chest pain episodes in the past     Joint pain in small, medium and large joints   Chronic low back pain      BP well controlled at home.     No bleeding, excessive bruising, gait instability or fall risks      In past felt skipped beats   No associated symptoms of dizziness, weakness, chest pain,  shortness of breath       History of present illness:  Gallo Philip is a 77 y.o. yo male with essential hypertension   who presents today for   Chief Complaint   Patient presents with   • Follow-up     2 week follow up   .    History  Past Medical History:   Diagnosis Date   • Hypertension    • Shortness of breath    • Stroke (HCC)     2013 - ON PLAVIX   ,   Past Surgical History:   Procedure Laterality Date   • APPENDECTOMY     • KIDNEY STONE SURGERY     • REPLACEMENT TOTAL KNEE Left 2018   ,   Family History   Problem Relation Age of Onset   • No Known Problems Mother    • Heart attack Father    • Heart disease Father    ,  "  Social History     Tobacco Use   • Smoking status: Former Smoker     Years: 10.00   Substance Use Topics   • Alcohol use: No   • Drug use: No   ,     Medications  Current Outpatient Medications   Medication Sig Dispense Refill   • amLODIPine (NORVASC) 5 MG tablet Take 1 tablet by mouth Daily. 30 tablet 11   • aspirin 81 MG tablet Take 81 mg by mouth Daily.     • clopidogrel (PLAVIX) 75 MG tablet Take 75 mg by mouth Daily.     • isosorbide mononitrate (IMDUR) 30 MG 24 hr tablet Take 1 tablet by mouth Daily. 30 tablet 11   • nitroglycerin (NITROSTAT) 0.4 MG SL tablet 1 under the tongue as needed for angina, may repeat q5mins for up three doses 25 tablet 3   • pantoprazole (PROTONIX) 40 MG EC tablet Take 40 mg by mouth Daily.     • Triamterene-HCTZ (DYAZIDE PO) Take  by mouth.     • valsartan (DIOVAN) 80 MG tablet Take 80 mg by mouth Daily.     • atenolol (TENORMIN) 25 MG tablet Take 1 tablet by mouth Daily. 30 tablet 11     No current facility-administered medications for this visit.       Allergies:  Patient has no known allergies.    Review of Systems  Review of Systems   Constitutional: Negative.   HENT: Negative.    Eyes: Negative.    Cardiovascular: Positive for chest pain and dyspnea on exertion. Negative for claudication, cyanosis, irregular heartbeat, leg swelling, near-syncope, orthopnea, palpitations, paroxysmal nocturnal dyspnea and syncope.   Respiratory: Negative.    Endocrine: Negative.    Hematologic/Lymphatic: Negative.    Skin: Negative.    Musculoskeletal: Positive for arthritis, back pain and joint pain.   Gastrointestinal: Negative for anorexia.   Genitourinary: Negative.    Neurological: Negative.    Psychiatric/Behavioral: Negative.        Objective     Physical Exam:  /88   Pulse 78   Ht 177.8 cm (70\")   Wt 88 kg (194 lb)   SpO2 98%   BMI 27.84 kg/m²     Physical Exam  Constitutional:       Appearance: He is well-developed.   HENT:      Head: Normocephalic.   Neck:      Vascular: " Normal carotid pulses. No carotid bruit or JVD.      Trachea: No tracheal tenderness or tracheal deviation.   Cardiovascular:      Rate and Rhythm: Regular rhythm.      Pulses: Normal pulses.      Heart sounds: Normal heart sounds.   Pulmonary:      Effort: Pulmonary effort is normal.      Breath sounds: No stridor.   Abdominal:      General: There is no distension.      Palpations: Abdomen is soft.      Tenderness: There is no abdominal tenderness.   Musculoskeletal:      Cervical back: No edema.   Skin:     General: Skin is warm.   Neurological:      Mental Status: He is alert.      Cranial Nerves: No cranial nerve deficit.      Sensory: No sensory deficit.   Psychiatric:         Speech: Speech normal.         Behavior: Behavior normal.         Results Review:  Gallo Philip  Echo Complete w/ Doppler, Color Flow and Contrast  Order# [id number]  Reading physician: Hero Cash MD Ordering physician: Hero Cash MD Study date: 17       Patient Information    Patient Name   Gallo Philip MRN   [id number] Legal Sex   Male  (Age)   1945 (77 y.o.)      CV PACS 2     Show images for Adult Transthoracic Echo Complete With Contrast    CV PACS 1     Show images for Adult Transthoracic Echo Complete With Contrast     Sedation Narrator Report    Sedation Narrator Report     Interpretation Summary    · Left ventricular function is normal. Estimated EF = 60%.  · Left ventricular diastolic dysfunction (grade I) consistent with impaired relaxation.  · No evidence of pulmonary hypertension is present       Gallo Philip  Regadenoson Stress Test With Myocardial Perfusion SPECT (Multi Study)  Order# [id number]  Reading physician: Hero Cash MD Ordering physician: Hero Cash MD Study date: 22       Patient Information    Patient Name   Gallo Philip MRN   [id number] Legal Sex   Male  (Age)   1945 (77 y.o.)     Interpretation Summary    · Diaphragmatic attenuation and GI artifacts are  present.  · Large anterior infarction without any significant underlying ischemia  · Left ventricular ejection fraction is severely reduced. (Calculated EF = 15%).  · Akinesis of the proximal to mid left anterior descending coronary artery distribution  · Refer to echo results for better estimation of left ventricular ejection fraction         Results for orders placed during the hospital encounter of 07/05/22    Adult Transthoracic Echo Complete w/ Color, Spectral and Contrast if necessary per protocol    Interpretation Summary  · The left ventricular cavity is mildly dilated.  · Left ventricular ejection fraction appears to be 36 - 40%. Left ventricular systolic function is moderately decreased.  · The following left ventricular wall segments are akinetic: apical anterior, apical lateral, apical inferior, apical septal, apex and mid anteroseptal.  · Left ventricular diastolic function is consistent with (grade I) impaired relaxation.  · Left atrial volume is mildly increased.  · Estimated right ventricular systolic pressure from tricuspid regurgitation is normal (<35 mmHg).  · There is a trivial pericardial effusion. The effusion is fluid filled. There is no evidence of cardiac tamponade.  · Compared to echo dated 4/29/2017 LVEF has dropped from 60% to 36-40% with new wall motion abnormalities        ____________________________________________________________________________________________________________________________________________  Health maintenance and recommendations    Low salt/ HTN/ Heart healthy carbohydrate restricted cardiac diet   The patient is advised to reduce or avoid caffeine or other cardiac stimulants.   Minimize or avoid  NSAID-type medications      Monitor for any signs of bleeding including red or dark stools. Fall precautions.   Advised staying uptodate with immunizations per established standard guidelines.    Offered to give patient  a copy of my notes     Questions were encouraged,  asked and answered to the patient's  understanding and satisfaction. Questions if any regarding current medications and side effects, need for refills and importance of compliance to medications stressed.    Reviewed available prior notes, consults, prior visits, laboratory findings, radiology and cardiology relevant reports. Updated chart as applicable. I have reviewed the patient's medical history in detail and updated the computerized patient record as relevant.      Updated patient regarding any new or relevant abnormalities on review of records or any new findings on physical exam. Mentioned to patient about purpose of visit and desirable health short and long term goals and objectives.    Primary to monitor CBC CMP Lipid panel and TSH as applicable    ___________________________________________________________________________________________________________________________________________   Procedures    Assessment & Plan   Diagnoses and all orders for this visit:    1. Chest pain in adult (Primary)  -     CBC and Differential; Future  -     Comprehensive metabolic panel; Future  -     Protime-INR; Future  -     XR chest pa and lateral; Future  -     Case Request Cath Lab: Cardiac Catheterization/Vascular Study    2. Family history of early CAD    3. Primary hypertension    4. PVC (premature ventricular contraction)    5. Shortness of breath    6. Skipped heart beats    7. Thoracic outlet syndrome    8. Transient global amnesia    Other orders  -     atenolol (TENORMIN) 25 MG tablet; Take 1 tablet by mouth Daily.  Dispense: 30 tablet; Refill: 11  -     Instruct Patient To Stop Apixaban, Rivaroxaban, Edoxaban, Dibigatran, Vorapaxar, Atopaxar 48 Hours Before Scheduled Cardiac Procedure; Future  -     Instruct Patient To Stop Metformin (including any combination product containing Metformin) 48 Hours Before Scheduled Cardiac Procedure; Future          Plan      Patient expressed understanding  Encouraged and  answered all questions   Discussed with the patient and all questioned fully answered. He will call me if any problems arise.   Discussed results of prior testing with patient : echo and myocardial perfusion scan        Recommend cardiac catheterization, selective coronary angiography, left ventriculography and percutaneous coronary intervention with application of arteriotomy hemostatic closure device.    I discussed cardiac catheterization, the procedure, risks (including bleeding, infection, vascular damage [including minor oozing, bruising, bleeding, and up to and including but not limited to the need for vascular surgery, emergency cardiothoracic surgery, contrast reaction, renal failure, respiratory failure, heart attack, stroke, arrhythmia and even death), benefits, and alternatives and the patient has voiced understanding and is willing to proceed.    Adequate pre-hydration and post cardiac catheterization hydration.  Premedications as required and indicated for cardiac catheterization.    No contraindication to drug eluting stent placement if required  Further recommendations pending results of cardiac catheterization      Orders Placed This Encounter   Procedures   • XR chest pa and lateral     Standing Status:   Future     Standing Expiration Date:   7/13/2023     Order Specific Question:   Reason for Exam:     Answer:   cad   • Comprehensive metabolic panel     Standing Status:   Future     Standing Expiration Date:   7/13/2023     Order Specific Question:   Release to patient     Answer:   Immediate   • Protime-INR     Standing Status:   Future     Standing Expiration Date:   7/13/2023   • Instruct Patient To Stop Apixaban, Rivaroxaban, Edoxaban, Dibigatran, Vorapaxar, Atopaxar 48 Hours Before Scheduled Cardiac Procedure     Instruct Patient To Stop Apixaban, Rivaroxaban, Edoxaban, Dibigatran, Vorapaxar, Atopaxar 48 Hours Before Scheduled Cardiac Procedure     Standing Status:   Future     Standing  Expiration Date:   7/13/2023   • Instruct Patient To Stop Metformin (including any combination product containing Metformin) 48 Hours Before Scheduled Cardiac Procedure     Instruct Patient To Stop Metformin (including any combination product containing Metformin) 48 Hours Before Scheduled Cardiac Procedure     Standing Status:   Future     Standing Expiration Date:   7/13/2023   • CBC and Differential     Standing Status:   Future     Standing Expiration Date:   7/13/2023     Order Specific Question:   Manual Differential     Answer:   No        Start low dose beta blocker therapy   Requested Prescriptions     Signed Prescriptions Disp Refills   • atenolol (TENORMIN) 25 MG tablet 30 tablet 11     Sig: Take 1 tablet by mouth Daily.        Check BP and heart rates twice daily initially till blood pressures and heart rates under good control and then at least 3x / week,   If blood pressures continue to be well-controlled then can check week a month  at home and bring a recording for review next visit  If BP >130/85 or < 100/60 persistently over 3 reading 30 mins apart or if heart rates persistently above 100 bpm or less than 55 bpm call sooner for evaluation and advise     Monitor for any signs of bleeding including red or dark stools as well as easy bruisabilty. Fall precautions.       Follow up with Toya MEDINA , Hector MEDINA or Veena Pedraza PA-C         Return in about 6 weeks (around 8/24/2022).

## 2022-08-05 ENCOUNTER — HOSPITAL ENCOUNTER (OUTPATIENT)
Facility: HOSPITAL | Age: 77
Discharge: HOME | End: 2022-08-07
Attending: INTERNAL MEDICINE | Admitting: INTERNAL MEDICINE

## 2022-08-05 DIAGNOSIS — R07.9 CHEST PAIN IN ADULT: ICD-10-CM

## 2022-08-05 PROBLEM — I50.22 CHRONIC SYSTOLIC HEART FAILURE: Status: ACTIVE | Noted: 2022-08-05

## 2022-08-05 LAB
ALBUMIN SERPL-MCNC: 3.8 G/DL (ref 3.5–5.2)
ALBUMIN/GLOB SERPL: 1 G/DL
ALP SERPL-CCNC: 83 U/L (ref 39–117)
ALT SERPL W P-5'-P-CCNC: 20 U/L (ref 1–41)
ANION GAP SERPL CALCULATED.3IONS-SCNC: 11 MMOL/L (ref 5–15)
AST SERPL-CCNC: 20 U/L (ref 1–40)
BASOPHILS # BLD AUTO: 0.06 10*3/MM3 (ref 0–0.2)
BASOPHILS NFR BLD AUTO: 0.5 % (ref 0–1.5)
BILIRUB SERPL-MCNC: 0.5 MG/DL (ref 0–1.2)
BILIRUB UR QL STRIP: NEGATIVE
BUN SERPL-MCNC: 35 MG/DL (ref 8–23)
BUN/CREAT SERPL: 18.5 (ref 7–25)
CALCIUM SPEC-SCNC: 9.5 MG/DL (ref 8.6–10.5)
CHLORIDE SERPL-SCNC: 100 MMOL/L (ref 98–107)
CLARITY UR: CLEAR
CO2 SERPL-SCNC: 24 MMOL/L (ref 22–29)
COLOR UR: YELLOW
CREAT SERPL-MCNC: 1.89 MG/DL (ref 0.76–1.27)
DEPRECATED RDW RBC AUTO: 44 FL (ref 37–54)
EGFRCR SERPLBLD CKD-EPI 2021: 36.6 ML/MIN/1.73
EOSINOPHIL # BLD AUTO: 0.11 10*3/MM3 (ref 0–0.4)
EOSINOPHIL NFR BLD AUTO: 0.9 % (ref 0.3–6.2)
ERYTHROCYTE [DISTWIDTH] IN BLOOD BY AUTOMATED COUNT: 12.7 % (ref 12.3–15.4)
GLOBULIN UR ELPH-MCNC: 3.7 GM/DL
GLUCOSE SERPL-MCNC: 135 MG/DL (ref 65–99)
GLUCOSE UR STRIP-MCNC: NEGATIVE MG/DL
HCT VFR BLD AUTO: 44.4 % (ref 37.5–51)
HGB BLD-MCNC: 14.6 G/DL (ref 13–17.7)
HGB UR QL STRIP.AUTO: NEGATIVE
IMM GRANULOCYTES # BLD AUTO: 0.06 10*3/MM3 (ref 0–0.05)
IMM GRANULOCYTES NFR BLD AUTO: 0.5 % (ref 0–0.5)
INR PPP: 0.97 (ref 0.91–1.09)
KETONES UR QL STRIP: NEGATIVE
LEUKOCYTE ESTERASE UR QL STRIP.AUTO: NEGATIVE
LYMPHOCYTES # BLD AUTO: 2.22 10*3/MM3 (ref 0.7–3.1)
LYMPHOCYTES NFR BLD AUTO: 18.3 % (ref 19.6–45.3)
MCH RBC QN AUTO: 30.9 PG (ref 26.6–33)
MCHC RBC AUTO-ENTMCNC: 32.9 G/DL (ref 31.5–35.7)
MCV RBC AUTO: 94.1 FL (ref 79–97)
MONOCYTES # BLD AUTO: 0.86 10*3/MM3 (ref 0.1–0.9)
MONOCYTES NFR BLD AUTO: 7.1 % (ref 5–12)
NEUTROPHILS NFR BLD AUTO: 72.7 % (ref 42.7–76)
NEUTROPHILS NFR BLD AUTO: 8.79 10*3/MM3 (ref 1.7–7)
NITRITE UR QL STRIP: NEGATIVE
NRBC BLD AUTO-RTO: 0 /100 WBC (ref 0–0.2)
OSMOLALITY UR: 413 MOSM/KG (ref 50–1400)
PH UR STRIP.AUTO: 6 [PH] (ref 5–8)
PLATELET # BLD AUTO: 319 10*3/MM3 (ref 140–450)
PMV BLD AUTO: 10.3 FL (ref 6–12)
POTASSIUM SERPL-SCNC: 3.8 MMOL/L (ref 3.5–5.2)
PROT ?TM UR-MCNC: 9 MG/DL
PROT SERPL-MCNC: 7.5 G/DL (ref 6–8.5)
PROT UR QL STRIP: NEGATIVE
PROTHROMBIN TIME: 12.5 SECONDS (ref 11.9–14.6)
PTH-INTACT SERPL-MCNC: 104.6 PG/ML (ref 15–65)
RBC # BLD AUTO: 4.72 10*6/MM3 (ref 4.14–5.8)
SARS-COV-2 RNA PNL SPEC NAA+PROBE: NOT DETECTED
SODIUM SERPL-SCNC: 135 MMOL/L (ref 136–145)
SODIUM UR-SCNC: 96 MMOL/L
SP GR UR STRIP: 1.02 (ref 1–1.03)
UROBILINOGEN UR QL STRIP: NORMAL
WBC NRBC COR # BLD: 12.1 10*3/MM3 (ref 3.4–10.8)

## 2022-08-05 PROCEDURE — 82570 ASSAY OF URINE CREATININE: CPT | Performed by: CLINICAL NURSE SPECIALIST

## 2022-08-05 PROCEDURE — A9270 NON-COVERED ITEM OR SERVICE: HCPCS | Performed by: INTERNAL MEDICINE

## 2022-08-05 PROCEDURE — 99152 MOD SED SAME PHYS/QHP 5/>YRS: CPT | Performed by: INTERNAL MEDICINE

## 2022-08-05 PROCEDURE — 25010000002 HEPARIN (PORCINE) 2000-0.9 UNIT/L-% SOLUTION: Performed by: INTERNAL MEDICINE

## 2022-08-05 PROCEDURE — 25010000002 MIDAZOLAM HCL (PF) 5 MG/5ML SOLUTION: Performed by: INTERNAL MEDICINE

## 2022-08-05 PROCEDURE — G0378 HOSPITAL OBSERVATION PER HR: HCPCS

## 2022-08-05 PROCEDURE — 25010000002 IOPAMIDOL 61 % SOLUTION: Performed by: INTERNAL MEDICINE

## 2022-08-05 PROCEDURE — C1894 INTRO/SHEATH, NON-LASER: HCPCS | Performed by: INTERNAL MEDICINE

## 2022-08-05 PROCEDURE — 87205 SMEAR GRAM STAIN: CPT | Performed by: CLINICAL NURSE SPECIALIST

## 2022-08-05 PROCEDURE — 84156 ASSAY OF PROTEIN URINE: CPT | Performed by: CLINICAL NURSE SPECIALIST

## 2022-08-05 PROCEDURE — 83970 ASSAY OF PARATHORMONE: CPT | Performed by: INTERNAL MEDICINE

## 2022-08-05 PROCEDURE — 75716 ARTERY X-RAYS ARMS/LEGS: CPT | Performed by: INTERNAL MEDICINE

## 2022-08-05 PROCEDURE — 63710000001 CLOPIDOGREL 75 MG TABLET: Performed by: INTERNAL MEDICINE

## 2022-08-05 PROCEDURE — 83935 ASSAY OF URINE OSMOLALITY: CPT | Performed by: CLINICAL NURSE SPECIALIST

## 2022-08-05 PROCEDURE — 85610 PROTHROMBIN TIME: CPT | Performed by: INTERNAL MEDICINE

## 2022-08-05 PROCEDURE — 25010000002 HEPARIN (PORCINE) 1000-0.9 UT/500ML-% SOLUTION: Performed by: INTERNAL MEDICINE

## 2022-08-05 PROCEDURE — 85025 COMPLETE CBC W/AUTO DIFF WBC: CPT | Performed by: INTERNAL MEDICINE

## 2022-08-05 PROCEDURE — 80053 COMPREHEN METABOLIC PANEL: CPT | Performed by: INTERNAL MEDICINE

## 2022-08-05 PROCEDURE — 93455 CORONARY ART/GRFT ANGIO S&I: CPT | Performed by: INTERNAL MEDICINE

## 2022-08-05 PROCEDURE — C9803 HOPD COVID-19 SPEC COLLECT: HCPCS

## 2022-08-05 PROCEDURE — 87635 SARS-COV-2 COVID-19 AMP PRB: CPT | Performed by: INTERNAL MEDICINE

## 2022-08-05 PROCEDURE — 81003 URINALYSIS AUTO W/O SCOPE: CPT | Performed by: INTERNAL MEDICINE

## 2022-08-05 PROCEDURE — C1760 CLOSURE DEV, VASC: HCPCS | Performed by: INTERNAL MEDICINE

## 2022-08-05 PROCEDURE — 63710000001 ATENOLOL 25 MG TABLET: Performed by: INTERNAL MEDICINE

## 2022-08-05 PROCEDURE — 84300 ASSAY OF URINE SODIUM: CPT | Performed by: CLINICAL NURSE SPECIALIST

## 2022-08-05 PROCEDURE — 25010000002 FENTANYL CITRATE (PF) 50 MCG/ML SOLUTION: Performed by: INTERNAL MEDICINE

## 2022-08-05 PROCEDURE — 25010000002 DIPHENHYDRAMINE PER 50 MG: Performed by: INTERNAL MEDICINE

## 2022-08-05 PROCEDURE — C1769 GUIDE WIRE: HCPCS | Performed by: INTERNAL MEDICINE

## 2022-08-05 PROCEDURE — 63710000001 AMLODIPINE 5 MG TABLET: Performed by: INTERNAL MEDICINE

## 2022-08-05 PROCEDURE — 93458 L HRT ARTERY/VENTRICLE ANGIO: CPT | Performed by: INTERNAL MEDICINE

## 2022-08-05 RX ORDER — PANTOPRAZOLE SODIUM 40 MG/1
40 TABLET, DELAYED RELEASE ORAL
Status: DISCONTINUED | OUTPATIENT
Start: 2022-08-06 | End: 2022-08-07 | Stop reason: HOSPADM

## 2022-08-05 RX ORDER — ATENOLOL 25 MG/1
25 TABLET ORAL DAILY
Status: DISCONTINUED | OUTPATIENT
Start: 2022-08-05 | End: 2022-08-07 | Stop reason: HOSPADM

## 2022-08-05 RX ORDER — ASPIRIN 81 MG/1
81 TABLET ORAL DAILY
Status: DISCONTINUED | OUTPATIENT
Start: 2022-08-06 | End: 2022-08-07 | Stop reason: HOSPADM

## 2022-08-05 RX ORDER — SODIUM CHLORIDE 9 MG/ML
75 INJECTION, SOLUTION INTRAVENOUS CONTINUOUS
Status: DISCONTINUED | OUTPATIENT
Start: 2022-08-05 | End: 2022-08-05 | Stop reason: HOSPADM

## 2022-08-05 RX ORDER — DIPHENHYDRAMINE HYDROCHLORIDE 50 MG/ML
INJECTION INTRAMUSCULAR; INTRAVENOUS AS NEEDED
Status: DISCONTINUED | OUTPATIENT
Start: 2022-08-05 | End: 2022-08-05 | Stop reason: HOSPADM

## 2022-08-05 RX ORDER — MIDAZOLAM HYDROCHLORIDE 1 MG/ML
INJECTION, SOLUTION INTRAMUSCULAR; INTRAVENOUS AS NEEDED
Status: DISCONTINUED | OUTPATIENT
Start: 2022-08-05 | End: 2022-08-05 | Stop reason: HOSPADM

## 2022-08-05 RX ORDER — ONDANSETRON 2 MG/ML
4 INJECTION INTRAMUSCULAR; INTRAVENOUS EVERY 6 HOURS PRN
Status: DISCONTINUED | OUTPATIENT
Start: 2022-08-05 | End: 2022-08-07 | Stop reason: HOSPADM

## 2022-08-05 RX ORDER — ALPRAZOLAM 0.5 MG/1
0.5 TABLET ORAL 3 TIMES DAILY PRN
Status: DISCONTINUED | OUTPATIENT
Start: 2022-08-05 | End: 2022-08-07 | Stop reason: HOSPADM

## 2022-08-05 RX ORDER — ISOSORBIDE MONONITRATE 30 MG/1
30 TABLET, EXTENDED RELEASE ORAL DAILY
Status: DISCONTINUED | OUTPATIENT
Start: 2022-08-06 | End: 2022-08-07 | Stop reason: HOSPADM

## 2022-08-05 RX ORDER — FENTANYL CITRATE 50 UG/ML
INJECTION, SOLUTION INTRAMUSCULAR; INTRAVENOUS AS NEEDED
Status: DISCONTINUED | OUTPATIENT
Start: 2022-08-05 | End: 2022-08-05 | Stop reason: HOSPADM

## 2022-08-05 RX ORDER — HEPARIN SODIUM 200 [USP'U]/100ML
INJECTION, SOLUTION INTRAVENOUS AS NEEDED
Status: DISCONTINUED | OUTPATIENT
Start: 2022-08-05 | End: 2022-08-05 | Stop reason: HOSPADM

## 2022-08-05 RX ORDER — CALCIUM CARBONATE 200(500)MG
2 TABLET,CHEWABLE ORAL 2 TIMES DAILY PRN
Status: DISCONTINUED | OUTPATIENT
Start: 2022-08-05 | End: 2022-08-07 | Stop reason: HOSPADM

## 2022-08-05 RX ORDER — ACETAMINOPHEN 325 MG/1
650 TABLET ORAL EVERY 4 HOURS PRN
Status: DISCONTINUED | OUTPATIENT
Start: 2022-08-05 | End: 2022-08-07 | Stop reason: HOSPADM

## 2022-08-05 RX ORDER — ONDANSETRON 4 MG/1
4 TABLET, FILM COATED ORAL EVERY 6 HOURS PRN
Status: DISCONTINUED | OUTPATIENT
Start: 2022-08-05 | End: 2022-08-07 | Stop reason: HOSPADM

## 2022-08-05 RX ORDER — CLOPIDOGREL BISULFATE 75 MG/1
75 TABLET ORAL DAILY
Status: DISCONTINUED | OUTPATIENT
Start: 2022-08-05 | End: 2022-08-07 | Stop reason: HOSPADM

## 2022-08-05 RX ORDER — DIPHENHYDRAMINE HCL 25 MG
25 CAPSULE ORAL EVERY 6 HOURS PRN
Status: DISCONTINUED | OUTPATIENT
Start: 2022-08-05 | End: 2022-08-07 | Stop reason: HOSPADM

## 2022-08-05 RX ORDER — AMLODIPINE BESYLATE 5 MG/1
5 TABLET ORAL DAILY
Status: DISCONTINUED | OUTPATIENT
Start: 2022-08-05 | End: 2022-08-07 | Stop reason: HOSPADM

## 2022-08-05 RX ORDER — TEMAZEPAM 7.5 MG/1
7.5 CAPSULE ORAL NIGHTLY PRN
Status: DISCONTINUED | OUTPATIENT
Start: 2022-08-05 | End: 2022-08-07 | Stop reason: HOSPADM

## 2022-08-05 RX ORDER — SODIUM CHLORIDE 9 MG/ML
50 INJECTION, SOLUTION INTRAVENOUS CONTINUOUS
Status: DISCONTINUED | OUTPATIENT
Start: 2022-08-05 | End: 2022-08-07 | Stop reason: HOSPADM

## 2022-08-05 RX ORDER — NITROGLYCERIN 0.4 MG/1
0.4 TABLET SUBLINGUAL
Status: DISCONTINUED | OUTPATIENT
Start: 2022-08-05 | End: 2022-08-07 | Stop reason: HOSPADM

## 2022-08-05 RX ADMIN — CLOPIDOGREL 75 MG: 75 TABLET, FILM COATED ORAL at 19:54

## 2022-08-05 RX ADMIN — AMLODIPINE BESYLATE 5 MG: 5 TABLET ORAL at 19:54

## 2022-08-05 RX ADMIN — SODIUM CHLORIDE 50 ML/HR: 9 INJECTION, SOLUTION INTRAVENOUS at 18:23

## 2022-08-05 RX ADMIN — ATENOLOL 25 MG: 25 TABLET ORAL at 19:54

## 2022-08-05 NOTE — PLAN OF CARE
Goal Outcome Evaluation:   Pt arrived from cath lab. No complaints of pain or signs of distress noted. Pt on room air. Pt sitting at 30 degrees eating dinner.

## 2022-08-05 NOTE — CONSULTS
Nephrology ([facility]) Consult Note      Patient:  Gallo Philip  YOB: 1945  Date of Service: 8/5/2022  MRN: [id number]   Acct: [id number]   Primary Care Physician: Carol Mccallum APRN  Advance Directive:   Code Status and Medical Interventions:   Ordered at: 08/05/22 1800     Level Of Support Discussed With:    Patient     Code Status (Patient has no pulse and is not breathing):    CPR (Attempt to Resuscitate)     Medical Interventions (Patient has pulse or is breathing):    Full Support     Admit Date: 8/5/2022       Hospital Day: 0  Referring Provider: Hero Cash MD      Patient Seen, Chart, Consults, Notes, Labs, Radiology studies reviewed.      Subjective:  Gallo Philip is a 77 y.o. male  whom we were consulted for chronic kidney disease stage 3b.  Patient has a history of hypertension and left kidney stone that required several surgeries in 2017.  He only saw urology back then. Patient has since developed CKD but has not seen nephrology.  He presents for recurring chest pain and ruled in for acute myocardial infarction.  He underwent heart catheterization with Dr. Cash on August 5.  No angioplasty or other interventions were done and patient was referred to cardiothoracic surgery for further evaluation.  He was placed on intravenous fluid to hydrate after he received intravenous contrast to decrease the risk of contrast-induced nephropathy.  Renal service was consulted to manage his CKD stage IIIb.  His serum creatinine is now 1.8.  Patient denies dysuria.  He also denies NSAIDs abuse.  He tries to stay well-hydrated.  He does not recall any further episodes of kidney stones.    Allergies:  Patient has no known allergies.    Home Meds:  Medications Prior to Admission   Medication Sig Dispense Refill Last Dose   • amLODIPine (NORVASC) 5 MG tablet Take 1 tablet by mouth Daily. 30 tablet 11 8/4/2022 at Unknown time   • aspirin 81 MG tablet Take 81 mg by mouth Daily.    8/4/2022 at Unknown time   • atenolol (TENORMIN) 25 MG tablet Take 1 tablet by mouth Daily. 30 tablet 11 8/4/2022 at Unknown time   • clopidogrel (PLAVIX) 75 MG tablet Take 75 mg by mouth Daily.   8/4/2022 at Unknown time   • isosorbide mononitrate (IMDUR) 30 MG 24 hr tablet Take 1 tablet by mouth Daily. 30 tablet 11 8/4/2022 at Unknown time   • pantoprazole (PROTONIX) 40 MG EC tablet Take 40 mg by mouth Daily.   8/4/2022 at Unknown time   • nitroglycerin (NITROSTAT) 0.4 MG SL tablet 1 under the tongue as needed for angina, may repeat q5mins for up three doses 25 tablet 3 More than a month at Unknown time       Medicines:  Current Facility-Administered Medications   Medication Dose Route Frequency Provider Last Rate Last Admin   • acetaminophen (TYLENOL) tablet 650 mg  650 mg Oral Q4H PRN Hero Cash MD       • ALPRAZolam (XANAX) tablet 0.5 mg  0.5 mg Oral TID PRN Hero Cash MD       • amLODIPine (NORVASC) tablet 5 mg  5 mg Oral Daily Hero Cash MD       • [START ON 8/6/2022] aspirin EC tablet 81 mg  81 mg Oral Daily Hero Cash MD       • atenolol (TENORMIN) tablet 25 mg  25 mg Oral Daily Hero Cash MD       • calcium carbonate (TUMS) chewable tablet 500 mg (200 mg elemental)  2 tablet Oral BID PRN Hero Cash MD       • clopidogrel (PLAVIX) tablet 75 mg  75 mg Oral Daily Hero Cash MD       • diphenhydrAMINE (BENADRYL) capsule 25 mg  25 mg Oral Q6H PRN Hero Cash MD       • [START ON 8/6/2022] isosorbide mononitrate (IMDUR) 24 hr tablet 30 mg  30 mg Oral Daily Hero Cash MD       • nitroglycerin (NITROSTAT) SL tablet 0.4 mg  0.4 mg Sublingual Q5 Min PRN Hero Cash MD       • ondansetron (ZOFRAN) tablet 4 mg  4 mg Oral Q6H PRN Hero Cash MD        Or   • ondansetron (ZOFRAN) injection 4 mg  4 mg Intravenous Q6H PRN Hero Cash MD       • [START ON 8/6/2022] pantoprazole (PROTONIX) EC tablet 40 mg  40 mg Oral Q AM Hero Cash MD       • sodium chloride 0.9 % infusion  50 mL/hr  "Intravenous Continuous Hero Cash MD 50 mL/hr at 08/05/22 1823 50 mL/hr at 08/05/22 1823   • temazepam (RESTORIL) capsule 7.5 mg  7.5 mg Oral Nightly PRN Hero Cash MD           Past Medical History:  Past Medical History:   Diagnosis Date   • Hypertension    • Shortness of breath    • Stroke (HCC)     2013 - ON PLAVIX       Past Surgical History:  Past Surgical History:   Procedure Laterality Date   • APPENDECTOMY     • KIDNEY STONE SURGERY     • REPLACEMENT TOTAL KNEE Left 2018       Family History  Family History   Problem Relation Age of Onset   • No Known Problems Mother    • Heart attack Father    • Heart disease Father        Social History  Social History     Socioeconomic History   • Marital status:    Tobacco Use   • Smoking status: Former Smoker     Years: 10.00   Substance and Sexual Activity   • Alcohol use: No   • Drug use: No   • Sexual activity: Defer         Review of Systems:  History obtained from chart review and the patient  General ROS: No fever or chills  Respiratory ROS: No cough, shortness of breath, wheezing  Cardiovascular ROS: no chest pain or dyspnea on exertion  Gastrointestinal ROS: No abdominal pain or melena  Genito-Urinary ROS: No dysuria or hematuria  14 point ROS reviewed with the patient and negative except as noted above and in the HPI unless unable to obtain.    Objective:  /78 (BP Location: Left arm, Patient Position: Lying)   Pulse 53   Temp 98.2 °F (36.8 °C) (Oral)   Resp 18   Ht 172.7 cm (68\")   Wt 87.5 kg (192 lb 12.8 oz)   SpO2 97%   BMI 29.32 kg/m²   No intake or output data in the 24 hours ending 08/05/22 1835  General: awake/alert    Head: Atraumatic, normocephalic  Neck: No masses, no jugular venous distention  Chest:  clear to auscultation bilaterally without respiratory distress  CVS: regular rate and rhythm  Abdominal: soft, nontender, normal bowel sounds  Extremities: no cyanosis or edema  Skin: warm and dry without rash  Neuro: No focal " motor deficits  Musculoskeletal: No obvious joint effusions    Labs:  Lab Results (last 72 hours)     Procedure Component Value Units Date/Time    COVID PRE-OP / PRE-PROCEDURE SCREENING ORDER (NO ISOLATION) - Swab, Nasal Cavity [[id number]]  (Normal) Collected: 08/05/22 1627    Specimen: Swab from Nasal Cavity Updated: 08/05/22 1745    Narrative:      The following orders were created for panel order COVID PRE-OP / PRE-PROCEDURE SCREENING ORDER (NO ISOLATION) - Swab, Nasal Cavity.  Procedure                               Abnormality         Status                     ---------                               -----------         ------                     COVID-19,[facility] [employer] [facility]...[[id number]]  Normal              Final result                 Please view results for these tests on the individual orders.    COVID-19,[facility] [employer] IN-HOUSE,Nasal Swab No Transport Media 3-4 HR TAT - Swab, Nasal Cavity [[id number]]  (Normal) Collected: 08/05/22 1627    Specimen: Swab from Nasal Cavity Updated: 08/05/22 1745     COVID19 Not Detected    Narrative:      Fact sheet for providers: https://www.fda.gov/media/571268/download     Fact sheet for patients: https://www.fda.gov/media/923600/download    Test performed by PCR.    Consider negative results in combination with clinical observations, patient history, and epidemiological information.    Protime-INR [[id number]]  (Normal) Collected: 08/05/22 1436    Specimen: Blood Updated: 08/05/22 1457     Protime 12.5 Seconds      INR 0.97    Comprehensive Metabolic Panel [[id number]]  (Abnormal) Collected: 08/05/22 1410    Specimen: Blood Updated: 08/05/22 1435     Glucose 135 mg/dL      BUN 35 mg/dL      Creatinine 1.89 mg/dL      Sodium 135 mmol/L      Potassium 3.8 mmol/L      Comment: Slight hemolysis detected by analyzer. Results may be affected.        Chloride 100 mmol/L      CO2 24.0 mmol/L      Calcium 9.5 mg/dL      Total Protein 7.5 g/dL      Albumin 3.80 g/dL      ALT (SGPT) 20 U/L       AST (SGOT) 20 U/L      Alkaline Phosphatase 83 U/L      Total Bilirubin 0.5 mg/dL      Globulin 3.7 gm/dL      A/G Ratio 1.0 g/dL      BUN/Creatinine Ratio 18.5     Anion Gap 11.0 mmol/L      eGFR 36.6 mL/min/1.73      Comment: National Kidney Foundation and American Society of Nephrology (ASN) Task Force recommended calculation based on the Chronic Kidney Disease Epidemiology Collaboration (CKD-EPI) equation refit without adjustment for race.       Narrative:      GFR Normal >60  Chronic Kidney Disease <60  Kidney Failure <15      CBC & Differential [[id number]]  (Abnormal) Collected: 08/05/22 1410    Specimen: Blood Updated: 08/05/22 1418    Narrative:      The following orders were created for panel order CBC & Differential.  Procedure                               Abnormality         Status                     ---------                               -----------         ------                     CBC Auto Differential[[id number]]        Abnormal            Final result                 Please view results for these tests on the individual orders.    CBC Auto Differential [[id number]]  (Abnormal) Collected: 08/05/22 1410    Specimen: Blood Updated: 08/05/22 1418     WBC 12.10 10*3/mm3      RBC 4.72 10*6/mm3      Hemoglobin 14.6 g/dL      Hematocrit 44.4 %      MCV 94.1 fL      MCH 30.9 pg      MCHC 32.9 g/dL      RDW 12.7 %      RDW-SD 44.0 fl      MPV 10.3 fL      Platelets 319 10*3/mm3      Neutrophil % 72.7 %      Lymphocyte % 18.3 %      Monocyte % 7.1 %      Eosinophil % 0.9 %      Basophil % 0.5 %      Immature Grans % 0.5 %      Neutrophils, Absolute 8.79 10*3/mm3      Lymphocytes, Absolute 2.22 10*3/mm3      Monocytes, Absolute 0.86 10*3/mm3      Eosinophils, Absolute 0.11 10*3/mm3      Basophils, Absolute 0.06 10*3/mm3      Immature Grans, Absolute 0.06 10*3/mm3      nRBC 0.0 /100 WBC           Radiology:   Imaging Results (Last 72 Hours)     ** No results found for the last 72 hours. **           Culture:  No components found for: WOUNDCUL, 3  No components found for: CSFCUL, 3  No components found for: BC, 3  No components found for: URINECUL, 3      Assessment     -Chronic kidney disease stage IIIb  -Hypertensive nephrosclerosis  -Coronary artery disease  -History of kidney stones    Plan:  At this stage, patient would benefit from outpatient renal follow-up.  We will check uric acid level PTH and follow-up on the renal function after he has received contrast.  I will also get a renal ultrasound as baseline because of his prior history of complicated kidney stone disease.  Patient is still avoid nephrotoxins.  He continues to be at higher risk for further renal complication if he requires coronary artery bypass graft surgery.  Currently, patient has no absolute contraindication to proceed with open heart surgery.      Thank you for the consult, we appreciate the opportunity to provide care to your patients.  Feel free to contact me if I can be of any further assistance.      Fermín Rincon MD  8/5/2022  18:35 CDT

## 2022-08-05 NOTE — NURSING NOTE
No complaints of pain or signs of distress noted. Pt on room air. Pt groin soft to touch. drg CDI.

## 2022-08-06 ENCOUNTER — APPOINTMENT (OUTPATIENT)
Dept: ULTRASOUND IMAGING | Facility: HOSPITAL | Age: 77
End: 2022-08-06

## 2022-08-06 ENCOUNTER — APPOINTMENT (OUTPATIENT)
Dept: PULMONOLOGY | Facility: HOSPITAL | Age: 77
End: 2022-08-06

## 2022-08-06 ENCOUNTER — APPOINTMENT (OUTPATIENT)
Dept: CT IMAGING | Facility: HOSPITAL | Age: 77
End: 2022-08-06

## 2022-08-06 LAB
ANION GAP SERPL CALCULATED.3IONS-SCNC: 9 MMOL/L (ref 5–15)
BUN SERPL-MCNC: 37 MG/DL (ref 8–23)
BUN/CREAT SERPL: 17.5 (ref 7–25)
CALCIUM SPEC-SCNC: 9 MG/DL (ref 8.6–10.5)
CHLORIDE SERPL-SCNC: 105 MMOL/L (ref 98–107)
CHOLEST SERPL-MCNC: 187 MG/DL (ref 0–200)
CO2 SERPL-SCNC: 24 MMOL/L (ref 22–29)
CREAT SERPL-MCNC: 2.12 MG/DL (ref 0.76–1.27)
CREAT UR-MCNC: 38.5 MG/DL
DEPRECATED RDW RBC AUTO: 43.9 FL (ref 37–54)
EGFRCR SERPLBLD CKD-EPI 2021: 31.9 ML/MIN/1.73
EOSINOPHIL SPEC QL MICRO: 0 % EOS/100 CELLS (ref 0–0)
ERYTHROCYTE [DISTWIDTH] IN BLOOD BY AUTOMATED COUNT: 12.9 % (ref 12.3–15.4)
GLUCOSE SERPL-MCNC: 122 MG/DL (ref 65–99)
HBA1C MFR BLD: 6.8 % (ref 4.8–5.6)
HCT VFR BLD AUTO: 41.6 % (ref 37.5–51)
HDLC SERPL-MCNC: 41 MG/DL (ref 40–60)
HGB BLD-MCNC: 13.7 G/DL (ref 13–17.7)
LDLC SERPL CALC-MCNC: 118 MG/DL (ref 0–100)
LDLC/HDLC SERPL: 2.79 {RATIO}
MAGNESIUM SERPL-MCNC: 1.7 MG/DL (ref 1.6–2.4)
MCH RBC QN AUTO: 30.8 PG (ref 26.6–33)
MCHC RBC AUTO-ENTMCNC: 32.9 G/DL (ref 31.5–35.7)
MCV RBC AUTO: 93.5 FL (ref 79–97)
PHOSPHATE SERPL-MCNC: 3.3 MG/DL (ref 2.5–4.5)
PLATELET # BLD AUTO: 279 10*3/MM3 (ref 140–450)
PMV BLD AUTO: 10.2 FL (ref 6–12)
POTASSIUM SERPL-SCNC: 4.2 MMOL/L (ref 3.5–5.2)
RBC # BLD AUTO: 4.45 10*6/MM3 (ref 4.14–5.8)
SODIUM SERPL-SCNC: 138 MMOL/L (ref 136–145)
TRIGL SERPL-MCNC: 158 MG/DL (ref 0–150)
URATE SERPL-MCNC: 9.4 MG/DL (ref 3.4–7)
VLDLC SERPL-MCNC: 28 MG/DL (ref 5–40)
WBC NRBC COR # BLD: 8.96 10*3/MM3 (ref 3.4–10.8)

## 2022-08-06 PROCEDURE — 80048 BASIC METABOLIC PNL TOTAL CA: CPT | Performed by: INTERNAL MEDICINE

## 2022-08-06 PROCEDURE — G0378 HOSPITAL OBSERVATION PER HR: HCPCS

## 2022-08-06 PROCEDURE — 83735 ASSAY OF MAGNESIUM: CPT | Performed by: CLINICAL NURSE SPECIALIST

## 2022-08-06 PROCEDURE — 99204 OFFICE O/P NEW MOD 45 MIN: CPT | Performed by: SURGERY

## 2022-08-06 PROCEDURE — A9270 NON-COVERED ITEM OR SERVICE: HCPCS | Performed by: INTERNAL MEDICINE

## 2022-08-06 PROCEDURE — 63710000001 CLOPIDOGREL 75 MG TABLET: Performed by: INTERNAL MEDICINE

## 2022-08-06 PROCEDURE — 63710000001 ALLOPURINOL 100 MG TABLET: Performed by: INTERNAL MEDICINE

## 2022-08-06 PROCEDURE — 76775 US EXAM ABDO BACK WALL LIM: CPT

## 2022-08-06 PROCEDURE — A9270 NON-COVERED ITEM OR SERVICE: HCPCS | Performed by: PHYSICIAN ASSISTANT

## 2022-08-06 PROCEDURE — 84100 ASSAY OF PHOSPHORUS: CPT | Performed by: CLINICAL NURSE SPECIALIST

## 2022-08-06 PROCEDURE — 83036 HEMOGLOBIN GLYCOSYLATED A1C: CPT | Performed by: INTERNAL MEDICINE

## 2022-08-06 PROCEDURE — 63710000001 PANTOPRAZOLE 40 MG TABLET DELAYED-RELEASE: Performed by: INTERNAL MEDICINE

## 2022-08-06 PROCEDURE — 63710000001 AMLODIPINE 5 MG TABLET: Performed by: INTERNAL MEDICINE

## 2022-08-06 PROCEDURE — 80061 LIPID PANEL: CPT | Performed by: INTERNAL MEDICINE

## 2022-08-06 PROCEDURE — 63710000001 ASPIRIN 81 MG TABLET DELAYED-RELEASE: Performed by: INTERNAL MEDICINE

## 2022-08-06 PROCEDURE — 94010 BREATHING CAPACITY TEST: CPT | Performed by: INTERNAL MEDICINE

## 2022-08-06 PROCEDURE — 84550 ASSAY OF BLOOD/URIC ACID: CPT | Performed by: INTERNAL MEDICINE

## 2022-08-06 PROCEDURE — 63710000001 CALCITRIOL 0.25 MCG CAPSULE: Performed by: INTERNAL MEDICINE

## 2022-08-06 PROCEDURE — 71250 CT THORAX DX C-: CPT

## 2022-08-06 PROCEDURE — 94010 BREATHING CAPACITY TEST: CPT | Performed by: NURSE PRACTITIONER

## 2022-08-06 PROCEDURE — 63710000001 ATORVASTATIN 40 MG TABLET: Performed by: PHYSICIAN ASSISTANT

## 2022-08-06 PROCEDURE — 85027 COMPLETE CBC AUTOMATED: CPT | Performed by: INTERNAL MEDICINE

## 2022-08-06 PROCEDURE — 99224 PR SBSQ OBSERVATION CARE/DAY 15 MINUTES: CPT | Performed by: INTERNAL MEDICINE

## 2022-08-06 PROCEDURE — 63710000001 ISOSORBIDE MONONITRATE 30 MG TABLET SUSTAINED-RELEASE 24 HOUR: Performed by: INTERNAL MEDICINE

## 2022-08-06 RX ORDER — CALCITRIOL 0.25 UG/1
0.25 CAPSULE, LIQUID FILLED ORAL DAILY
Status: DISCONTINUED | OUTPATIENT
Start: 2022-08-06 | End: 2022-08-07 | Stop reason: HOSPADM

## 2022-08-06 RX ORDER — TRIAMTERENE AND HYDROCHLOROTHIAZIDE 37.5; 25 MG/1; MG/1
1 TABLET ORAL DAILY
COMMUNITY
End: 2022-08-07 | Stop reason: HOSPADM

## 2022-08-06 RX ORDER — ATORVASTATIN CALCIUM 40 MG/1
40 TABLET, FILM COATED ORAL NIGHTLY
Status: DISCONTINUED | OUTPATIENT
Start: 2022-08-06 | End: 2022-08-07 | Stop reason: HOSPADM

## 2022-08-06 RX ORDER — ALLOPURINOL 100 MG/1
200 TABLET ORAL DAILY
Status: DISCONTINUED | OUTPATIENT
Start: 2022-08-06 | End: 2022-08-07 | Stop reason: HOSPADM

## 2022-08-06 RX ADMIN — ISOSORBIDE MONONITRATE 30 MG: 30 TABLET, EXTENDED RELEASE ORAL at 08:25

## 2022-08-06 RX ADMIN — PANTOPRAZOLE SODIUM 40 MG: 40 TABLET, DELAYED RELEASE ORAL at 05:31

## 2022-08-06 RX ADMIN — SODIUM CHLORIDE 50 ML/HR: 9 INJECTION, SOLUTION INTRAVENOUS at 13:48

## 2022-08-06 RX ADMIN — CALCITRIOL 0.25 MCG: 0.25 CAPSULE ORAL at 18:04

## 2022-08-06 RX ADMIN — ATORVASTATIN CALCIUM 40 MG: 40 TABLET, FILM COATED ORAL at 20:06

## 2022-08-06 RX ADMIN — CLOPIDOGREL 75 MG: 75 TABLET, FILM COATED ORAL at 17:10

## 2022-08-06 RX ADMIN — ASPIRIN 81 MG: 81 TABLET, COATED ORAL at 08:25

## 2022-08-06 RX ADMIN — ALLOPURINOL 200 MG: 100 TABLET ORAL at 18:04

## 2022-08-06 RX ADMIN — AMLODIPINE BESYLATE 5 MG: 5 TABLET ORAL at 08:25

## 2022-08-06 NOTE — PLAN OF CARE
Goal Outcome Evaluation:         VSS. No c/o of pain this shift. S/SB 48-70 on tele with BBB and PVCs. Right groin cath site clean, dry, intact, and soft. Voiding per urinal. Up ad glendy. Has been NPO starting at midnight. Safety maintained, will continue to monitor and notify MD of any changes.

## 2022-08-06 NOTE — CONSULTS
"Referring Provider: Dr. Cash  Reason for Consultation: CABG    Patient Care Team:  Carol Mccallum APRN as PCP - General (Nurse Practitioner)  Javier Gan MD as Referring Physician (General Practice)  Hero Cash MD as Cardiologist (Cardiology)  Carol Mccallum APRN as Referring Physician (Nurse Practitioner)    Chief complaint chest pain    Subjective .     History of present illness: Patient reports chest pain on 3 separate occasions.  Twice over the last month.  He reports he is employed as a full-time .  He is able to climb up and down on the ladder and roof without complaint.  He had an episode while loading music equipment into a vehicle.  Symptoms lasted approximately 15 minutes.  They did improve with rest.  He reports the last episode \"lasted a little longer\".  He is currently not having any discomfort.  He is breathing comfortably on room air.  IV fluids at 50 mils an hour.  He reports a history of a TIA in 2014.  Symptom at that time included left facial tingling.  MRI negative.  He does not smoke or drink.  He has a history of kidney stones resulting in some chronic kidney damage.  He received contrast yesterday for heart catheterization.  Creatinine slightly increased.  Catheterization showed blockage in the LAD.  Wife at bedside.    History  Code Status and Medical Interventions:   Ordered at: 08/05/22 1800     Level Of Support Discussed With:    Patient     Code Status (Patient has no pulse and is not breathing):    CPR (Attempt to Resuscitate)     Medical Interventions (Patient has pulse or is breathing):    Full Support         Past Medical History:   Diagnosis Date   • Hypertension    • Shortness of breath    • Stroke (HCC)     2013 - ON PLAVIX   ,   Past Surgical History:   Procedure Laterality Date   • APPENDECTOMY     • KIDNEY STONE SURGERY     • REPLACEMENT TOTAL KNEE Left 2018   ,   Family History   Problem Relation Age of Onset   • No Known Problems Mother    • Heart attack Father    • " Heart disease Father    ,   Social History     Tobacco Use   • Smoking status: Former Smoker     Years: 10.00   Substance Use Topics   • Alcohol use: No   • Drug use: No   ,   Medications Prior to Admission   Medication Sig Dispense Refill Last Dose   • amLODIPine (NORVASC) 5 MG tablet Take 1 tablet by mouth Daily. 30 tablet 11 8/4/2022 at Unknown time   • aspirin 81 MG tablet Take 81 mg by mouth Daily.   8/4/2022 at Unknown time   • atenolol (TENORMIN) 25 MG tablet Take 1 tablet by mouth Daily. 30 tablet 11 8/4/2022 at Unknown time   • clopidogrel (PLAVIX) 75 MG tablet Take 75 mg by mouth Daily.   8/4/2022 at Unknown time   • isosorbide mononitrate (IMDUR) 30 MG 24 hr tablet Take 1 tablet by mouth Daily. 30 tablet 11 8/4/2022 at Unknown time   • pantoprazole (PROTONIX) 40 MG EC tablet Take 40 mg by mouth Daily.   8/4/2022 at Unknown time   • nitroglycerin (NITROSTAT) 0.4 MG SL tablet 1 under the tongue as needed for angina, may repeat q5mins for up three doses 25 tablet 3 More than a month at Unknown time   , Allergies: Patient has no known allergies.    Review of Systems  Review of Systems   Constitutional: Negative for chills, fatigue and fever.   HENT: Negative for congestion, postnasal drip and rhinorrhea.    Eyes: Negative for pain, discharge and itching.   Respiratory: Positive for chest tightness. Negative for cough, shortness of breath and wheezing.    Cardiovascular: Positive for chest pain. Negative for palpitations and leg swelling.   Gastrointestinal: Negative for abdominal distention, constipation, diarrhea and vomiting.   Endocrine: Negative for polydipsia, polyphagia and polyuria.   Genitourinary: Negative for dysuria, frequency and urgency.   Musculoskeletal: Negative for back pain, gait problem and joint swelling.   Skin: Negative for color change, pallor and rash.   Neurological: Negative for light-headedness.   Hematological: Negative for adenopathy. Does not bruise/bleed easily.  "  Psychiatric/Behavioral: Negative for self-injury. The patient is not nervous/anxious and is not hyperactive.         Objective     Vital Signs   Visit Vitals  /56 (BP Location: Right arm, Patient Position: Lying)   Pulse 62   Temp 98.4 °F (36.9 °C) (Oral)   Resp 16   Ht 172.7 cm (68\")   Wt 87.5 kg (193 lb)   SpO2 96%   BMI 29.35 kg/m²       Physical Exam  Vitals and nursing note reviewed. Exam conducted with a chaperone present.   Constitutional:       General: He is not in acute distress.     Appearance: Normal appearance. He is obese.   HENT:      Head: Normocephalic.      Right Ear: External ear normal.      Left Ear: External ear normal.      Nose: Nose normal.      Mouth/Throat:      Mouth: Mucous membranes are moist.   Eyes:      General:         Right eye: No discharge.      Conjunctiva/sclera: Conjunctivae normal.      Pupils: Pupils are equal, round, and reactive to light.      Comments: Glasses   Cardiovascular:      Rate and Rhythm: Regular rhythm. Tachycardia present.      Pulses: Normal pulses.      Heart sounds: No murmur heard.  Pulmonary:      Effort: Pulmonary effort is normal. No respiratory distress.      Breath sounds: Normal breath sounds. No stridor.   Abdominal:      General: Abdomen is flat. Bowel sounds are normal. There is no distension.      Palpations: Abdomen is soft. There is no mass.   Musculoskeletal:         General: Normal range of motion.      Cervical back: Normal range of motion and neck supple. No rigidity or tenderness.      Right lower leg: No edema.      Left lower leg: No edema.   Skin:     General: Skin is warm and dry.      Coloration: Skin is not jaundiced or pale.   Neurological:      General: No focal deficit present.      Mental Status: He is alert and oriented to person, place, and time. Mental status is at baseline.   Psychiatric:         Mood and Affect: Mood normal.         Behavior: Behavior normal.         Thought Content: Thought content normal. "         LAB:   CBC:  Results from last 7 days   Lab Units 08/06/22  0430 08/05/22  1410   WBC 10*3/mm3 8.96 12.10*   HEMATOCRIT % 41.6 44.4   PLATELETS 10*3/mm3 279 319          BMP:)  Results from last 7 days   Lab Units 08/06/22  0430 08/05/22  1410   SODIUM mmol/L 138 135*   POTASSIUM mmol/L 4.2 3.8   CHLORIDE mmol/L 105 100   CO2 mmol/L 24.0 24.0   GLUCOSE mg/dL 122* 135*   BUN mg/dL 37* 35*   CREATININE mg/dL 2.12* 1.89*           COAG:  Results from last 7 days   Lab Units 08/05/22  1436   INR  0.97           IMAGES:       Imaging Results (Last 24 Hours)     Procedure Component Value Units Date/Time    CT Chest Without Contrast Diagnostic [[id number]] Collected: 08/06/22 1139     Updated: 08/06/22 1149    Narrative:      EXAMINATION: CT CHEST WO CONTRAST DIAGNOSTIC- 8/6/2022 11:39 AM CDT     HISTORY: Chest pain, nonspecific     DOSE: 294 mGycm (Automatic exposure control technique was implemented in  an effort to keep the radiation dose as low as possible without  compromising image quality)     REPORT: Spiral CT of the chest was performed without intravenous  contrast from the thoracic inlet through the upper abdomen.  Reconstructed coronal and sagittal images are also reviewed.     Comparison: There are no correlative imaging studies for comparison.     Review of lung windows demonstrates hyperinflation of the lungs, with no  visible bullous changes. There is no evidence of pneumonia. No lung mass  or suspicious pulmonary nodule is identified. There is abundant  subpleural fat bilaterally and slightly greater on the right. No  pathologic pleural thickening is identified. There is subtle subpleural  parenchymal opacities in the upper lung zones which may represent mild  atelectasis or scarring. No pleural effusion is identified. Soft tissue  windows demonstrate a normal appearance of the thyroid gland. No  intrathoracic lymphadenopathy is identified. A small amount calcified  plaque is noted at the aortic  arch, with normal aortic caliber. There is  heavy calcified plaque within the LAD coronary artery distribution and  small amount within the other coronary arteries. Heart size is normal.  The pulmonary arteries are normal caliber. In the upper abdomen,  moderate volume of the foot stuff is noted in the stomach. The  gallbladder appears partially contracted. The kidneys are atrophic, with  lobular contours. The appearance suggests chronic renal disease. There  is a small cyst at the upper pole the right kidney that measures  maximally 18 mm. Review of bone windows shows no acute abnormality.       Impression:      1. No acute intrathoracic abnormality. Hyperinflation of the lungs  compatible with mild COPD. No pulmonary infiltrate is identified. There  is no pneumothorax or pleural effusion.  2. Diffuse coronary artery calcified plaque, greatest at the LAD  distribution. Normal caliber of the thoracic aorta and pulmonary  arteries.  3. Atrophy of the kidneys and probable chronic renal disease. Clinical  correlation is recommended. 18 mm cyst at the upper pole of the right  kidney.              This report was finalized on 08/06/2022 11:46 by Dr. Jimbo Lopez MD.    US Renal Bilateral [[id number]] Collected: 08/06/22 0947     Updated: 08/06/22 0952    Narrative:      EXAMINATION: US RENAL BILATERAL- 8/6/2022 9:47 AM CDT     HISTORY: elevated BUN/Cr; R07.9-Chest pain, unspecified     REPORT: Sonographic images of the kidneys were obtained.     COMPARISON: There are no correlative imaging studies for comparison.     The right kidney measures 10.9 x 5.5 x 5.0 cm and shows diffuse cortical  thinning and mildly increased cortical echogenicity. No hydronephrosis  or solid mass is identified. There is a benign-appearing cyst at the  upper pole that measures 2.1 x 1.5 x 1.6 cm. Color Doppler images  demonstrate vascular flow within the right kidney.     The bladder is not well distended but appears within normal limits.  "The  prostate gland is enlarged and measures 5.6 x 5.9 x 5.5 cm. Clinical  correlation is recommended.     The left kidney measures 10.3 x 5.4 x 4.5 cm and shows diffusely  increased cortical echogenicity and mild cortical thinning. No mass or  hydronephrosis is identified. Color Doppler images demonstrate vascular  flow within the left kidney.       Impression:      1. Abnormally increased cortical echogenicity of both kidneys, which may  be related to chronic \"medical\" renal disease. There is also diffuse  cortical thinning, greater on the right. This causes a somewhat lobular  cortical contour. No renal mass or hydronephrosis is identified.  2. Prostatomegaly, probable BPH. Clinical correlation is recommended.  This report was finalized on 08/06/2022 09:49 by Dr. Jimbo Lopez MD.                       Assessment & Plan      Chest pain in adult    Chronic systolic heart failure (CMS/HCC)  Obesity    Patient will need to undergo cardiac MRI prior to determining need for CABG. Dr. Griffin has discussed case with Dr. Cash this morning  Will need follow-up in the clinic to discuss following cardiac MRI  US carotids  Vein mapping  PFTs  CT chest without    Delfina Gray, APRN  08/06/22  11:53 CDT  "

## 2022-08-06 NOTE — PROGRESS NOTES
"[facility], [facility] Progress Note     LOS: 0 days   Patient Care Team:  Carol Mccallum APRN as PCP - General (Nurse Practitioner)  Javier Gan MD as Referring Physician (General Practice)  Hero Cash MD as Cardiologist (Cardiology)  Carol Mccallum APRN as Referring Physician (Nurse Practitioner)    Chief Complaint:  Chest pain    Subjective     Patient is doing well without chest pain or shortness of breath.  No groin tenderness or pain.    Review of Systems:   A 10-point review of systems is obtained and negative except for otherwise mentioned above.    Objective     Vital Sign Min/Max for last 24 hours  Temp  Min: 98 °F (36.7 °C)  Max: 98.4 °F (36.9 °C)   BP  Min: 113/55  Max: 135/78   Pulse  Min: 51  Max: 64   Resp  Min: 16  Max: 18   SpO2  Min: 96 %  Max: 100 %   No data recorded   Weight  Min: 87.5 kg (193 lb)  Max: 87.5 kg (193 lb)     Flowsheet Rows    Flowsheet Row First Filed Value   Admission Height 172.7 cm (68\") Documented at 08/05/2022 1350   Admission Weight 87.5 kg (192 lb 12.8 oz) Documented at 08/05/2022 1350          Physical Exam:   General Appearance: NAD, wife at bedside  Lungs: CTAB  Heart: RRR  Extremities: Groin ok.  No drainage or hematoma.  Distal pulses intact.     Results Review:     I reviewed the patient's new clinical results.    Results from last 7 days   Lab Units 08/06/22  0430   WBC 10*3/mm3 8.96   HEMOGLOBIN g/dL 13.7   HEMATOCRIT % 41.6   PLATELETS 10*3/mm3 279     Results from last 7 days   Lab Units 08/06/22  0430   SODIUM mmol/L 138   POTASSIUM mmol/L 4.2   CHLORIDE mmol/L 105   CO2 mmol/L 24.0   BUN mg/dL 37*   CREATININE mg/dL 2.12*   GLUCOSE mg/dL 122*   CALCIUM mg/dL 9.0     Results from last 7 days   Lab Units 08/06/22  0430 08/05/22  1410   SODIUM mmol/L 138 135*   POTASSIUM mmol/L 4.2 3.8   CHLORIDE mmol/L 105 100   CO2 mmol/L 24.0 24.0   BUN mg/dL 37* 35*   CREATININE mg/dL 2.12* 1.89*   CALCIUM mg/dL 9.0 9.5   BILIRUBIN mg/dL  " --  0.5   ALK PHOS U/L  --  83   ALT (SGPT) U/L  --  20   AST (SGOT) U/L  --  20   GLUCOSE mg/dL 122* 135*             Results from last 7 days   Lab Units 08/06/22  0430   CHOLESTEROL mg/dL 187   TRIGLYCERIDES mg/dL 158*   HDL CHOL mg/dL 41   LDL CHOL mg/dL 118*       Medication Review: yes    Assessment & Plan     CAD, possible CABG candidate:  Preoperative workup is underway.  Possible cardiac MRI in [place].  Continue ASA, amlodipine, atenolol, Plavix, Imdur.    Essential hypertension:  Well controlled.  Continue atenolol, amlodipine.    Hyperlipidemia:  Start statin with LDL goal <70.    CKD stage IIIb:  Cr rising.  Nephrology following.    Appreciate the assistance of consultants.          Devika Carreon PA-C  08/06/22  14:05 CDT

## 2022-08-06 NOTE — PLAN OF CARE
Goal Outcome Evaluation:   Pt resting in bed. No complaints of pain or signs of distress noted. Pt on room air. Pt HR NSR/SA 51-67.

## 2022-08-06 NOTE — PROGRESS NOTES
PROGRESS NOTE.      Patient:  Gallo Philip  YOB: 1945  Date of Service: 8/6/2022  MRN: [id number]   Acct: [id number]   Primary Care Physician: Carol Mccallum APRN  Advance Directive:   Code Status and Medical Interventions:   Ordered at: 08/05/22 1800     Level Of Support Discussed With:    Patient     Code Status (Patient has no pulse and is not breathing):    CPR (Attempt to Resuscitate)     Medical Interventions (Patient has pulse or is breathing):    Full Support     Admit Date: 8/5/2022       Hospital Day: 0  Referring Provider: Hero Cash MD      Patient Seen, Chart, Consults, Notes, Labs, Radiology studies reviewed.      Subjective:    Gallo Philip is a 77 y.o. male  whom we were consulted for chronic kidney disease stage 3b.  Patient has a history of hypertension and left kidney stone that required several surgeries in 2017.  He only saw urology back then. Patient has since developed CKD but has not seen nephrology.  He presents for recurring chest pain and ruled in for acute myocardial infarction.  He underwent heart catheterization with Dr. Cash on August 5.  No angioplasty or other interventions were done and patient was referred to cardiothoracic surgery for further evaluation.  He was placed on intravenous fluid to hydrate after he received intravenous contrast to decrease the risk of contrast-induced nephropathy.  Renal service was consulted to manage his CKD stage IIIb.  His serum creatinine is now 1.8.  Patient denies dysuria.  He also denies NSAIDs abuse.  He tries to stay well-hydrated.  He does not recall any further episodes of kidney stones.  Today, patient offers no new complaints.  He is anxious to go home and is awaiting final decision about his upcoming heart surgery.    Allergies:  Patient has no known allergies.    Home Meds:  Medications Prior to Admission   Medication Sig Dispense Refill Last Dose   • amLODIPine (NORVASC) 5 MG tablet Take 1 tablet by mouth Daily.  30 tablet 11 8/4/2022 at Unknown time   • aspirin 81 MG tablet Take 81 mg by mouth Daily.   8/4/2022 at Unknown time   • atenolol (TENORMIN) 25 MG tablet Take 1 tablet by mouth Daily. 30 tablet 11 8/4/2022 at Unknown time   • clopidogrel (PLAVIX) 75 MG tablet Take 75 mg by mouth Daily.   8/4/2022 at Unknown time   • isosorbide mononitrate (IMDUR) 30 MG 24 hr tablet Take 1 tablet by mouth Daily. 30 tablet 11 8/4/2022 at Unknown time   • nitroglycerin (NITROSTAT) 0.4 MG SL tablet 1 under the tongue as needed for angina, may repeat q5mins for up three doses 25 tablet 3 More than a month at Unknown time   • triamterene-hydrochlorothiazide (MAXZIDE-25) 37.5-25 MG per tablet Take 1 tablet by mouth Daily.          Medicines:  Current Facility-Administered Medications   Medication Dose Route Frequency Provider Last Rate Last Admin   • acetaminophen (TYLENOL) tablet 650 mg  650 mg Oral Q4H PRN Hero Cash MD       • ALPRAZolam (XANAX) tablet 0.5 mg  0.5 mg Oral TID PRN Hero Cash MD       • amLODIPine (NORVASC) tablet 5 mg  5 mg Oral Daily Hero Cash MD   5 mg at 08/06/22 0825   • aspirin EC tablet 81 mg  81 mg Oral Daily Hero Cash MD   81 mg at 08/06/22 0825   • atenolol (TENORMIN) tablet 25 mg  25 mg Oral Daily Hero Cash MD   25 mg at 08/05/22 1954   • atorvastatin (LIPITOR) tablet 40 mg  40 mg Oral Nightly Devika Carreon PA-C       • calcium carbonate (TUMS) chewable tablet 500 mg (200 mg elemental)  2 tablet Oral BID PRN Hero Cash MD       • clopidogrel (PLAVIX) tablet 75 mg  75 mg Oral Daily Hero Cash MD   75 mg at 08/06/22 1710   • diphenhydrAMINE (BENADRYL) capsule 25 mg  25 mg Oral Q6H PRN Hero Cash MD       • isosorbide mononitrate (IMDUR) 24 hr tablet 30 mg  30 mg Oral Daily Hero Cash MD   30 mg at 08/06/22 0825   • nitroglycerin (NITROSTAT) SL tablet 0.4 mg  0.4 mg Sublingual Q5 Min PRN Hero Cash MD       • ondansetron (ZOFRAN) tablet 4 mg  4 mg Oral Q6H PRN Shreya  "MD Hero        Or   • ondansetron (ZOFRAN) injection 4 mg  4 mg Intravenous Q6H PRN Hero Cash MD       • pantoprazole (PROTONIX) EC tablet 40 mg  40 mg Oral Q AM Hero Cash MD   40 mg at 08/06/22 0531   • sodium chloride 0.9 % infusion  50 mL/hr Intravenous Continuous Hero Cash MD 50 mL/hr at 08/06/22 1711 50 mL/hr at 08/06/22 1711   • temazepam (RESTORIL) capsule 7.5 mg  7.5 mg Oral Nightly PRN Hero Cash MD           Past Medical History:  Past Medical History:   Diagnosis Date   • Hypertension    • Shortness of breath    • Stroke (HCC)     2013 - ON PLAVIX       Past Surgical History:  Past Surgical History:   Procedure Laterality Date   • APPENDECTOMY     • KIDNEY STONE SURGERY     • REPLACEMENT TOTAL KNEE Left 2018       Family History  Family History   Problem Relation Age of Onset   • No Known Problems Mother    • Heart attack Father    • Heart disease Father        Social History  Social History     Socioeconomic History   • Marital status:    Tobacco Use   • Smoking status: Former Smoker     Years: 10.00   Substance and Sexual Activity   • Alcohol use: No   • Drug use: No   • Sexual activity: Defer       Review of Systems:  History obtained from chart review and the patient  General ROS: No fever or chills  Respiratory ROS: No cough, shortness of breath, wheezing  Cardiovascular ROS: no chest pain or dyspnea on exertion  Gastrointestinal ROS: No abdominal pain or melena  Genito-Urinary ROS: No dysuria or hematuria  Musculoskeletal: negative  Skin: negative    Objective:  /61 (BP Location: Right arm, Patient Position: Sitting)   Pulse 58   Temp 98.1 °F (36.7 °C) (Oral)   Resp 16   Ht 172.7 cm (68\")   Wt 87.5 kg (193 lb)   SpO2 98%   BMI 29.35 kg/m²     Intake/Output Summary (Last 24 hours) at 8/6/2022 1711  Last data filed at 8/6/2022 1300  Gross per 24 hour   Intake 240 ml   Output 1750 ml   Net -1510 ml       Physical examination:  General: awake/alert   Chest:  clear " to auscultation bilaterally without respiratory distress  CVS: regular rate and rhythm  Abdominal: soft, nontender, normal bowel sounds  Extremities: no cyanosis or edema  Skin: warm and dry without rash  Neuro: No focal motor deficits    Labs:  Lab Results (last 24 hours)     Procedure Component Value Units Date/Time    Eosinophil Smear - Urine, Urine, Clean Catch [[id number]]  (Normal) Collected: 08/05/22 1956    Specimen: Urine, Clean Catch Updated: 08/06/22 1402     Eosinophil Smear 0 % EOS/100 Cells     Creatinine, Urine, Random - Urine, Clean Catch [[id number]] Collected: 08/05/22 1956    Specimen: Urine, Clean Catch Updated: 08/06/22 1242     Creatinine, Urine 38.5 mg/dL     Narrative:      Reference intervals for random urine have not been established.  Clinical usage is dependent upon physician's interpretation in combination with other laboratory tests.       Basic Metabolic Panel [[id number]]  (Abnormal) Collected: 08/06/22 0430    Specimen: Blood Updated: 08/06/22 0509     Glucose 122 mg/dL      BUN 37 mg/dL      Creatinine 2.12 mg/dL      Sodium 138 mmol/L      Potassium 4.2 mmol/L      Chloride 105 mmol/L      CO2 24.0 mmol/L      Calcium 9.0 mg/dL      BUN/Creatinine Ratio 17.5     Anion Gap 9.0 mmol/L      eGFR 31.9 mL/min/1.73      Comment: National Kidney Foundation and American Society of Nephrology (ASN) Task Force recommended calculation based on the Chronic Kidney Disease Epidemiology Collaboration (CKD-EPI) equation refit without adjustment for race.       Narrative:      GFR Normal >60  Chronic Kidney Disease <60  Kidney Failure <15      Lipid Panel [[id number]]  (Abnormal) Collected: 08/06/22 0430    Specimen: Blood Updated: 08/06/22 0509     Total Cholesterol 187 mg/dL      Triglycerides 158 mg/dL      HDL Cholesterol 41 mg/dL      LDL Cholesterol  118 mg/dL      VLDL Cholesterol 28 mg/dL      LDL/HDL Ratio 2.79    Narrative:      Cholesterol Reference Ranges  (U.S. Department of Health and  Human Services ATP III Classifications)    Desirable          <200 mg/dL  Borderline High    200-239 mg/dL  High Risk          >240 mg/dL      Triglyceride Reference Ranges  (U.S. Department of Health and Human Services ATP III Classifications)    Normal           <150 mg/dL  Borderline High  150-199 mg/dL  High             200-499 mg/dL  Very High        >500 mg/dL    HDL Reference Ranges  (U.S. Department of Health and Human Services ATP III Classifications)    Low     <40 mg/dl (major risk factor for CHD)  High    >60 mg/dl ('negative' risk factor for CHD)        LDL Reference Ranges  (U.S. Department of Health and Human Services ATP III Classifications)    Optimal          <100 mg/dL  Near Optimal     100-129 mg/dL  Borderline High  130-159 mg/dL  High             160-189 mg/dL  Very High        >189 mg/dL    Phosphorus [[id number]]  (Normal) Collected: 08/06/22 0430    Specimen: Blood Updated: 08/06/22 0509     Phosphorus 3.3 mg/dL     Uric Acid [[id number]]  (Abnormal) Collected: 08/06/22 0430    Specimen: Blood Updated: 08/06/22 0509     Uric Acid 9.4 mg/dL     Magnesium [[id number]]  (Normal) Collected: 08/06/22 0430    Specimen: Blood Updated: 08/06/22 0509     Magnesium 1.7 mg/dL     Hemoglobin A1c [[id number]]  (Abnormal) Collected: 08/06/22 0430    Specimen: Blood Updated: 08/06/22 0509     Hemoglobin A1C 6.80 %     Narrative:      Hemoglobin A1C Ranges:    Increased Risk for Diabetes  5.7% to 6.4%  Diabetes                     >= 6.5%  Diabetic Goal                < 7.0%    CBC (No Diff) [[id number]]  (Normal) Collected: 08/06/22 0430    Specimen: Blood Updated: 08/06/22 0446     WBC 8.96 10*3/mm3      RBC 4.45 10*6/mm3      Hemoglobin 13.7 g/dL      Hematocrit 41.6 %      MCV 93.5 fL      MCH 30.8 pg      MCHC 32.9 g/dL      RDW 12.9 %      RDW-SD 43.9 fl      MPV 10.2 fL      Platelets 279 10*3/mm3     Osmolality, Urine - Urine, Clean Catch [[id number]]  (Normal) Collected: 08/05/22 1956    Specimen: Urine,  Clean Catch Updated: 08/05/22 2041     Osmolality, Urine 413 mOsm/kg     Sodium, Urine, Random - Urine, Clean Catch [[id number]] Collected: 08/05/22 1956    Specimen: Urine, Clean Catch Updated: 08/05/22 2040     Sodium, Urine 96 mmol/L     Narrative:      Reference intervals for random urine have not been established.  Clinical usage is dependent upon physician's interpretation in combination with other laboratory tests.       Protein, Urine, Random - Urine, Clean Catch [[id number]] Collected: 08/05/22 1956    Specimen: Urine, Clean Catch Updated: 08/05/22 2040     Total Protein, Urine 9.0 mg/dL     Narrative:      Reference intervals for random urine have not been established.  Clinical usage is dependent upon physician's interpretation in combination with other laboratory tests.       Urinalysis With Microscopic If Indicated (No Culture) - Urine, Clean Catch [[id number]]  (Normal) Collected: 08/05/22 1956    Specimen: Urine, Clean Catch Updated: 08/05/22 2022     Color, UA Yellow     Appearance, UA Clear     pH, UA 6.0     Specific Gravity, UA 1.018     Glucose, UA Negative     Ketones, UA Negative     Bilirubin, UA Negative     Blood, UA Negative     Protein, UA Negative     Leuk Esterase, UA Negative     Nitrite, UA Negative     Urobilinogen, UA 0.2 E.U./dL    Narrative:      Urine microscopic not indicated.    PTH, Intact [[id number]]  (Abnormal) Collected: 08/05/22 1948    Specimen: Blood Updated: 08/05/22 2017     PTH, Intact 104.6 pg/mL     Narrative:      Results may be falsely decreased if patient taking Biotin.      COVID PRE-OP / PRE-PROCEDURE SCREENING ORDER (NO ISOLATION) - Swab, Nasal Cavity [[id number]]  (Normal) Collected: 08/05/22 1627    Specimen: Swab from Nasal Cavity Updated: 08/05/22 1745    Narrative:      The following orders were created for panel order COVID PRE-OP / PRE-PROCEDURE SCREENING ORDER (NO ISOLATION) - Swab, Nasal Cavity.  Procedure                               Abnormality          Status                     ---------                               -----------         ------                     COVID-19,[facility]...[[id number]]  Normal              Final result                 Please view results for these tests on the individual orders.    COVID-19,[facility] [employer] IN-HOUSE,Nasal Swab No Transport Media 3-4 HR TAT - Swab, Nasal Cavity [[id number]]  (Normal) Collected: 08/05/22 1627    Specimen: Swab from Nasal Cavity Updated: 08/05/22 1745     COVID19 Not Detected    Narrative:      Fact sheet for providers: https://www.fda.gov/media/973518/download     Fact sheet for patients: https://www.fda.gov/media/345441/download    Test performed by PCR.    Consider negative results in combination with clinical observations, patient history, and epidemiological information.          Radiology:   Imaging Results (Last 24 Hours)     Procedure Component Value Units Date/Time    CT Chest Without Contrast Diagnostic [[id number]] Collected: 08/06/22 1139     Updated: 08/06/22 1149    Narrative:      EXAMINATION: CT CHEST WO CONTRAST DIAGNOSTIC- 8/6/2022 11:39 AM CDT     HISTORY: Chest pain, nonspecific     DOSE: 294 mGycm (Automatic exposure control technique was implemented in  an effort to keep the radiation dose as low as possible without  compromising image quality)     REPORT: Spiral CT of the chest was performed without intravenous  contrast from the thoracic inlet through the upper abdomen.  Reconstructed coronal and sagittal images are also reviewed.     Comparison: There are no correlative imaging studies for comparison.     Review of lung windows demonstrates hyperinflation of the lungs, with no  visible bullous changes. There is no evidence of pneumonia. No lung mass  or suspicious pulmonary nodule is identified. There is abundant  subpleural fat bilaterally and slightly greater on the right. No  pathologic pleural thickening is identified. There is subtle subpleural  parenchymal opacities in the upper  lung zones which may represent mild  atelectasis or scarring. No pleural effusion is identified. Soft tissue  windows demonstrate a normal appearance of the thyroid gland. No  intrathoracic lymphadenopathy is identified. A small amount calcified  plaque is noted at the aortic arch, with normal aortic caliber. There is  heavy calcified plaque within the LAD coronary artery distribution and  small amount within the other coronary arteries. Heart size is normal.  The pulmonary arteries are normal caliber. In the upper abdomen,  moderate volume of the foot stuff is noted in the stomach. The  gallbladder appears partially contracted. The kidneys are atrophic, with  lobular contours. The appearance suggests chronic renal disease. There  is a small cyst at the upper pole the right kidney that measures  maximally 18 mm. Review of bone windows shows no acute abnormality.       Impression:      1. No acute intrathoracic abnormality. Hyperinflation of the lungs  compatible with mild COPD. No pulmonary infiltrate is identified. There  is no pneumothorax or pleural effusion.  2. Diffuse coronary artery calcified plaque, greatest at the LAD  distribution. Normal caliber of the thoracic aorta and pulmonary  arteries.  3. Atrophy of the kidneys and probable chronic renal disease. Clinical  correlation is recommended. 18 mm cyst at the upper pole of the right  kidney.              This report was finalized on 08/06/2022 11:46 by Dr. Jimbo Lopez MD.    US Renal Bilateral [[id number]] Collected: 08/06/22 0947     Updated: 08/06/22 0952    Narrative:      EXAMINATION: US RENAL BILATERAL- 8/6/2022 9:47 AM CDT     HISTORY: elevated BUN/Cr; R07.9-Chest pain, unspecified     REPORT: Sonographic images of the kidneys were obtained.     COMPARISON: There are no correlative imaging studies for comparison.     The right kidney measures 10.9 x 5.5 x 5.0 cm and shows diffuse cortical  thinning and mildly increased cortical echogenicity. No  "hydronephrosis  or solid mass is identified. There is a benign-appearing cyst at the  upper pole that measures 2.1 x 1.5 x 1.6 cm. Color Doppler images  demonstrate vascular flow within the right kidney.     The bladder is not well distended but appears within normal limits. The  prostate gland is enlarged and measures 5.6 x 5.9 x 5.5 cm. Clinical  correlation is recommended.     The left kidney measures 10.3 x 5.4 x 4.5 cm and shows diffusely  increased cortical echogenicity and mild cortical thinning. No mass or  hydronephrosis is identified. Color Doppler images demonstrate vascular  flow within the left kidney.       Impression:      1. Abnormally increased cortical echogenicity of both kidneys, which may  be related to chronic \"medical\" renal disease. There is also diffuse  cortical thinning, greater on the right. This causes a somewhat lobular  cortical contour. No renal mass or hydronephrosis is identified.  2. Prostatomegaly, probable BPH. Clinical correlation is recommended.  This report was finalized on 08/06/2022 09:49 by Dr. Jimbo Lopez MD.              Assessment   -Chronic kidney disease stage IIIb  -Hypertensive nephrosclerosis  -Coronary artery disease  -History of kidney stones  -Secondary hyperparathyroidism  -Hyperuricemia    Plan:  Renal ultrasound was reviewed.  Continue hydration to decrease risk of contrast-induced nephropathy.  Patient is still awaiting final decision about his upcoming heart surgery.  Follow-up labs.  We will add allopurinol and oral calcitriol.      Fermín Rincon MD  8/6/2022  17:11 CDT  "

## 2022-08-07 ENCOUNTER — APPOINTMENT (OUTPATIENT)
Dept: ULTRASOUND IMAGING | Facility: HOSPITAL | Age: 77
End: 2022-08-07

## 2022-08-07 ENCOUNTER — READMISSION MANAGEMENT (OUTPATIENT)
Dept: CALL CENTER | Facility: HOSPITAL | Age: 77
End: 2022-08-07

## 2022-08-07 VITALS
DIASTOLIC BLOOD PRESSURE: 59 MMHG | SYSTOLIC BLOOD PRESSURE: 125 MMHG | RESPIRATION RATE: 18 BRPM | BODY MASS INDEX: 29.14 KG/M2 | HEIGHT: 68 IN | HEART RATE: 67 BPM | OXYGEN SATURATION: 100 % | WEIGHT: 192.25 LBS | TEMPERATURE: 97.5 F

## 2022-08-07 LAB
ANION GAP SERPL CALCULATED.3IONS-SCNC: 8 MMOL/L (ref 5–15)
BUN SERPL-MCNC: 37 MG/DL (ref 8–23)
BUN/CREAT SERPL: 19.2 (ref 7–25)
CALCIUM SPEC-SCNC: 9 MG/DL (ref 8.6–10.5)
CHLORIDE SERPL-SCNC: 106 MMOL/L (ref 98–107)
CO2 SERPL-SCNC: 24 MMOL/L (ref 22–29)
CREAT SERPL-MCNC: 1.93 MG/DL (ref 0.76–1.27)
EGFRCR SERPLBLD CKD-EPI 2021: 35.7 ML/MIN/1.73
GLUCOSE SERPL-MCNC: 136 MG/DL (ref 65–99)
POTASSIUM SERPL-SCNC: 4 MMOL/L (ref 3.5–5.2)
SODIUM SERPL-SCNC: 138 MMOL/L (ref 136–145)

## 2022-08-07 PROCEDURE — 93880 EXTRACRANIAL BILAT STUDY: CPT

## 2022-08-07 PROCEDURE — 63710000001 AMLODIPINE 5 MG TABLET: Performed by: INTERNAL MEDICINE

## 2022-08-07 PROCEDURE — A9270 NON-COVERED ITEM OR SERVICE: HCPCS | Performed by: INTERNAL MEDICINE

## 2022-08-07 PROCEDURE — 93880 EXTRACRANIAL BILAT STUDY: CPT | Performed by: SURGERY

## 2022-08-07 PROCEDURE — G0378 HOSPITAL OBSERVATION PER HR: HCPCS

## 2022-08-07 PROCEDURE — 63710000001 PANTOPRAZOLE 40 MG TABLET DELAYED-RELEASE: Performed by: INTERNAL MEDICINE

## 2022-08-07 PROCEDURE — 80048 BASIC METABOLIC PNL TOTAL CA: CPT | Performed by: INTERNAL MEDICINE

## 2022-08-07 PROCEDURE — 63710000001 CALCITRIOL 0.25 MCG CAPSULE: Performed by: INTERNAL MEDICINE

## 2022-08-07 PROCEDURE — 93970 EXTREMITY STUDY: CPT | Performed by: SURGERY

## 2022-08-07 PROCEDURE — 63710000001 ATENOLOL 25 MG TABLET: Performed by: INTERNAL MEDICINE

## 2022-08-07 PROCEDURE — 63710000001 ASPIRIN 81 MG TABLET DELAYED-RELEASE: Performed by: INTERNAL MEDICINE

## 2022-08-07 PROCEDURE — 63710000001 ALLOPURINOL 100 MG TABLET: Performed by: INTERNAL MEDICINE

## 2022-08-07 PROCEDURE — 99217 PR OBSERVATION CARE DISCHARGE MANAGEMENT: CPT | Performed by: INTERNAL MEDICINE

## 2022-08-07 PROCEDURE — 63710000001 ISOSORBIDE MONONITRATE 30 MG TABLET SUSTAINED-RELEASE 24 HOUR: Performed by: INTERNAL MEDICINE

## 2022-08-07 PROCEDURE — 93970 EXTREMITY STUDY: CPT

## 2022-08-07 RX ORDER — PANTOPRAZOLE SODIUM 40 MG/1
40 TABLET, DELAYED RELEASE ORAL
Qty: 30 TABLET | Refills: 3 | Status: SHIPPED | OUTPATIENT
Start: 2022-08-08 | End: 2022-12-16 | Stop reason: SDUPTHER

## 2022-08-07 RX ORDER — ATORVASTATIN CALCIUM 40 MG/1
40 TABLET, FILM COATED ORAL NIGHTLY
Qty: 90 TABLET | Refills: 3 | Status: SHIPPED | OUTPATIENT
Start: 2022-08-07 | End: 2022-11-08 | Stop reason: SDUPTHER

## 2022-08-07 RX ORDER — ALLOPURINOL 100 MG/1
200 TABLET ORAL DAILY
Qty: 30 TABLET | Refills: 1 | Status: SHIPPED | OUTPATIENT
Start: 2022-08-08 | End: 2022-09-20 | Stop reason: SDUPTHER

## 2022-08-07 RX ORDER — CALCITRIOL 0.25 UG/1
0.25 CAPSULE, LIQUID FILLED ORAL DAILY
Qty: 30 CAPSULE | Refills: 3 | Status: SHIPPED | OUTPATIENT
Start: 2022-08-08 | End: 2022-12-05 | Stop reason: SDUPTHER

## 2022-08-07 RX ADMIN — CALCITRIOL 0.25 MCG: 0.25 CAPSULE ORAL at 08:58

## 2022-08-07 RX ADMIN — ASPIRIN 81 MG: 81 TABLET, COATED ORAL at 08:58

## 2022-08-07 RX ADMIN — ALLOPURINOL 200 MG: 100 TABLET ORAL at 08:58

## 2022-08-07 RX ADMIN — AMLODIPINE BESYLATE 5 MG: 5 TABLET ORAL at 08:59

## 2022-08-07 RX ADMIN — ISOSORBIDE MONONITRATE 30 MG: 30 TABLET, EXTENDED RELEASE ORAL at 08:59

## 2022-08-07 RX ADMIN — PANTOPRAZOLE SODIUM 40 MG: 40 TABLET, DELAYED RELEASE ORAL at 06:26

## 2022-08-07 RX ADMIN — ATENOLOL 25 MG: 25 TABLET ORAL at 08:59

## 2022-08-07 NOTE — DISCHARGE SUMMARY
[facility], [facility] Discharge Summary    Date of Discharge:  8/7/2022    Discharge Diagnosis:     Coronary artery disease  Essential hypertension  Hyperlipidemia  Chronic kidney disease stage IIIb      Presenting Problem/History of Present Illness  Patient was seen by Dr. Cash in clinic for chest pain.  Stress testing was performed and revealed large anterior infarction without any significant underlying ischemia.  He had akinesis of proximal to mid LAD distribution.  He had ongoing exertional chest pain and chest pain at rest.    Hospital Course  Patient presented for elective cardiac catheterization with Dr. Cash.   Mid LAD was subtotally occluded at the bifurcation site.  There was moderate atherosclerotic changes with some aneurysmal dilation of the proximal RCA, midportion with approximately a 40% stenosis.  He also had subtotally occluded proximal right posterior lateral artery.  The patient tolerated the procedure well. Nephrology was consulted for elevated creatinine.  Renal ultrasound was obtained, and patient was started on allopurinol and oral calcitriol.  Cardiothoracic surgery was consulted for possible CABG.  Preoperative work up has now been completed.  Creatinine has improved.  Patient will get cardiac MRI for viability as an outpatient.  Further recommendations will be made from CTS at that time regarding CABG.  Patient is stable for discharge.  He denies any chest pain.  Groin site is without drainage or hematoma.  He will follow up with cardiology in one week. Consider starting other GDMT's for heart failure as renal function and blood pressure will allow.       Procedures Performed  Procedure(s):  Cardiac Catheterization/Vascular Study       Consults:   Consults     Date and Time Order Name Status Description    8/5/2022  6:00 PM Inpatient Cardiothoracic Surgery Consult Completed     8/5/2022  4:33 PM Inpatient Nephrology Consult Completed           Physical  Exam at Discharge    Vital Signs  Temp:  [97.5 °F (36.4 °C)-98.3 °F (36.8 °C)] 97.5 °F (36.4 °C)  Heart Rate:  [58-80] 67  Resp:  [16-18] 18  BP: (113-138)/(55-71) 125/59    Physical Exam:  General Appearance:    Alert, cooperative, in no acute distress   Head:    Normocephalic, without obvious abnormality, atraumatic   Eyes:            Lids and lashes normal, conjunctivae and sclerae normal, no   icterus, PERRLA, EOMI   Throat:   Oral mucosa pink and moist   Neck:   No adenopathy, supple, trachea midline, no thyromegaly, no   carotid bruit, no JVD   Lungs:     Clear to auscultation bilaterally,respirations regular, even     and unlabored    Heart:    Regular rhythm and normal rate, normal S1 and S2, no            murmur, no gallop, no rub, no click   Chest Wall:    No abnormalities observed   Abdomen:     Normal bowel sounds present in all four quadrants, no       masses, no organomegaly, soft non-tender, non-distended    Extremities:   No edema, no cyanosis, no clubbing, no hematoma or drainage at groin site   Pulses:   Pulses palpable and equal bilaterally   Skin:   No bleeding, bruising or rash   Psychiatric:   Displays appropriate mood and affect       Discharge Disposition  Home or Self Care    Discharge Medications     Discharge Medications      New Medications      Instructions Start Date   allopurinol 100 MG tablet  Commonly known as: ZYLOPRIM   200 mg, Oral, Daily   Start Date: August 8, 2022     atorvastatin 40 MG tablet  Commonly known as: LIPITOR   40 mg, Oral, Nightly      calcitriol 0.25 MCG capsule  Commonly known as: ROCALTROL   0.25 mcg, Oral, Daily   Start Date: August 8, 2022        Changes to Medications      Instructions Start Date   pantoprazole 40 MG EC tablet  Commonly known as: PROTONIX  What changed: when to take this   40 mg, Oral, Every Early Morning   Start Date: August 8, 2022        Continue These Medications      Instructions Start Date   amLODIPine 5 MG tablet  Commonly known as:  NORVASC   5 mg, Oral, Daily      aspirin 81 MG tablet   81 mg, Oral, Daily      atenolol 25 MG tablet  Commonly known as: TENORMIN   25 mg, Oral, Daily      clopidogrel 75 MG tablet  Commonly known as: PLAVIX   75 mg, Oral, Daily      isosorbide mononitrate 30 MG 24 hr tablet  Commonly known as: IMDUR   30 mg, Oral, Daily      nitroglycerin 0.4 MG SL tablet  Commonly known as: NITROSTAT   1 under the tongue as needed for angina, may repeat q5mins for up three doses         Stop These Medications    triamterene-hydrochlorothiazide 37.5-25 MG per tablet  Commonly known as: MAXZIDE-25            Discharge Diet:   Diet Instructions     Diet: Cardiac      Discharge Diet: Cardiac          Activity at Discharge:   Activity Instructions     Other Activity Instructions      No heavy lifting for 5-7 days greater than 10 lbs.  No heavy or strenuous pushing or pulling.  No tub baths, hot tubs, swimming pools, or submerging groin for 1 week.  Wash groin site daily with antibacterial soap and water.  Rinse and keep clean and dry.  No lotions, powders, or topical ointments to site.  Keep open to air and dry.  Call our office with any concerns or if you notice redness, swelling, drainage, warmth, or pain at the site.          Follow-up Appointments  Future Appointments   Date Time Provider Department [place]   8/24/2022 11:00 AM Toya Ramey APRN [facility] CD  [facility]     Additional Instructions for the Follow-ups that You Need to Schedule     Discharge Follow-up with PCP   As directed       Currently Documented PCP:    Carol Mccallum APRN    PCP Phone Number:    [phone]     Follow Up Details: 1 week         Discharge Follow-up with Specified Provider: CAROL Urbina; 1 Week   As directed      To: CAROL Urbina    Follow Up: 1 Week                  Devika Carreon PA-C  08/07/22  12:14 CDT

## 2022-08-07 NOTE — PLAN OF CARE
Goal Outcome Evaluation:  Plan of Care Reviewed With: patient        Progress: improving  Outcome Evaluation: VSS/RA.  HR SB/S/SA 56-79 with a drop to 47 and BBB per tele.  Pt did not c/o pain this shift.  R groin cath site MADAN, soft, nontender.  Pt appeared to sleep most of the night.  Will continue to monitor and notify MD of any changes.

## 2022-08-08 ENCOUNTER — TELEPHONE (OUTPATIENT)
Dept: CARDIOLOGY | Facility: CLINIC | Age: 77
End: 2022-08-08

## 2022-08-08 NOTE — TELEPHONE ENCOUNTER
Caller: Mitzy Philip    Relationship: Emergency Contact    Best call back number: [phone]    What medications are you currently taking:   Current Outpatient Medications on File Prior to Visit   Medication Sig Dispense Refill   • allopurinol (ZYLOPRIM) 100 MG tablet Take 2 tablets by mouth Daily. 30 tablet 1   • amLODIPine (NORVASC) 5 MG tablet Take 1 tablet by mouth Daily. 30 tablet 11   • aspirin 81 MG tablet Take 81 mg by mouth Daily.     • atenolol (TENORMIN) 25 MG tablet Take 1 tablet by mouth Daily. 30 tablet 11   • atorvastatin (LIPITOR) 40 MG tablet Take 1 tablet by mouth Every Night. 90 tablet 3   • calcitriol (ROCALTROL) 0.25 MCG capsule Take 1 capsule by mouth Daily. 30 capsule 3   • clopidogrel (PLAVIX) 75 MG tablet Take 75 mg by mouth Daily.     • isosorbide mononitrate (IMDUR) 30 MG 24 hr tablet Take 1 tablet by mouth Daily. 30 tablet 11   • nitroglycerin (NITROSTAT) 0.4 MG SL tablet 1 under the tongue as needed for angina, may repeat q5mins for up three doses 25 tablet 3   • pantoprazole (PROTONIX) 40 MG EC tablet Take 1 tablet by mouth Every Morning. 30 tablet 3     Current Facility-Administered Medications on File Prior to Visit   Medication Dose Route Frequency Provider Last Rate Last Admin   • [DISCONTINUED] acetaminophen (TYLENOL) tablet 650 mg  650 mg Oral Q4H PRN Hero Cash MD       • [DISCONTINUED] allopurinol (ZYLOPRIM) tablet 200 mg  200 mg Oral Daily Fermín Rincon MD   200 mg at 08/07/22 0858   • [DISCONTINUED] ALPRAZolam (XANAX) tablet 0.5 mg  0.5 mg Oral TID PRN Hero Cash MD       • [DISCONTINUED] amLODIPine (NORVASC) tablet 5 mg  5 mg Oral Daily Hero Cash MD   5 mg at 08/07/22 0859   • [DISCONTINUED] aspirin EC tablet 81 mg  81 mg Oral Daily Hero Cash MD   81 mg at 08/07/22 0858   • [DISCONTINUED] atenolol (TENORMIN) tablet 25 mg  25 mg Oral Daily Hero Cash MD   25 mg at 08/07/22 0859   • [DISCONTINUED] atorvastatin (LIPITOR) tablet 40 mg  40 mg Oral  Devika Hanson PA-C   40 mg at 08/06/22 2006   • [DISCONTINUED] calcitriol (ROCALTROL) capsule 0.25 mcg  0.25 mcg Oral Daily Fermín Rincon MD   0.25 mcg at 08/07/22 0858   • [DISCONTINUED] calcium carbonate (TUMS) chewable tablet 500 mg (200 mg elemental)  2 tablet Oral BID PRN Hero Torre MD       • [DISCONTINUED] clopidogrel (PLAVIX) tablet 75 mg  75 mg Oral Daily Hero Torre MD   75 mg at 08/06/22 1710   • [DISCONTINUED] diphenhydrAMINE (BENADRYL) capsule 25 mg  25 mg Oral Q6H PRN Hero Torre MD       • [DISCONTINUED] isosorbide mononitrate (IMDUR) 24 hr tablet 30 mg  30 mg Oral Daily Hero Torre MD   30 mg at 08/07/22 0859   • [DISCONTINUED] nitroglycerin (NITROSTAT) SL tablet 0.4 mg  0.4 mg Sublingual Q5 Min PRN Hero Torre MD       • [DISCONTINUED] ondansetron (ZOFRAN) injection 4 mg  4 mg Intravenous Q6H PRN Hero Torre MD       • [DISCONTINUED] ondansetron (ZOFRAN) tablet 4 mg  4 mg Oral Q6H PRN Hero Torre MD       • [DISCONTINUED] pantoprazole (PROTONIX) EC tablet 40 mg  40 mg Oral Q AM Hero Torre MD   40 mg at 08/07/22 0626   • [DISCONTINUED] sodium chloride 0.9 % infusion  50 mL/hr Intravenous Continuous Hero Torre MD 50 mL/hr at 08/06/22 2002 50 mL/hr at 08/06/22 2002   • [DISCONTINUED] temazepam (RESTORIL) capsule 7.5 mg  7.5 mg Oral Nightly PRN Hero Torre MD              When did you start taking these medications: WHILE IN HOSPITAL   Which medication are you concerned about: THE ONE FOR KIDNEYS    Who prescribed you this medication: DEVIKA KERNS    What are your concerns: PT IS HAVING A GOUT ATTACK AND MEDICATION SHOWS ITS A SYMPTOM AND PT MIGHT NEED DIFFERENT OR ANOTHER MEDICATION TO HELP WITH THE GOUT - PT HAS ONLY TAKEN MED IN THE HOSPITAL - THE MEDICATION IS FILLED BUT PT HASNT TAKEN A DOSE TODAY UNTIL HE HEARS FROM DR TORRE    How long have you had these concerns: TWO DAYS

## 2022-08-08 NOTE — OUTREACH NOTE
Prep Survey    Flowsheet Row Responses   [facility] facility patient discharged from? [place]   Is LACE score < 7 ? Yes   Emergency Room discharge w/ pulse ox? No   Eligibility Readm Mgmt   Discharge diagnosis Coronary artery disease   Does the patient have one of the following disease processes/diagnoses(primary or secondary)? Other   Does the patient have Home health ordered? No   Is there a DME ordered? No   Prep survey completed? Yes          JACKELINE VARGAS - Registered Nurse

## 2022-08-08 NOTE — TELEPHONE ENCOUNTER
Call placed to spouse for clarification of medications at [place] 08/07/2022.Patient not at home. Wife is to pick Pantoprazole today, all other medications have been picked up and taken accordingly.

## 2022-08-09 DIAGNOSIS — I65.29 STENOSIS OF CAROTID ARTERY, UNSPECIFIED LATERALITY: Primary | ICD-10-CM

## 2022-08-16 ENCOUNTER — TELEPHONE (OUTPATIENT)
Dept: CARDIOLOGY | Facility: CLINIC | Age: 77
End: 2022-08-16

## 2022-08-16 NOTE — TELEPHONE ENCOUNTER
DAVID WILLIS CALLED AGAIN AND WANTS ADVICE ON IF PT CAN TAKE CHOLORINE AGAIN FOR GOUT. WOULD LIKE FURTHER ADVISEMENT. SHE IS CURRENTLY AT WORK, BUT WOULD LIKE A CALL BACK.

## 2022-08-16 NOTE — TELEPHONE ENCOUNTER
RN called and spoke with spouse who states patient still has gout ankle/toes. allopurinol x15 days- completed course, not better getting worse.Spouse is icing and keep foot elevated. Today reports patient cannot get shoe on. Also requesting Cardiac MRI in [place] to be ordered and scheduled. Please advise

## 2022-08-16 NOTE — TELEPHONE ENCOUNTER
Caller: LazaroDavid    Relationship: Emergency Contact    Best call back number: [phone]    What is the best time to reach you: ASAP      Who are you requesting to speak with (clinical staff, provider,  specific staff member): CLINICAL STAFF    Do you know the name of the person who called: DAVID LAZARO    What was the call regarding: PT'S WIFE CALLED IN STATING THAT PT HAS ACTIVE SYMPTOMS OF WHAT SHE BELIEVES IS GOUT. SAID HE WAS PRESCRIBED NEW MEDICATION WEEKEND OF AUGUST 06. PT HAS BEEN ELEVATING AND ICING FOR OVER A WEEK AND HAS NOT RECEIVED ANY RELIEF. SWELLING IS IN ANKLE AND TOES. ALSO WANTS TO CHECK IN ON CARDIAC MRI THAT DR. TORRE MENTIONED SETTING UP IN [place]. WOULD LIKE ADVISEMENT ON MEDICATIONS AND POTENTIAL MEDICATION THAT HE USED TO TAKE FOR GOUT. WONDERING IF IT IS OKAY TO TAKE THIS MEDICATION AGAIN AND IS CONCERNED THAT IT MAY BE BAD FOR HIS KIDNEYS. PLEASE ADVISE.    Do you require a callback: YES

## 2022-08-17 ENCOUNTER — TELEPHONE (OUTPATIENT)
Dept: CARDIAC SURGERY | Facility: CLINIC | Age: 77
End: 2022-08-17

## 2022-08-17 NOTE — TELEPHONE ENCOUNTER
Caller: Mitzy Philip     Relationship: [unfilled]     Best call back number: [phone]    What is your medical concern?WOULD LIKE A CALL TO DISCUSS MEDICATION FOR GOUT AND THE SWELLING OF HIS FOOT.     How long has this issue been going on?   3 DAYS     Have you been treated for this issue?   HOSPITAL AROUND 2 WEEKS AGO

## 2022-08-19 ENCOUNTER — APPOINTMENT (OUTPATIENT)
Dept: CT IMAGING | Facility: HOSPITAL | Age: 77
End: 2022-08-19

## 2022-08-23 PROBLEM — E78.5 HYPERLIPIDEMIA LDL GOAL <70: Status: ACTIVE | Noted: 2022-08-23

## 2022-08-23 PROBLEM — I25.10 CORONARY ARTERY DISEASE INVOLVING NATIVE CORONARY ARTERY OF NATIVE HEART WITHOUT ANGINA PECTORIS: Status: ACTIVE | Noted: 2022-06-24

## 2022-08-23 PROBLEM — I50.42 CHRONIC COMBINED SYSTOLIC AND DIASTOLIC HEART FAILURE: Status: ACTIVE | Noted: 2022-08-05

## 2022-08-23 PROBLEM — N18.32 STAGE 3B CHRONIC KIDNEY DISEASE: Status: ACTIVE | Noted: 2022-08-23

## 2022-08-23 NOTE — PROGRESS NOTES
Chief Complaint  Chest Pain (S/P cath) and Congestive Heart Failure    Subjective          Gallo Philip presents to [facility] for routine follow-up of hospital discharge on 8/7/2022 following elective left heart catheterization on 8/5/2022 which revealed subtotally occluded mid LAD, 50% stenosis of the ostial ramus, 40% stenosis of the mid RCA, subtotally occluded PDL with collaterals noted from the right coronary artery reconstituting in the distal and apical LAD.  He was referred to CT surgery for evaluation for potential CABG.  He has chronic combined systolic and diastolic congestive heart failure, coronary artery disease, hypertension, hyperlipidemia, stage III chronic kidney disease, cerebrovascular accident, thoracic outlet syndrome and former obesity. He reports intermittent bilateral lower extremity edema. Patient denies chest pain, shortness of breath, palpitations, dizziness, syncope, orthopnea, PND or decreased stamina.  Patient denies any signs of bleeding.    Congestive Heart Failure  Presents for follow-up visit. Associated symptoms include edema. Pertinent negatives include no abdominal pain, chest pain, chest pressure, claudication, fatigue, muscle weakness, near-syncope, nocturia, orthopnea, palpitations, paroxysmal nocturnal dyspnea, shortness of breath or unexpected weight change. The symptoms have been stable. His past medical history is significant for CAD. Compliance with total regimen is 51-75%. Compliance with diet is 51-75%. Compliance with exercise is 51-75%. Compliance with medications is %.   Coronary Artery Disease  Presents for follow-up visit. Pertinent negatives include no chest pain, chest pressure, chest tightness, dizziness, leg swelling, muscle weakness, palpitations, shortness of breath or weight gain. Risk factors include hyperlipidemia. Risk factors do not include obesity. His past medical history is significant for CHF. The symptoms have  been stable. Compliance with diet is variable. Compliance with exercise is variable. Compliance with medications is good.   Hypertension  This is a chronic problem. The current episode started more than 1 year ago. The problem is controlled. Associated symptoms include peripheral edema. Pertinent negatives include no anxiety, blurred vision, chest pain, headaches, malaise/fatigue, neck pain, orthopnea, palpitations, PND, shortness of breath or sweats. Risk factors for coronary artery disease include male gender, dyslipidemia and obesity. Current antihypertension treatment includes calcium channel blockers, beta blockers and direct vasodilators. The current treatment provides significant improvement. Hypertensive end-organ damage includes kidney disease, CAD/MI, CVA and heart failure. Identifiable causes of hypertension include chronic renal disease.   Hyperlipidemia  This is a chronic problem. The current episode started more than 1 year ago. Exacerbating diseases include chronic renal disease. He has no history of obesity. Pertinent negatives include no chest pain or shortness of breath. Current antihyperlipidemic treatment includes statins. Risk factors for coronary artery disease include male sex, obesity, hypertension and dyslipidemia.       Objective     Current Outpatient Medications:   •  allopurinol (ZYLOPRIM) 100 MG tablet, Take 2 tablets by mouth Daily., Disp: 30 tablet, Rfl: 1  •  aspirin 81 MG tablet, Take 81 mg by mouth Daily., Disp: , Rfl:   •  atorvastatin (LIPITOR) 40 MG tablet, Take 1 tablet by mouth Every Night., Disp: 90 tablet, Rfl: 3  •  calcitriol (ROCALTROL) 0.25 MCG capsule, Take 1 capsule by mouth Daily., Disp: 30 capsule, Rfl: 3  •  clopidogrel (PLAVIX) 75 MG tablet, Take 75 mg by mouth Daily., Disp: , Rfl:   •  isosorbide mononitrate (IMDUR) 30 MG 24 hr tablet, Take 1 tablet by mouth Daily., Disp: 30 tablet, Rfl: 11  •  nitroglycerin (NITROSTAT) 0.4 MG SL tablet, 1 under the tongue as  "needed for angina, may repeat q5mins for up three doses, Disp: 25 tablet, Rfl: 3  •  pantoprazole (PROTONIX) 40 MG EC tablet, Take 1 tablet by mouth Every Morning., Disp: 30 tablet, Rfl: 3  •  metoprolol succinate XL (TOPROL-XL) 25 MG 24 hr tablet, Take 1 tablet by mouth Daily., Disp: 90 tablet, Rfl: 3  •  sacubitril-valsartan (Entresto) 24-26 MG tablet, Take 1 tablet by mouth 2 (Two) Times a Day., Disp: 60 tablet, Rfl: 11  Vital Signs:   /72   Pulse 54   Ht 172.7 cm (68\")   Wt 89.8 kg (198 lb)   SpO2 95%   BMI 30.11 kg/m²     Vitals and nursing note reviewed.   Constitutional:       General: Awake.      Appearance: Normal and healthy appearance. Well-developed and not in distress. Obese.   Eyes:      General: Lids are normal.      Conjunctiva/sclera: Conjunctivae normal.      Pupils: Pupils are equal, round, and reactive to light.   HENT:      Head: Normocephalic and atraumatic.      Nose: Nose normal.   Neck:      Vascular: No JVR. JVD normal.   Pulmonary:      Effort: Pulmonary effort is normal.      Breath sounds: Normal breath sounds. No wheezing. No rhonchi. No rales.   Chest:      Chest wall: Not tender to palpatation.   Cardiovascular:      PMI at left midclavicular line. Normal rate. Regular rhythm. Normal S1. Normal S2.      Murmurs: There is no murmur.      No gallop. No click. No rub.   Pulses:     Intact distal pulses.   Edema:     Peripheral edema absent.   Abdominal:      General: Bowel sounds are normal.      Palpations: Abdomen is soft.      Tenderness: There is no abdominal tenderness.   Musculoskeletal: Normal range of motion.         General: No tenderness.      Cervical back: Normal range of motion. Skin:     General: Skin is warm and dry.   Neurological:      General: No focal deficit present.      Mental Status: Alert, oriented to person, place, and time and oriented to person, place and time.   Psychiatric:         Attention and Perception: Attention and perception normal.         " Mood and Affect: Mood and affect normal.         Speech: Speech normal.         Behavior: Behavior normal. Behavior is cooperative.         Thought Content: Thought content normal.         Cognition and Memory: Cognition and memory normal.         Judgment: Judgment normal.        Result Review :   The following data was reviewed by: CAROL Perez on 08/24/2022:  Common labs    Common Labsle 8/5/22 8/5/22 8/6/22 8/6/22 8/6/22 8/6/22 8/6/22 8/7/22    1410 1410 0430 0430 0430 0430 0430    Glucose  135 (A)    122 (A)  136 (A)   BUN  35 (A)    37 (A)  37 (A)   Creatinine  1.89 (A)    2.12 (A)  1.93 (A)   Sodium  135 (A)    138  138   Potassium  3.8    4.2  4.0   Chloride  100    105  106   Calcium  9.5    9.0  9.0   Albumin  3.80         Total Bilirubin  0.5         Alkaline Phosphatase  83         AST (SGOT)  20         ALT (SGPT)  20         WBC 12.10 (A)  8.96        Hemoglobin 14.6  13.7        Hematocrit 44.4  41.6        Platelets 319  279        Total Cholesterol       187    Triglycerides       158 (A)    HDL Cholesterol       41    LDL Cholesterol        118 (A)    Hemoglobin A1C    6.80 (A)       Uric Acid     9.4 (A)      (A) Abnormal value       Comments are available for some flowsheets but are not being displayed.           Data reviewed: Cardiology studies 2d echo 7/5/22, lexiscan 7/5/22, holter monitor 7/5/22 and left heart catheterization 8/5/22          Assessment and Plan    Diagnoses and all orders for this visit:    1. Chronic combined systolic and diastolic heart failure (HCC) (Primary)- EF 36 to 40% on 2D echo 7/5/2022. NYHA class II.  Compensated.  Stop atenolol.  Start metoprolol succinate 25 mg daily.  Stop amlodipine. Start Entresto 24/26 mg twice daily.  BMP in 1 week. Reviewed signs and symptoms of CHF and what to report with the patient. Patient instructed to restrict sodium and weigh daily. Report weight gain of greater than 2 lbs overnight or 5 lbs in 1 week. Pt verbalized  understanding of instructions and plan of care. Consider initiation of Jardiance in the future, if tolerated.      2. Coronary artery disease involving native coronary artery of native heart without angina pectoris-subtotally occluded mid LAD, 50% stenosis of the ostial ramus, 40% stenosis of the mid RCA, subtotally occluded PDL with collaterals noted from the right coronary artery reconstituting in the distal and apical LAD on left heart catheterization 8/5/2022.  Patient has been referred to CT surgery for evaluation for potential CABG and is scheduled to see Dr. Keith Griffin on 9/8/2022.  Stable.  Continue Imdur.    3. Primary hypertension-blood pressures are well controlled.  Continue to monitor following above medication adjustment.  Continue amlodipine.  Monitor and record daily blood pressure. Report readings consistently higher than 130/90 or consistently lower than 100/60.     4. Hyperlipidemia LDL goal <70-continue atorvastatin.  Recheck lipid panel around 11/7/2022.    5. Stage 3b chronic kidney disease (HCC)- patient is following with nephrology outpatient.  Stable.  Continue to monitor following above medication adjustment.    6. Class 1 obesity due to excess calories with serious comorbidity and body mass index (BMI) of 30.0 to 30.9 in adult- BMI is >= 30 and <35. (Class 1 Obesity). The following options were offered after discussion;: weight loss educational material (shared in after visit summary).    Current outpatient and discharge medications have been reconciled for the patient.  Reviewed by: CAROL Perez        Follow Up   Return in about 4 weeks (around 9/21/2022) for Next scheduled follow up.  Patient was given instructions and counseling regarding his condition or for health maintenance advice. Please see specific information pulled into the AVS if appropriate.

## 2022-08-24 ENCOUNTER — OFFICE VISIT (OUTPATIENT)
Dept: CARDIOLOGY | Facility: CLINIC | Age: 77
End: 2022-08-24

## 2022-08-24 VITALS
BODY MASS INDEX: 30.01 KG/M2 | WEIGHT: 198 LBS | HEART RATE: 54 BPM | HEIGHT: 68 IN | OXYGEN SATURATION: 95 % | SYSTOLIC BLOOD PRESSURE: 138 MMHG | DIASTOLIC BLOOD PRESSURE: 72 MMHG

## 2022-08-24 DIAGNOSIS — I10 PRIMARY HYPERTENSION: ICD-10-CM

## 2022-08-24 DIAGNOSIS — N18.32 STAGE 3B CHRONIC KIDNEY DISEASE: ICD-10-CM

## 2022-08-24 DIAGNOSIS — I50.42 CHRONIC COMBINED SYSTOLIC AND DIASTOLIC HEART FAILURE: Primary | ICD-10-CM

## 2022-08-24 DIAGNOSIS — I25.10 CORONARY ARTERY DISEASE INVOLVING NATIVE CORONARY ARTERY OF NATIVE HEART WITHOUT ANGINA PECTORIS: ICD-10-CM

## 2022-08-24 DIAGNOSIS — E78.5 HYPERLIPIDEMIA LDL GOAL <70: ICD-10-CM

## 2022-08-24 DIAGNOSIS — E66.09 CLASS 1 OBESITY DUE TO EXCESS CALORIES WITH SERIOUS COMORBIDITY AND BODY MASS INDEX (BMI) OF 30.0 TO 30.9 IN ADULT: ICD-10-CM

## 2022-08-24 PROBLEM — E66.811 CLASS 1 OBESITY DUE TO EXCESS CALORIES WITH SERIOUS COMORBIDITY AND BODY MASS INDEX (BMI) OF 30.0 TO 30.9 IN ADULT: Status: ACTIVE | Noted: 2022-08-24

## 2022-08-24 PROCEDURE — 99214 OFFICE O/P EST MOD 30 MIN: CPT | Performed by: NURSE PRACTITIONER

## 2022-08-24 RX ORDER — METOPROLOL SUCCINATE 25 MG/1
25 TABLET, EXTENDED RELEASE ORAL DAILY
Qty: 90 TABLET | Refills: 3 | Status: SHIPPED | OUTPATIENT
Start: 2022-08-24

## 2022-08-24 RX ORDER — SACUBITRIL AND VALSARTAN 24; 26 MG/1; MG/1
1 TABLET, FILM COATED ORAL 2 TIMES DAILY
Qty: 60 TABLET | Refills: 11 | Status: SHIPPED | OUTPATIENT
Start: 2022-08-24

## 2022-08-31 ENCOUNTER — HOSPITAL ENCOUNTER (OUTPATIENT)
Dept: CT IMAGING | Facility: HOSPITAL | Age: 77
Discharge: HOME | End: 2022-08-31
Admitting: NURSE PRACTITIONER

## 2022-08-31 DIAGNOSIS — I65.29 STENOSIS OF CAROTID ARTERY, UNSPECIFIED LATERALITY: ICD-10-CM

## 2022-08-31 LAB — CREAT BLDA-MCNC: 2.1 MG/DL (ref 0.6–1.3)

## 2022-08-31 PROCEDURE — 82565 ASSAY OF CREATININE: CPT

## 2022-08-31 PROCEDURE — 70498 CT ANGIOGRAPHY NECK: CPT

## 2022-08-31 PROCEDURE — 0 IOPAMIDOL PER 1 ML: Performed by: NURSE PRACTITIONER

## 2022-08-31 RX ADMIN — IOPAMIDOL 75 ML: 755 INJECTION, SOLUTION INTRAVENOUS at 14:57

## 2022-09-02 ENCOUNTER — TELEPHONE (OUTPATIENT)
Dept: CARDIOLOGY | Facility: CLINIC | Age: 77
End: 2022-09-02

## 2022-09-02 DIAGNOSIS — I50.42 CHRONIC COMBINED SYSTOLIC AND DIASTOLIC HEART FAILURE: Primary | ICD-10-CM

## 2022-09-02 NOTE — TELEPHONE ENCOUNTER
Call returned to wife.  Small knot, blue in color, states blood draw hurt.  Explained small hematoma's will develop on occasion and resolve.      HV

## 2022-09-02 NOTE — TELEPHONE ENCOUNTER
Caller: Mitzy Philip    Relationship: Emergency Contact    Best call back number: [phone]      Who are you requesting to speak with (clinical staff, provider,  specific staff member): CLINICAL    What was the call regarding: PT WIFE CALLED ON PT BEHALF - HE HAD BLOODWORK DONE TODAY AND NOW HAS A KNOT ON HIS ARM AND PT WIFE JUST WANTED TO SEE IF THAT WAS NORMAL    Do you require a callback: YES PLEASE

## 2022-09-06 DIAGNOSIS — I25.10 CORONARY ARTERY DISEASE INVOLVING NATIVE CORONARY ARTERY OF NATIVE HEART WITHOUT ANGINA PECTORIS: Primary | ICD-10-CM

## 2022-09-08 ENCOUNTER — OFFICE VISIT (OUTPATIENT)
Dept: CARDIAC SURGERY | Facility: CLINIC | Age: 77
End: 2022-09-08

## 2022-09-08 VITALS
BODY MASS INDEX: 30.55 KG/M2 | WEIGHT: 201.6 LBS | HEART RATE: 63 BPM | OXYGEN SATURATION: 98 % | SYSTOLIC BLOOD PRESSURE: 110 MMHG | HEIGHT: 68 IN | DIASTOLIC BLOOD PRESSURE: 80 MMHG

## 2022-09-08 DIAGNOSIS — I25.10 CORONARY ARTERY DISEASE INVOLVING NATIVE CORONARY ARTERY OF NATIVE HEART WITHOUT ANGINA PECTORIS: Primary | ICD-10-CM

## 2022-09-08 PROCEDURE — 99214 OFFICE O/P EST MOD 30 MIN: CPT | Performed by: SURGERY

## 2022-09-13 ENCOUNTER — TELEPHONE (OUTPATIENT)
Dept: CARDIOLOGY | Facility: CLINIC | Age: 77
End: 2022-09-13

## 2022-09-13 DIAGNOSIS — I50.42 CHRONIC COMBINED SYSTOLIC AND DIASTOLIC HEART FAILURE: Primary | ICD-10-CM

## 2022-09-13 NOTE — TELEPHONE ENCOUNTER
Caller: Gallo Philip    Relationship to patient: Self    Best call back number: [phone]    Patient is needing: PT CALLING IN FOR LAB RESULTS FROM 9.9.22

## 2022-09-20 NOTE — PROGRESS NOTES
"    [facility] Cardiothoracic Surgery  PROGRESS NOTE   CC: Severe coronary disease    Subjective:  Mr. Philip is a 77-year-old male who returns today in clinic to further discuss his coronary artery disease and the potential for CABG.  He is doing better since being in the hospital.  He does have a history of stage IIIb kidney disease with baseline creatinine around 2.3.  In review I first saw him in the hospital at the beginning of August where his LVEF is around 35-40, he was having escalating anginal type symptoms, his coronary angiography showed severe LAD disease with a  with left to left collaterals right at the takeoff of the large diagonal artery.  On echo the myocardium in the LAD distribution was akinetic.  Since that time he has been better from a chest pain standpoint.  Currently he essentially has no chest pain.  He occasionally has some leg swelling.  No significant shortness of breath doing the things that he likes to do.    ROS: No current chest pain, no recent illnesses, no fevers or chills    Objective:      /80 (BP Location: Right arm, Patient Position: Sitting, Cuff Size: Adult)   Pulse 63   Ht 172.7 cm (68\")   Wt 91.4 kg (201 lb 9.6 oz)   SpO2 98%   BMI 30.65 kg/m²       PE:  Vitals:    09/08/22 0959   BP: 110/80   Pulse: 63   SpO2: 98%     GENERAL: NAD, resting comfortably, normal color  CARDIOVASCULAR: regular, regular rate, sinus  PULMONARY: Normal bilateral breath sounds, no labored breathing  ABDOMEN: soft, nontender/nondistended  EXTREMITIES: mild peripheral edema, normal pulses, normal ROM        Lab Results (last 72 hours)     ** No results found for the last 72 hours. **          Cath:  Conclusion of cardiac catheterization    8/5/2022     Normal left main coronary artery  Mid left anterior descending coronary artery subtotally occluded at the bifurcation site with a moderate sized second diagonal branch  After the subtotal occlusion in the mid left " anterior descending coronary artery is fairly well visualized and is satisfactory for grafting ramus branch with approximately 50% stenosis in the ostial segment  No high-grade obstructive disease of the left circumflex coronary artery noted  Moderate atherosclerotic changes with some aneurysmal dilatation of the proximal right coronary artery  Midportion has approximately a 40% stenosis  No significant disease of the right posterior descending coronary artery  Subtotally occluded proximal moderate-sized right posterior lateral artery  Collaterals noted from the right coronary artery reconstituting in the distal and apical left anterior descending coronary artery      Left heart cath: LVEDP 16 mm Hg with no gradient across aortic valve on pullback.      LV Gram: Not performed to save contrast     Interventions: None  ____________________________________________________________________________________________________________________________________________  Estimated Blood Loss:  Minimal         Complications:  None; patient tolerated the procedure well.     Specimens: None           Disposition: Cardiovascular observation unit              Condition: stable            I supervised the administration of conscious sedation by nursing staff throughout the case.       Immediately prior to the procedure the patient was re-examined and subsequently the  first dose was given at 1529   Hours  and the end of my face-to-face encounter was at 1546 Hours.          During the case, continuous pulse oximetry, heart rate, blood pressure, and patient status were monitored.         ____________________________________________________________________________________________________________________________________________     Conclusion of cardiac catheterization    8/5/2022     Normal left main coronary artery  Mid left anterior descending coronary artery subtotally occluded at the bifurcation site with a moderate sized second  diagonal branch  After the subtotal occlusion in the mid left anterior descending coronary artery is fairly well visualized and is satisfactory for grafting ramus branch with approximately 50% stenosis in the ostial segment  No high-grade obstructive disease of the left circumflex coronary artery noted  Moderate atherosclerotic changes with some aneurysmal dilatation of the proximal right coronary artery  Midportion has approximately a 40% stenosis  No significant disease of the right posterior descending coronary artery  Subtotally occluded proximal moderate-sized right posterior lateral artery  Collaterals noted from the right coronary artery reconstituting in the distal and apical left anterior descending coronary artery  Patent bilateral internal mammary arteries that can be used as a surgical conduit  Mildly elevated left ventricular end-diastolic pressure 16 mmHg  Left ventriculogram not performed to save contrast due to stage IIIb renal failure        ____________________________________________________________________________________________________________________________________________     Plan after cardiac catheterization     Will refer for CT surgery evaluation and consideration for possible aortocoronary bypass surgery  May require work-up for viable hibernating myocardium  Given shortage of thallium may consider gadolinium enhanced cardiac MRI however given renal failure this may not be feasible  Will defer to surgery whether aortocoronary bypass surgery is feasible/advisable in the above scenario  Patient will be admitted for gentle hydration given renal failure  Usual post procedure precautions after arteriotomy including monitoring for signs both local and systemic side effects.  On ultimate discharge will receive printed instructions  Intensive risk factor modifications for both primary and secondary prevention if applicable  Hydration   Observation    ECHO:  Interpretation Summary    · The left  ventricular cavity is mildly dilated.  · Left ventricular ejection fraction appears to be 36 - 40%. Left ventricular systolic function is moderately decreased.  · The following left ventricular wall segments are akinetic: apical anterior, apical lateral, apical inferior, apical septal, apex and mid anteroseptal.  · Left ventricular diastolic function is consistent with (grade I) impaired relaxation.  · Left atrial volume is mildly increased.  · Estimated right ventricular systolic pressure from tricuspid regurgitation is normal (<35 mmHg).  · There is a trivial pericardial effusion. The effusion is fluid filled. There is no evidence of cardiac tamponade.  · Compared to echo dated 4/29/2017 LVEF has dropped from 60% to 36-40% with new wall motion abnormalities        Assessment & Plan     Mr. Philip is a 77-year-old male with history of stage IIIb kidney disease, he originally presented beginning of August with concerning escalating angina.  He now is presents in follow-up and his anginal symptoms are improved.  He does not have significant heart failure symptoms either.  He is being followed by Dr. Cash.  We have requested and is ordered to have MRI viability study to assess the viability of the myocardium in the LAD distribution.  He will be at high risk for CABG given his chronic kidney disease for worsening renal function and subsequent renal failure.  Given this I would prefer to see if there is viable myocardium in this distribution to better assess the risk versus benefit of proceeding with CABG.  If there is no viable myocardium in this area I do not believe the risk warrant the benefit.  We discussed this at length today and he understands.  He would rather not have CABG unless there is definite viability of his myocardium in the diseased area.    I will review the results of his MRI viability study, we will see him back in clinic if there is viable myocardium.  I discussed this with Dr. Cash.  Thank you for  trusting me with the care of Mr. Philip.  Please do not hesitate to call with questions or concerns.    Keith Griffin MD  Cardiothoracic Surgeon

## 2022-09-20 NOTE — TELEPHONE ENCOUNTER
Caller: Mitzy Philip    Relationship: Emergency Contact    Best call back number: [phone]    Requested Prescriptions:   Requested Prescriptions     Pending Prescriptions Disp Refills   • allopurinol (ZYLOPRIM) 100 MG tablet 30 tablet 1     Sig: Take 2 tablets by mouth Daily.        Pharmacy where request should be sent: [facility] [address] [place] [address] [phone] [facility] [phone] FX     Additional details provided by patient: PATIENT WILL BE TAKING HIS LAST DOSE TONIGHT 9.20.22. PATIENT'S WIFE WOULD LIKE A CALL WHEN IT IS SENT OVER.     Does the patient have less than a 3 day supply:  [x] Yes  [] No    Adan Edward Rep   09/20/22 10:57 CDT

## 2022-09-20 NOTE — TELEPHONE ENCOUNTER
PATIENT'S WIFE CALLED BACK TO CHECK BECAUSE PHARMACY HAD NOT RECEIVED MEDICATION YET. SHE WOULD LIKE A CALL BACK WHEN IT IS SENT OVER.

## 2022-09-21 RX ORDER — ALLOPURINOL 100 MG/1
200 TABLET ORAL DAILY
Qty: 60 TABLET | Refills: 11 | Status: SHIPPED | OUTPATIENT
Start: 2022-09-21 | End: 2022-11-29

## 2022-10-04 NOTE — PROGRESS NOTES
Chief Complaint  Congestive Heart Failure (Patient says he has not had any shortness of breath or swelling. Has not had any chest discomfort.)    Subjective          Gallo Philip presents to [facility]ARDIOLOG[facility] [id number] for routine follow-up of medication adjustment. Atenolol and amlodipine were discontinued and he was started on metoprolol succinate and Entresto 24/26 mg twice daily following his last office visit on 8/24/22. Follow up BMP on 9/9/22 revealed elevated creatinine of 2.33 on 9/13/22. He has chronic combined systolic and diastolic congestive heart failure, coronary artery disease (subtotally occluded mid LAD, 50% stenosis of the ostial ramus, 40% stenosis of the mid RCA, subtotally occluded PDL with collaterals noted from the right coronary artery reconstituting in the distal and apical LAD), hypertension, hyperlipidemia, stage III chronic kidney disease, cerebrovascular accident, thoracic outlet syndrome and former obesity. Patient denies chest pain, shortness of breath, palpitations, dizziness, syncope, orthopnea, PND, edema or decreased stamina.  Patient denies any signs of bleeding.    Congestive Heart Failure  Presents for follow-up visit. Pertinent negatives include no abdominal pain, chest pain, chest pressure, claudication, edema, fatigue, muscle weakness, near-syncope, nocturia, orthopnea, palpitations, paroxysmal nocturnal dyspnea, shortness of breath or unexpected weight change. The symptoms have been stable. His past medical history is significant for CAD. Compliance with total regimen is 51-75%. Compliance with diet is 51-75%. Compliance with exercise is 51-75%. Compliance with medications is %.   Coronary Artery Disease  Presents for follow-up visit. Pertinent negatives include no chest pain, chest pressure, chest tightness, dizziness, leg swelling, muscle weakness, palpitations, shortness of breath or weight gain. Risk factors include hyperlipidemia. Risk  factors do not include obesity. His past medical history is significant for CHF. The symptoms have been stable. Compliance with diet is variable. Compliance with exercise is variable. Compliance with medications is good.   Hypertension  This is a chronic problem. The current episode started more than 1 year ago. The problem is controlled. Pertinent negatives include no anxiety, blurred vision, chest pain, headaches, malaise/fatigue, neck pain, orthopnea, palpitations, peripheral edema, PND, shortness of breath or sweats. Risk factors for coronary artery disease include male gender, dyslipidemia and obesity. Current antihypertension treatment includes calcium channel blockers, beta blockers and direct vasodilators. The current treatment provides significant improvement. Hypertensive end-organ damage includes kidney disease, CAD/MI, CVA and heart failure. Identifiable causes of hypertension include chronic renal disease.   Hyperlipidemia  This is a chronic problem. The current episode started more than 1 year ago. Exacerbating diseases include chronic renal disease. He has no history of obesity. Pertinent negatives include no chest pain or shortness of breath. Current antihyperlipidemic treatment includes statins. Risk factors for coronary artery disease include male sex, obesity, hypertension and dyslipidemia.       Objective     Current Outpatient Medications:   •  allopurinol (ZYLOPRIM) 100 MG tablet, Take 2 tablets by mouth Daily., Disp: 60 tablet, Rfl: 11  •  aspirin 81 MG tablet, Take 81 mg by mouth Daily., Disp: , Rfl:   •  atorvastatin (LIPITOR) 40 MG tablet, Take 1 tablet by mouth Every Night., Disp: 90 tablet, Rfl: 3  •  calcitriol (ROCALTROL) 0.25 MCG capsule, Take 1 capsule by mouth Daily., Disp: 30 capsule, Rfl: 3  •  clopidogrel (PLAVIX) 75 MG tablet, Take 75 mg by mouth Daily., Disp: , Rfl:   •  isosorbide mononitrate (IMDUR) 30 MG 24 hr tablet, Take 1 tablet by mouth Daily., Disp: 30 tablet, Rfl: 11  •  " metoprolol succinate XL (TOPROL-XL) 25 MG 24 hr tablet, Take 1 tablet by mouth Daily., Disp: 90 tablet, Rfl: 3  •  nitroglycerin (NITROSTAT) 0.4 MG SL tablet, 1 under the tongue as needed for angina, may repeat q5mins for up three doses, Disp: 25 tablet, Rfl: 3  •  pantoprazole (PROTONIX) 40 MG EC tablet, Take 1 tablet by mouth Every Morning., Disp: 30 tablet, Rfl: 3  •  sacubitril-valsartan (Entresto) 24-26 MG tablet, Take 1 tablet by mouth 2 (Two) Times a Day., Disp: 60 tablet, Rfl: 11  •  empagliflozin (Jardiance) 10 MG tablet tablet, Take 1 tablet by mouth Daily., Disp: 30 tablet, Rfl: 11  Vital Signs:   BP (!) 160/104   Pulse (!) 48   Ht 172.7 cm (68\")   Wt 92.1 kg (203 lb)   SpO2 98%   BMI 30.87 kg/m²     Vitals and nursing note reviewed.   Constitutional:       General: Awake.      Appearance: Normal and healthy appearance. Well-developed and not in distress. Obese.   Eyes:      General: Lids are normal.      Conjunctiva/sclera: Conjunctivae normal.      Pupils: Pupils are equal, round, and reactive to light.   HENT:      Head: Normocephalic and atraumatic.      Nose: Nose normal.   Neck:      Vascular: No JVR. JVD normal.   Pulmonary:      Effort: Pulmonary effort is normal.      Breath sounds: Normal breath sounds. No wheezing. No rhonchi. No rales.   Chest:      Chest wall: Not tender to palpatation.   Cardiovascular:      PMI at left midclavicular line. Normal rate. Regular rhythm. Normal S1. Normal S2.      Murmurs: There is no murmur.      No gallop. No click. No rub.   Pulses:     Intact distal pulses.   Edema:     Peripheral edema present.     Pretibial: bilateral trace edema of the pretibial area.     Ankle: bilateral trace edema of the ankle.     Feet: bilateral trace edema of the feet.  Abdominal:      General: Bowel sounds are normal.      Palpations: Abdomen is soft.      Tenderness: There is no abdominal tenderness.   Musculoskeletal: Normal range of motion.         General: No tenderness. "      Cervical back: Normal range of motion. Skin:     General: Skin is warm and dry.   Neurological:      General: No focal deficit present.      Mental Status: Alert, oriented to person, place, and time and oriented to person, place and time.   Psychiatric:         Attention and Perception: Attention and perception normal.         Mood and Affect: Mood and affect normal.         Speech: Speech normal.         Behavior: Behavior normal. Behavior is cooperative.         Thought Content: Thought content normal.         Cognition and Memory: Cognition and memory normal.         Judgment: Judgment normal.        Result Review :   The following data was reviewed by: CAROL Perez on 10/05/2022:  Common labs    Common Labs 8/6/22 8/6/22 8/6/22 8/6/22 8/6/22 8/7/22 8/31/22    0430 0430 0430 0430 0430     Glucose    122 (A)  136 (A)    BUN    37 (A)  37 (A)    Creatinine    2.12 (A)  1.93 (A) 2.10 (A)   Sodium    138  138    Potassium    4.2  4.0    Chloride    105  106    Calcium    9.0  9.0    WBC 8.96         Hemoglobin 13.7         Hematocrit 41.6         Platelets 279         Total Cholesterol     187     Triglycerides     158 (A)     HDL Cholesterol     41     LDL Cholesterol      118 (A)     Hemoglobin A1C  6.80 (A)        Uric Acid   9.4 (A)       (A) Abnormal value       Comments are available for some flowsheets but are not being displayed.           Data reviewed: Cardiology studies 2d echo 7/5/22, lexiscan 7/5/22, holter monitor 7/5/22 and left heart catheterization 8/5/22     ECG 12 Lead    Date/Time: 10/5/2022 11:13 AM  Performed by: Toya Ramey APRN  Authorized by: Toya Ramey APRN   Comparison: compared with previous ECG from 6/24/2022  Similar to previous ECG  Rhythm: sinus bradycardia  Ectopy: unifocal PVCs  Rate: bradycardic  BPM: 59  Conduction: left bundle branch block    Clinical impression: abnormal EKG              Assessment and Plan    Diagnoses and all orders for this  visit:    1. Chronic combined systolic and diastolic heart failure (HCC) (Primary)- EF 36 to 40% on 2D echo 7/5/2022. NYHA class II.  Compensated.  Start Jardiance 10 mg daily.  BMP today and in 1 week. Reviewed signs and symptoms of CHF and what to report with the patient. Patient instructed to restrict sodium and weigh daily. Report weight gain of greater than 2 lbs overnight or 5 lbs in 1 week. Pt verbalized understanding of instructions and plan of care. Continue Entresto and metoprolol succinate. Consider initiation of spironolactone in the future, if tolerated.     2. Coronary artery disease involving native coronary artery of native heart without angina pectoris-subtotally occluded mid LAD, 50% stenosis of the ostial ramus, 40% stenosis of the mid RCA, subtotally occluded PDL with collaterals noted from the right coronary artery reconstituting in the distal and apical LAD on left heart catheterization 8/5/2022.  Patient has been referred to CT surgery for evaluation for potential CABG and is following with Dr. Keith Griffin. He is awaiting a cardiac MRI for viability study. Stable.  Continue Imdur.    3. Primary hypertension-blood pressure is markedly elevated in office today, however pt reports readings consistently lower than 130/90 at home.  Continue to monitor following above medication adjustment. Continue metoprolol succinate and Entresto. Monitor and record daily blood pressure. Report readings consistently higher than 130/90 or consistently lower than 100/60.     4. Hyperlipidemia LDL goal <70-continue atorvastatin.  Recheck lipid panel around 11/7/2022.    5. Stage 3b chronic kidney disease (HCC)- patient is following with nephrology outpatient.  Stable.  Continue to monitor following above medication adjustment.    6. Class 1 obesity due to excess calories with serious comorbidity and body mass index (BMI) of 30.0 to 30.9 in adult- BMI is >= 30 and <35. (Class 1 Obesity). The following options were  offered after discussion;: weight loss educational material (shared in after visit summary).    Follow Up   Return in about 4 weeks (around 11/2/2022) for Next scheduled follow up.  Patient was given instructions and counseling regarding his condition or for health maintenance advice. Please see specific information pulled into the AVS if appropriate.

## 2022-10-05 ENCOUNTER — OFFICE VISIT (OUTPATIENT)
Dept: CARDIOLOGY | Facility: CLINIC | Age: 77
End: 2022-10-05

## 2022-10-05 VITALS
SYSTOLIC BLOOD PRESSURE: 160 MMHG | BODY MASS INDEX: 30.77 KG/M2 | HEIGHT: 68 IN | DIASTOLIC BLOOD PRESSURE: 104 MMHG | HEART RATE: 48 BPM | WEIGHT: 203 LBS | OXYGEN SATURATION: 98 %

## 2022-10-05 DIAGNOSIS — I25.10 CORONARY ARTERY DISEASE INVOLVING NATIVE CORONARY ARTERY OF NATIVE HEART WITHOUT ANGINA PECTORIS: ICD-10-CM

## 2022-10-05 DIAGNOSIS — I50.42 CHRONIC COMBINED SYSTOLIC AND DIASTOLIC HEART FAILURE: Primary | ICD-10-CM

## 2022-10-05 DIAGNOSIS — N18.32 STAGE 3B CHRONIC KIDNEY DISEASE: ICD-10-CM

## 2022-10-05 DIAGNOSIS — E78.5 HYPERLIPIDEMIA LDL GOAL <70: ICD-10-CM

## 2022-10-05 DIAGNOSIS — E66.09 CLASS 1 OBESITY DUE TO EXCESS CALORIES WITH SERIOUS COMORBIDITY AND BODY MASS INDEX (BMI) OF 30.0 TO 30.9 IN ADULT: ICD-10-CM

## 2022-10-05 DIAGNOSIS — I10 PRIMARY HYPERTENSION: ICD-10-CM

## 2022-10-05 PROCEDURE — 93000 ELECTROCARDIOGRAM COMPLETE: CPT | Performed by: NURSE PRACTITIONER

## 2022-10-05 PROCEDURE — 99214 OFFICE O/P EST MOD 30 MIN: CPT | Performed by: NURSE PRACTITIONER

## 2022-10-12 ENCOUNTER — TELEPHONE (OUTPATIENT)
Dept: CARDIOLOGY | Facility: CLINIC | Age: 77
End: 2022-10-12

## 2022-10-12 NOTE — TELEPHONE ENCOUNTER
The  DIARRHEA,  COUGHING AND SPITTING UP all started yesterday and he has his cardiac mri done Friday in [place].

## 2022-10-12 NOTE — TELEPHONE ENCOUNTER
Caller: Mitzy Philip    Relationship: Emergency Contact    Best call back number: [phone]    What medications are you currently taking:   Current Outpatient Medications on File Prior to Visit   Medication Sig Dispense Refill   • allopurinol (ZYLOPRIM) 100 MG tablet Take 2 tablets by mouth Daily. 60 tablet 11   • aspirin 81 MG tablet Take 81 mg by mouth Daily.     • atorvastatin (LIPITOR) 40 MG tablet Take 1 tablet by mouth Every Night. 90 tablet 3   • calcitriol (ROCALTROL) 0.25 MCG capsule Take 1 capsule by mouth Daily. 30 capsule 3   • clopidogrel (PLAVIX) 75 MG tablet Take 75 mg by mouth Daily.     • empagliflozin (Jardiance) 10 MG tablet tablet Take 1 tablet by mouth Daily. 30 tablet 11   • isosorbide mononitrate (IMDUR) 30 MG 24 hr tablet Take 1 tablet by mouth Daily. 30 tablet 11   • metoprolol succinate XL (TOPROL-XL) 25 MG 24 hr tablet Take 1 tablet by mouth Daily. 90 tablet 3   • nitroglycerin (NITROSTAT) 0.4 MG SL tablet 1 under the tongue as needed for angina, may repeat q5mins for up three doses 25 tablet 3   • pantoprazole (PROTONIX) 40 MG EC tablet Take 1 tablet by mouth Every Morning. 30 tablet 3   • sacubitril-valsartan (Entresto) 24-26 MG tablet Take 1 tablet by mouth 2 (Two) Times a Day. 60 tablet 11     No current facility-administered medications on file prior to visit.          When did you start taking these medications: Friday NIGHT    Which medication are you concerned about: PRASHANTH    Who prescribed you this medication: BRADLEY MEDINA     What are your concerns: PT REPORTS HE HAS HAD DIARRHEA, ACID REFLUX, COUGHING AND SPITTING UP STUFF WHICH IS ALL OUT OF THE NORM

## 2022-10-13 NOTE — TELEPHONE ENCOUNTER
Patients wife called and stated that he has not taken his Jardiance in 2 days and symptoms have improved. They are choosing to D/C.

## 2022-11-08 ENCOUNTER — LAB (OUTPATIENT)
Dept: LAB | Facility: HOSPITAL | Age: 77
End: 2022-11-08

## 2022-11-08 ENCOUNTER — OFFICE VISIT (OUTPATIENT)
Dept: CARDIOLOGY | Facility: CLINIC | Age: 77
End: 2022-11-08

## 2022-11-08 ENCOUNTER — TELEPHONE (OUTPATIENT)
Dept: CARDIAC SURGERY | Facility: CLINIC | Age: 77
End: 2022-11-08

## 2022-11-08 VITALS
DIASTOLIC BLOOD PRESSURE: 90 MMHG | SYSTOLIC BLOOD PRESSURE: 142 MMHG | BODY MASS INDEX: 30.31 KG/M2 | HEIGHT: 68 IN | WEIGHT: 200 LBS | HEART RATE: 56 BPM

## 2022-11-08 DIAGNOSIS — R07.9 CHEST PAIN IN ADULT: ICD-10-CM

## 2022-11-08 DIAGNOSIS — E78.5 HYPERLIPIDEMIA LDL GOAL <70: ICD-10-CM

## 2022-11-08 DIAGNOSIS — I50.42 CHRONIC COMBINED SYSTOLIC AND DIASTOLIC HEART FAILURE: ICD-10-CM

## 2022-11-08 DIAGNOSIS — N18.32 STAGE 3B CHRONIC KIDNEY DISEASE: ICD-10-CM

## 2022-11-08 DIAGNOSIS — I25.10 CORONARY ARTERY DISEASE INVOLVING NATIVE CORONARY ARTERY OF NATIVE HEART WITHOUT ANGINA PECTORIS: ICD-10-CM

## 2022-11-08 DIAGNOSIS — N18.32 STAGE 3B CHRONIC KIDNEY DISEASE: Primary | ICD-10-CM

## 2022-11-08 LAB
ALBUMIN SERPL-MCNC: 3.8 G/DL (ref 3.5–5.2)
ALBUMIN/GLOB SERPL: 1.4 G/DL
ALP SERPL-CCNC: 94 U/L (ref 39–117)
ALT SERPL W P-5'-P-CCNC: 19 U/L (ref 1–41)
ANION GAP SERPL CALCULATED.3IONS-SCNC: 9 MMOL/L (ref 5–15)
AST SERPL-CCNC: 17 U/L (ref 1–40)
BASOPHILS # BLD AUTO: 0.13 10*3/MM3 (ref 0–0.2)
BASOPHILS NFR BLD AUTO: 1 % (ref 0–1.5)
BILIRUB SERPL-MCNC: 0.4 MG/DL (ref 0–1.2)
BUN SERPL-MCNC: 29 MG/DL (ref 8–23)
BUN/CREAT SERPL: 17.3 (ref 7–25)
CALCIUM SPEC-SCNC: 9.2 MG/DL (ref 8.6–10.5)
CHLORIDE SERPL-SCNC: 103 MMOL/L (ref 98–107)
CO2 SERPL-SCNC: 26 MMOL/L (ref 22–29)
CREAT SERPL-MCNC: 1.68 MG/DL (ref 0.76–1.27)
DEPRECATED RDW RBC AUTO: 52.2 FL (ref 37–54)
EGFRCR SERPLBLD CKD-EPI 2021: 42.1 ML/MIN/1.73
EOSINOPHIL # BLD AUTO: 0.24 10*3/MM3 (ref 0–0.4)
EOSINOPHIL NFR BLD AUTO: 1.8 % (ref 0.3–6.2)
ERYTHROCYTE [DISTWIDTH] IN BLOOD BY AUTOMATED COUNT: 14.7 % (ref 12.3–15.4)
GLOBULIN UR ELPH-MCNC: 2.8 GM/DL
GLUCOSE SERPL-MCNC: 112 MG/DL (ref 65–99)
HCT VFR BLD AUTO: 43.5 % (ref 37.5–51)
HGB BLD-MCNC: 13.9 G/DL (ref 13–17.7)
IMM GRANULOCYTES # BLD AUTO: 0.34 10*3/MM3 (ref 0–0.05)
IMM GRANULOCYTES NFR BLD AUTO: 2.5 % (ref 0–0.5)
LYMPHOCYTES # BLD AUTO: 3.12 10*3/MM3 (ref 0.7–3.1)
LYMPHOCYTES NFR BLD AUTO: 23.4 % (ref 19.6–45.3)
MCH RBC QN AUTO: 30.8 PG (ref 26.6–33)
MCHC RBC AUTO-ENTMCNC: 32 G/DL (ref 31.5–35.7)
MCV RBC AUTO: 96.2 FL (ref 79–97)
MONOCYTES # BLD AUTO: 0.87 10*3/MM3 (ref 0.1–0.9)
MONOCYTES NFR BLD AUTO: 6.5 % (ref 5–12)
NEUTROPHILS NFR BLD AUTO: 64.8 % (ref 42.7–76)
NEUTROPHILS NFR BLD AUTO: 8.66 10*3/MM3 (ref 1.7–7)
NRBC BLD AUTO-RTO: 0 /100 WBC (ref 0–0.2)
PLATELET # BLD AUTO: 338 10*3/MM3 (ref 140–450)
PMV BLD AUTO: 9.8 FL (ref 6–12)
POTASSIUM SERPL-SCNC: 4.3 MMOL/L (ref 3.5–5.2)
PROT SERPL-MCNC: 6.6 G/DL (ref 6–8.5)
RBC # BLD AUTO: 4.52 10*6/MM3 (ref 4.14–5.8)
SODIUM SERPL-SCNC: 138 MMOL/L (ref 136–145)
WBC NRBC COR # BLD: 13.36 10*3/MM3 (ref 3.4–10.8)

## 2022-11-08 PROCEDURE — 85025 COMPLETE CBC W/AUTO DIFF WBC: CPT

## 2022-11-08 PROCEDURE — 36415 COLL VENOUS BLD VENIPUNCTURE: CPT

## 2022-11-08 PROCEDURE — 99214 OFFICE O/P EST MOD 30 MIN: CPT | Performed by: PHYSICIAN ASSISTANT

## 2022-11-08 PROCEDURE — 80053 COMPREHEN METABOLIC PANEL: CPT

## 2022-11-08 PROCEDURE — 93000 ELECTROCARDIOGRAM COMPLETE: CPT | Performed by: PHYSICIAN ASSISTANT

## 2022-11-08 RX ORDER — ATORVASTATIN CALCIUM 80 MG/1
80 TABLET, FILM COATED ORAL NIGHTLY
Qty: 90 TABLET | Refills: 3 | Status: SHIPPED | OUTPATIENT
Start: 2022-11-08

## 2022-11-08 RX ORDER — SPIRONOLACTONE 25 MG/1
12.5 TABLET ORAL DAILY
Qty: 30 TABLET | Refills: 11 | Status: SHIPPED | OUTPATIENT
Start: 2022-11-08 | End: 2023-01-08 | Stop reason: HOSPADM

## 2022-11-08 NOTE — PROGRESS NOTES
[facility] -  CLINIC FOLLOW UP     Patient Care Team:  Carol Mccallum APRN as PCP - General (Nurse Practitioner)  Javier Gan MD as Referring Physician (General Practice)  Hero Cash MD as Cardiologist (Cardiology)  Carol Mccallum APRN as Referring Physician (Nurse Practitioner)  Keith Griffin MD as Consulting Physician (Cardiothoracic Surgery)    Chief Complaint: Follow up to cardiac MRI    Subjective     HPI: Today I had the pleasure of seeing Gallo Philip in the cardiology clinic for follow up. He is a 77 year old male followed for chronic combined systolic and diastolic congestive heart failure, coronary artery disease (subtotally occluded mid LAD, 50% stenosis of the ostial ramus, 40% stenosis of the mid RCA, subtotally occluded PDL with collaterals noted from the right coronary artery reconstituting in the distal and apical LAD), hypertension, hyperlipidemia, stage III chronic kidney disease, cerebrovascular accident, thoracic outlet syndrome and former obesity.   His last LHC performed in August 2022 demonstrated a subtotally occluded mid LAD, 50% stenosis of the ostial ramus, 40% stenosis of the mid RCA, subtotally occluded PDL with collaterals noted from the right coronary artery reconstituting in the distal and apical LAD. He was then referred to CT surgery for evaluation for potential CABG and is following with Dr. Keith Griffin.     He follows up today after cardiac MRI performed near [place] which revealed viability without any prior ischemic damage. There were no late GDE changes to indicate infiltrative process either. Due to these results, Dr. Griffin contacted the patient and recommended to proceed with bypass after the new year. It sounds as if only one bypass will be performed and this will be done off pump. From a symptomatic standpoint, Mr. Philip denies any significant occurrences of angina. He had one small spell while working with his arms overhead, but this  "resolved with rest without nitro.     From a medical management standpoint, he was unable to tolerate Jardiance due to diarrhea, reflux and nausea. He is hesitant to try the medicine again and would prefer to try Farxiga instead. However, this is not on his formulary. His BP is elevated in clinic, but he states this is due to white coat syndrome, usually at home it is very well controlled at 117/70s.   He is tolerating Toprol, Entresto well though. He is not on a loop diuretic and is not on an MRA.     Objective     Visit Vitals  /90   Pulse 56   Ht 172.7 cm (68\")   Wt 90.7 kg (200 lb)   BMI 30.41 kg/m²           Vitals reviewed.   Constitutional:       Appearance: Healthy appearance. Not in distress.   Eyes:      Extraocular Movements: Extraocular movements intact.      Conjunctiva/sclera: Conjunctivae normal.      Pupils: Pupils are equal, round, and reactive to light.   HENT:      Head: Normocephalic and atraumatic.      Nose: Nose normal.    Mouth/Throat:      Lips: Pink.      Mouth: Mucous membranes are moist.      Pharynx: Oropharynx is clear.   Neck:      Vascular: No carotid bruit or JVD. JVD normal.   Pulmonary:      Effort: Pulmonary effort is normal.      Breath sounds: Normal breath sounds.   Chest:      Chest wall: Not tender to palpatation.   Cardiovascular:      PMI at left midclavicular line. Normal rate. Regular rhythm. Normal S1. Normal S2.      Murmurs: There is no murmur.      No gallop. No rub.   Pulses:     Radial: 2+ bilaterally.     Posterior tibial: 2+ bilaterally.  Edema:     Peripheral edema absent.   Abdominal:      General: Bowel sounds are normal.      Palpations: Abdomen is soft.   Musculoskeletal: Normal range of motion.      Extremities: No clubbing present.     Cervical back: Normal range of motion. Skin:     General: Skin is warm and dry.   Neurological:      General: No focal deficit present.      Mental Status: Alert and oriented to person, place, and time.      Cranial " Nerves: Cranial nerves are intact.   Psychiatric:         Attention and Perception: Attention normal.         Mood and Affect: Affect normal.         Speech: Speech normal.         Behavior: Behavior normal.         Cognition and Memory: Cognition normal.             The following portions of the patient's history were reviewed and updated as appropriate: allergies, current medications, past medical history, past social history, past and problem list.     Review of Systems   Constitutional: Negative.    HENT: Negative.    Eyes: Negative.    Respiratory: Positive for chest tightness.    Cardiovascular: Negative.    Gastrointestinal: Negative.    Endocrine: Negative.    Genitourinary: Negative.    Musculoskeletal: Negative.    Skin: Negative.    Allergic/Immunologic: Negative.    Neurological: Negative.    Hematological: Negative.    Psychiatric/Behavioral: Negative.           Echo EF Estimated  Lab Results   Component Value Date    ECHOEFEST 55 04/26/2017         ECG 12 Lead    Date/Time: 11/8/2022 1:18 PM  Performed by: Veena Pedraza PA  Authorized by: Veena Pedraza PA   Comparison: compared with previous ECG from 10/22/2022  Similar to previous ECG  Rhythm: sinus rhythm  Rate: bradycardic  Conduction: left bundle branch block    Clinical impression: abnormal EKG              Medication Review: yes    Current Outpatient Medications:   •  allopurinol (ZYLOPRIM) 100 MG tablet, Take 2 tablets by mouth Daily., Disp: 60 tablet, Rfl: 11  •  aspirin 81 MG tablet, Take 81 mg by mouth Daily., Disp: , Rfl:   •  atorvastatin (LIPITOR) 80 MG tablet, Take 1 tablet by mouth Every Night., Disp: 90 tablet, Rfl: 3  •  calcitriol (ROCALTROL) 0.25 MCG capsule, Take 1 capsule by mouth Daily., Disp: 30 capsule, Rfl: 3  •  clopidogrel (PLAVIX) 75 MG tablet, Take 75 mg by mouth Daily., Disp: , Rfl:   •  isosorbide mononitrate (IMDUR) 30 MG 24 hr tablet, Take 1 tablet by mouth Daily., Disp: 30 tablet, Rfl: 11  •  metoprolol  succinate XL (TOPROL-XL) 25 MG 24 hr tablet, Take 1 tablet by mouth Daily., Disp: 90 tablet, Rfl: 3  •  nitroglycerin (NITROSTAT) 0.4 MG SL tablet, 1 under the tongue as needed for angina, may repeat q5mins for up three doses, Disp: 25 tablet, Rfl: 3  •  pantoprazole (PROTONIX) 40 MG EC tablet, Take 1 tablet by mouth Every Morning., Disp: 30 tablet, Rfl: 3  •  sacubitril-valsartan (Entresto) 24-26 MG tablet, Take 1 tablet by mouth 2 (Two) Times a Day., Disp: 60 tablet, Rfl: 11   No Known Allergies    I have reviewed       CBC:  Lab Results - Last 18 Months   Lab Units 11/08/22  1208   WBC 10*3/mm3 13.36*   HEMOGLOBIN g/dL 13.9   HEMATOCRIT % 43.5   PLATELETS 10*3/mm3 338      BMP/CMP:  Lab Results - Last 18 Months   Lab Units 11/08/22  1208   SODIUM mmol/L 138   POTASSIUM mmol/L 4.3   CHLORIDE mmol/L 103   CO2 mmol/L 26.0   GLUCOSE mg/dL 112*   BUN mg/dL 29*   CREATININE mg/dL 1.68*   CALCIUM mg/dL 9.2     BNP: No results for input(s): PROBNP in the last 35895 hours.   THYROID:No results for input(s): TSH, FREET4, FTI in the last 87209 hours.    Invalid input(s): FREET3, T3, T4, TEUP,  TOTALT4    Results for orders placed during the hospital encounter of 07/05/22    Adult Transthoracic Echo Complete w/ Color, Spectral and Contrast if necessary per protocol    Interpretation Summary  · The left ventricular cavity is mildly dilated.  · Left ventricular ejection fraction appears to be 36 - 40%. Left ventricular systolic function is moderately decreased.  · The following left ventricular wall segments are akinetic: apical anterior, apical lateral, apical inferior, apical septal, apex and mid anteroseptal.  · Left ventricular diastolic function is consistent with (grade I) impaired relaxation.  · Left atrial volume is mildly increased.  · Estimated right ventricular systolic pressure from tricuspid regurgitation is normal (<35 mmHg).  · There is a trivial pericardial effusion. The effusion is fluid filled. There is no  evidence of cardiac tamponade.  · Compared to echo dated 4/29/2017 LVEF has dropped from 60% to 36-40% with new wall motion abnormalities     Assessment:   Diagnoses and all orders for this visit:    1. Stage 3b chronic kidney disease (HCC) (Primary)  -     Basic Metabolic Panel; Future    2. Chronic combined systolic and diastolic heart failure (HCC)    3. Coronary artery disease involving native coronary artery of native heart without angina pectoris    4. Hyperlipidemia LDL goal <70    Other orders  -     atorvastatin (LIPITOR) 80 MG tablet; Take 1 tablet by mouth Every Night.  Dispense: 90 tablet; Refill: 3      HLP: Will increase Lipitor to 80mg daily for goal LDL of <70. Recent LDL was 118.     HFrEF: Repeated BMP today, Creatinine better at 1.6, K+4.3, GFR 42. Will start Spironolactone 12.5mg daily.   Beta blocker: metoprolol  ARNI: Entresto   Loop: none  MRA: Begin low dose Balke today. Patient's wife advised they must get BMP in one week.   SGLT2: Intolerant to Jardiance due to GI symptoms. Will try to get Farxiga approved.     CAD: patient states that Dr. Griffin recommended to proceed with CABG. Continue DAPT for now until December prior to surgery. No recent angina.     I spent 30 minutes caring for Gallo on this date of service. This time includes time spent by me in the following activities:preparing for the visit, reviewing tests, obtaining and/or reviewing a separately obtained history, performing a medically appropriate examination and/or evaluation , counseling and educating the patient/family/caregiver, ordering medications, tests, or procedures, referring and communicating with other health care professionals  and documenting information in the medical record        Electronically signed by ALEK Dobbins

## 2022-11-08 NOTE — TELEPHONE ENCOUNTER
Caller: David Philip    Relationship: Emergency Contact    Best call back number: [phone]    What is the best time to reach you: ANYTIME    Who are you requesting to speak with (clinical staff, provider,  specific staff member): CLINICAL     Do you know the name of the person who called: DAVID    What was the call regarding: SPOKE WITH DR ANDERSON LAST WEEK ABOUT HAVING SURGERY. THEY ARE WANTING TO KNOW WHICH ARTERY HE WILL BE HAVING SURGERY ON.     Do you require a callback: YES

## 2022-11-10 ENCOUNTER — TELEPHONE (OUTPATIENT)
Dept: CARDIAC SURGERY | Facility: CLINIC | Age: 77
End: 2022-11-10

## 2022-11-10 NOTE — TELEPHONE ENCOUNTER
Wife calling to see if they can get an appt with Dr Griffin on a Monday afternoon (late as possible) as they have more questions re: surgery.  Can reach her at #[phone]/geraldine

## 2022-11-21 ENCOUNTER — TELEPHONE (OUTPATIENT)
Dept: CARDIAC SURGERY | Facility: CLINIC | Age: 77
End: 2022-11-21

## 2022-11-21 NOTE — TELEPHONE ENCOUNTER
There are not any labs ordered from our providers for patient. If Dr Griffin decides he needs any labs the day of appt, he can get them at our facility day of.

## 2022-11-21 NOTE — TELEPHONE ENCOUNTER
Caller: Mitzy Philip    Relationship: Emergency Contact    Best call back number: [phone]    Who are you requesting to speak with (clinical staff, provider,  specific staff member): CLINICAL     What was the call regarding: PATIENTS WIFE WAS UNSURE IF HER  WAS DIRECTED TO GET BLOOD WORK DONE BEFORE NEXT APPOINTMENT. IF SO SHED LIKE THE ORDER SENT TO [facility].     Do you require a callback: YES PLEASE

## 2022-11-28 ENCOUNTER — OFFICE VISIT (OUTPATIENT)
Dept: CARDIAC SURGERY | Facility: CLINIC | Age: 77
End: 2022-11-28

## 2022-11-28 VITALS
BODY MASS INDEX: 30.07 KG/M2 | SYSTOLIC BLOOD PRESSURE: 123 MMHG | OXYGEN SATURATION: 97 % | HEIGHT: 68 IN | WEIGHT: 198.4 LBS | HEART RATE: 64 BPM | DIASTOLIC BLOOD PRESSURE: 82 MMHG

## 2022-11-28 DIAGNOSIS — I25.10 CORONARY ARTERY DISEASE INVOLVING NATIVE CORONARY ARTERY OF NATIVE HEART WITHOUT ANGINA PECTORIS: Primary | ICD-10-CM

## 2022-11-28 DIAGNOSIS — I50.42 CHRONIC COMBINED SYSTOLIC AND DIASTOLIC HEART FAILURE: ICD-10-CM

## 2022-11-28 PROCEDURE — 99214 OFFICE O/P EST MOD 30 MIN: CPT | Performed by: SURGERY

## 2022-11-28 RX ORDER — FEBUXOSTAT 40 MG/1
40 TABLET, FILM COATED ORAL DAILY
COMMUNITY

## 2022-11-29 ENCOUNTER — TELEPHONE (OUTPATIENT)
Dept: CARDIAC SURGERY | Facility: CLINIC | Age: 77
End: 2022-11-29

## 2022-11-29 ENCOUNTER — PREP FOR SURGERY (OUTPATIENT)
Dept: OTHER | Facility: HOSPITAL | Age: 77
End: 2022-11-29

## 2022-11-29 DIAGNOSIS — I25.10 CORONARY ARTERY DISEASE INVOLVING NATIVE CORONARY ARTERY OF NATIVE HEART WITHOUT ANGINA PECTORIS: Primary | ICD-10-CM

## 2022-11-29 DIAGNOSIS — R06.02 SOB (SHORTNESS OF BREATH): ICD-10-CM

## 2022-11-29 RX ORDER — SODIUM CHLORIDE 9 MG/ML
40 INJECTION, SOLUTION INTRAVENOUS AS NEEDED
Status: CANCELLED | OUTPATIENT
Start: 2022-11-29

## 2022-11-29 RX ORDER — DEXTROSE MONOHYDRATE 25 G/50ML
10-50 INJECTION, SOLUTION INTRAVENOUS
Status: CANCELLED | OUTPATIENT
Start: 2022-11-29

## 2022-11-29 RX ORDER — SODIUM CHLORIDE 0.9 % (FLUSH) 0.9 %
10 SYRINGE (ML) INJECTION AS NEEDED
Status: CANCELLED | OUTPATIENT
Start: 2022-11-29

## 2022-11-29 RX ORDER — ACETAMINOPHEN 500 MG
1000 TABLET ORAL ONCE
Status: CANCELLED | OUTPATIENT
Start: 2023-01-03

## 2022-11-29 RX ORDER — SODIUM CHLORIDE 0.9 % (FLUSH) 0.9 %
30 SYRINGE (ML) INJECTION ONCE AS NEEDED
Status: CANCELLED | OUTPATIENT
Start: 2022-11-29

## 2022-11-29 RX ORDER — SODIUM CHLORIDE 9 MG/ML
30 INJECTION, SOLUTION INTRAVENOUS CONTINUOUS PRN
Status: CANCELLED | OUTPATIENT
Start: 2022-11-29

## 2022-11-29 RX ORDER — BUPIVACAINE HCL/0.9 % NACL/PF 0.1 %
2 PLASTIC BAG, INJECTION (ML) EPIDURAL ONCE
Status: CANCELLED | OUTPATIENT
Start: 2023-01-03

## 2022-11-29 RX ORDER — SODIUM CHLORIDE 0.9 % (FLUSH) 0.9 %
10 SYRINGE (ML) INJECTION EVERY 12 HOURS SCHEDULED
Status: CANCELLED | OUTPATIENT
Start: 2022-11-29

## 2022-11-29 RX ORDER — NICOTINE POLACRILEX 4 MG
15 LOZENGE BUCCAL
Status: CANCELLED | OUTPATIENT
Start: 2022-11-29

## 2022-11-29 NOTE — TELEPHONE ENCOUNTER
Surgery orders are in for 1/3/2023.  Please call patient with prework information.  Last dose of Plavix should be taken on 12/28/2022.  Please confirm that he does not take any anticoagulation.

## 2022-11-29 NOTE — PROGRESS NOTES
"    [facility] Emergency Department Cardiothoracic Surgery  PROGRESS NOTE   CC: Severe coronary disease with decreased LVEF    Subjective:  Mr. Philip is a 77-year-old male who first evaluated in hospital after having coronary angiography.  This showed occlusion of mid LAD with distal collateral filling.  He has a history of chronic kidney disease with baseline creatinine around 2-2.2.  We discussed at that time proceeding with coronary artery bypass grafting, since then he has had viability via MRI which shows excellent viability in the distal LAD distribution.  He wanted to further discuss surgical options with me today.  He is overall doing well, he does have continued class I NYHA heart failure symptoms but no significant angina.  No recent illnesses.    ROS: No anginal type chest pain, hemodynamically stable, no fevers or chills or recent illnesses    Objective:      /82 (BP Location: Right arm, Patient Position: Sitting, Cuff Size: Adult)   Pulse 64   Ht 172.7 cm (68\")   Wt 90 kg (198 lb 6.4 oz)   SpO2 97%   BMI 30.17 kg/m²     PE:  Vitals:    11/28/22 1539   BP: 123/82   Pulse: 64   SpO2: 97%     GENERAL: NAD, resting comfortably, normal color  CARDIOVASCULAR: regular, regular rate, sinus  PULMONARY: Normal bilateral breath sounds, no labored breathing  ABDOMEN: soft, nontender/nondistended  EXTREMITIES: mild peripheral edema, normal pulses, normal ROM        Lab Results (last 72 hours)     ** No results found for the last 72 hours. **        Cath:  Normal left main coronary artery  Mid left anterior descending coronary artery subtotally occluded at the bifurcation site with a moderate sized second diagonal branch  After the subtotal occlusion in the mid left anterior descending coronary artery is fairly well visualized and is satisfactory for grafting ramus branch with approximately 50% stenosis in the ostial segment  No high-grade obstructive disease of the left circumflex coronary artery " noted  Moderate atherosclerotic changes with some aneurysmal dilatation of the proximal right coronary artery  Midportion has approximately a 40% stenosis  No significant disease of the right posterior descending coronary artery  Subtotally occluded proximal moderate-sized right posterior lateral artery  Collaterals noted from the right coronary artery reconstituting in the distal and apical left anterior descending coronary artery  Patent bilateral internal mammary arteries that can be used as a surgical conduit  Mildly elevated left ventricular end-diastolic pressure 16 mmHg  Left ventriculogram not performed to save contrast due to stage IIIb renal failure        ____________________________________________________________________________________________________________________________________________     Plan after cardiac catheterization     Will refer for CT surgery evaluation and consideration for possible aortocoronary bypass surgery  May require work-up for viable hibernating myocardium  Given shortage of thallium may consider gadolinium enhanced cardiac MRI however given renal failure this may not be feasible  Will defer to surgery whether aortocoronary bypass surgery is feasible/advisable in the above scenario  Patient will be admitted for gentle hydration given renal failure  Usual post procedure precautions after arteriotomy including monitoring for signs both local and systemic side effects.  On ultimate discharge will receive printed instructions  Intensive risk factor modifications for both primary and secondary prevention if applicable  Hydration   Observation    ECHO:  Interpretation Summary    • The left ventricular cavity is mildly dilated.  • Left ventricular ejection fraction appears to be 36 - 40%. Left ventricular systolic function is moderately decreased.  • The following left ventricular wall segments are akinetic: apical anterior, apical lateral, apical inferior, apical septal, apex and  mid anteroseptal.  • Left ventricular diastolic function is consistent with (grade I) impaired relaxation.  • Left atrial volume is mildly increased.  • Estimated right ventricular systolic pressure from tricuspid regurgitation is normal (<35 mmHg).  • There is a trivial pericardial effusion. The effusion is fluid filled. There is no evidence of cardiac tamponade.  • Compared to echo dated 4/29/2017 LVEF has dropped from 60% to 36-40% with new wall motion abnormalities      Cardiac MRI:      Assessment & Plan     Mr. Philip is a 77-year-old male who presented to me originally as an inpatient with  of the mid LAD with distal collateral filling, decreased LVEF around 36 to 40%, chronic kidney disease with baseline creatinine just above 2.  We are concerned about viability of the distal LAD distribution, he is undergone MRI viability testing which shows excellent viability of the LV past this.  I discussed with him the risk and benefits of proceeding with coronary bypass grafting, he wished to proceed but wants to return now to further discuss.    Today we again discussed the natural history of coronary disease especially in the setting of decreased LVEF such as his.  We discussed the mortality advantage and poor options for PCI given the  nature of his LAD.  We discussed the comorbid condition of chronic kidney disease stage IIIb and how this increases the risk of postoperative renal failure, he understands.  I discussed with him the steps that I would plan to take to try to minimize this risk including possibly off-pump single-vessel bypass to the distal LAD versus on pump beating.  He likes this idea.  We also discussed operative approaches and he was questioning whether I would be willing to perform minimally invasive approach through a thoracotomy, I discussed at length with him this and my standard approach as median sternotomy he is okay with this and wishes to proceed this way.    We discussed the risk of  surgery including but not limited to bleeding, infection, injury to major organs or vessels, chronic heart failure, arrhythmias, need for pacemaker, prolonged ventilation, renal failure, stroke, risk of anesthesia, risk of sternal wound or bone healing problems, reoperation, and/or death.  I calculate his patient specific STS risk were communicated this with him, he understands and agrees to proceed with surgery.    Thank you for trusting me with the care of Mr. Philip.  Please do not hesitate to call with questions or concerns.    Keith Griffin MD  Cardiothoracic Surgeon

## 2022-12-05 ENCOUNTER — TELEPHONE (OUTPATIENT)
Dept: CARDIOLOGY | Facility: CLINIC | Age: 77
End: 2022-12-05

## 2022-12-05 DIAGNOSIS — Z20.822 ENCOUNTER FOR PREOPERATIVE SCREENING LABORATORY TESTING FOR COVID-19 VIRUS: Primary | ICD-10-CM

## 2022-12-05 DIAGNOSIS — Z01.812 ENCOUNTER FOR PREOPERATIVE SCREENING LABORATORY TESTING FOR COVID-19 VIRUS: Primary | ICD-10-CM

## 2022-12-05 RX ORDER — CALCITRIOL 0.25 UG/1
0.25 CAPSULE, LIQUID FILLED ORAL DAILY
Qty: 30 CAPSULE | Refills: 3 | Status: SHIPPED | OUTPATIENT
Start: 2022-12-05

## 2022-12-05 RX ORDER — CALCITRIOL 0.25 UG/1
0.25 CAPSULE, LIQUID FILLED ORAL DAILY
Qty: 30 CAPSULE | Refills: 3 | Status: CANCELLED | OUTPATIENT
Start: 2022-12-05

## 2022-12-05 NOTE — TELEPHONE ENCOUNTER
Caller: Mitzy Philip     Relationship: SPOUSE    Best call back number: [phone]    What is your medical concern? PT IS HAVING SEVERE GOUT ATTACKS THAT ARE AFFECTING HIS DAILY LIFE - PT REPORTS THAT HE READ UP ON ENTRESTO AND SAW THAT CAN CAUSE GOUT AND PT WANTING TO SEE ABOUT SWITCHING - PT HAS HX OF GOUT AND DOES NOT EAT SERNA OR SAUSAGE AND WATCHES WHAT HE CONSUMES - PT WIFE IS AT WORK BUT IS TAKING PHONE TO TAKE CALL - UNABLE TO CONNECT TO OFFICE

## 2022-12-13 ENCOUNTER — TELEPHONE (OUTPATIENT)
Dept: CARDIAC SURGERY | Facility: CLINIC | Age: 77
End: 2022-12-13

## 2022-12-13 NOTE — TELEPHONE ENCOUNTER
Called and informed wife.  She will contact company to have this faxed.  Office fax number provided to her./geraldine

## 2022-12-13 NOTE — TELEPHONE ENCOUNTER
Caller: Mitzy Philip    Relationship: Emergency Contact    Best call back number:[phone]    What form or medical record are you requesting: [employer]    Who is requesting this form or medical record from you: WORK- [employer]    How would you like to receive the form or medical records (pick-up, mail, fax): FAX  If fax, what is the fax number: [phone]    Timeframe paperwork needed: ASAP PT SPOUSE STATED THEY ARE PUTTING HER OFF WORK ON 1/3-1/23 FOR HER 'S OPEN HEART SURGERY.     Additional notes:PLEASE CONTACT PT SPOUSE IF ADDITIONAL INFO IS NEEDED    PT STATED CASE NUMBER IS [id number]    SHE SAID SHE WAS UNSURE IF THAT WOULD BE NEEDED.

## 2022-12-14 NOTE — TELEPHONE ENCOUNTER
Caller: Mitzy Philip    Relationship: Emergency Contact    Best call back number: [phone]    Requested Prescriptions:   Requested Prescriptions     Pending Prescriptions Disp Refills   • pantoprazole (PROTONIX) 40 MG EC tablet 30 tablet 3     Sig: Take 1 tablet by mouth Every Morning.        Pharmacy where request should be sent:  [facility] IN [place]    Additional details provided by patient: PT IS COMPLETELY OUT OF THIS MEDICATION. THIS REFILL REQUEST WAS ORIGINALLY SUBMITTED ON 12.12.22 AND THE PT HAS NOT RECEIVED A RESPONSE TO IT.    Does the patient have less than a 3 day supply:  [x] Yes  [] No    Would you like a call back once the refill request has been completed: [x] Yes [] No    If the office needs to give you a call back, can they leave a voicemail: [x] Yes [] No    Adan Rosado Rep   12/14/22 13:40 CST

## 2022-12-15 RX ORDER — PANTOPRAZOLE SODIUM 40 MG/1
40 TABLET, DELAYED RELEASE ORAL
Qty: 30 TABLET | Refills: 3 | OUTPATIENT
Start: 2022-12-15

## 2022-12-16 NOTE — TELEPHONE ENCOUNTER
Caller: Mitzy Philip    Relationship: Emergency Contact    Best call back number: [phone]    Requested Prescriptions:   Requested Prescriptions     Pending Prescriptions Disp Refills   • pantoprazole (PROTONIX) 40 MG EC tablet 30 tablet 3     Sig: Take 1 tablet by mouth Every Morning.        Pharmacy where request should be sent: [facility] [address] [place] [address] [phone] [facility] [phone]      Additional details provided by patient: PATIENT HAS NOT TAKEN THIS MEDICINE SINCE Monday     Does the patient have less than a 3 day supply:  [x] Yes  [] No    Would you like a call back once the refill request has been completed: [x] Yes [] No    If the office needs to give you a call back, can they leave a voicemail: [x] Yes [] No    Adan Edward Rep   12/16/22 15:33 CST

## 2022-12-17 RX ORDER — PANTOPRAZOLE SODIUM 40 MG/1
40 TABLET, DELAYED RELEASE ORAL
Qty: 30 TABLET | Refills: 3 | Status: SHIPPED | OUTPATIENT
Start: 2022-12-17

## 2022-12-20 NOTE — TELEPHONE ENCOUNTER
"Called pt's wife and informed her that we have not received the paperwork yet. Confirmed fax number with her. She states she called [employer] prior to me calling her and they are faxing it again today and to \"give it 24 hours.\" Informed her that we will watch our faxes for this paperwork. Pt's wife requests we contact her at [phone] once we receive the paperwork. She states she has until 01/22/2023 to turn this paperwork in to receive FMLA. Asked if she is able to  paperwork and bring to our office or fax to our office if we are unable to receive this from [employer] and she states she does not know if she will have time because [place] is coming up. I informed her again that we will watch for the paperwork and call her once received. She voiced understanding.  "

## 2022-12-27 ENCOUNTER — TELEPHONE (OUTPATIENT)
Dept: CARDIAC SURGERY | Facility: CLINIC | Age: 77
End: 2022-12-27

## 2022-12-27 NOTE — TELEPHONE ENCOUNTER
Advise patient to notify pcp and see if he can be seen to make sure he does not have an infection or virus. He will be covid tested prior to surgery.

## 2022-12-27 NOTE — TELEPHONE ENCOUNTER
Wife calling.  Pt is sched for surgery next week with Dr Griffin and has preop testing this Friday.  Pt is c/o of a dry cough.  No fever, congestion, runny nose, etc.  Pt is not sure if this is from medication or if this is something else.  Wanted to make Dr Griffin aware and see if there is anything they need to do.  Can reach them at #[phone]/kah

## 2022-12-27 NOTE — TELEPHONE ENCOUNTER
Patient's wife called in to see if the paperwork has been filled out and if it has been faxed back yet.

## 2022-12-30 ENCOUNTER — PRE-ADMISSION TESTING (OUTPATIENT)
Dept: PREADMISSION TESTING | Facility: HOSPITAL | Age: 77
End: 2022-12-30
Payer: MEDICARE

## 2022-12-30 ENCOUNTER — HOSPITAL ENCOUNTER (OUTPATIENT)
Dept: GENERAL RADIOLOGY | Facility: HOSPITAL | Age: 77
Discharge: HOME | End: 2022-12-30
Payer: MEDICARE

## 2022-12-30 ENCOUNTER — ANESTHESIA EVENT (OUTPATIENT)
Dept: PERIOP | Facility: HOSPITAL | Age: 77
DRG: 236 | End: 2022-12-30
Payer: MEDICARE

## 2022-12-30 VITALS
DIASTOLIC BLOOD PRESSURE: 73 MMHG | OXYGEN SATURATION: 97 % | SYSTOLIC BLOOD PRESSURE: 140 MMHG | HEIGHT: 70 IN | RESPIRATION RATE: 16 BRPM | BODY MASS INDEX: 29.01 KG/M2 | WEIGHT: 202.6 LBS | HEART RATE: 63 BPM

## 2022-12-30 DIAGNOSIS — Z01.812 ENCOUNTER FOR PREOPERATIVE SCREENING LABORATORY TESTING FOR COVID-19 VIRUS: ICD-10-CM

## 2022-12-30 DIAGNOSIS — I25.10 CORONARY ARTERY DISEASE INVOLVING NATIVE CORONARY ARTERY OF NATIVE HEART WITHOUT ANGINA PECTORIS: ICD-10-CM

## 2022-12-30 DIAGNOSIS — Z20.822 ENCOUNTER FOR PREOPERATIVE SCREENING LABORATORY TESTING FOR COVID-19 VIRUS: ICD-10-CM

## 2022-12-30 LAB
ABO GROUP BLD: NORMAL
ALBUMIN SERPL-MCNC: 3.9 G/DL (ref 3.5–5.2)
ALBUMIN/GLOB SERPL: 1.4 G/DL
ALP SERPL-CCNC: 86 U/L (ref 39–117)
ALT SERPL W P-5'-P-CCNC: 19 U/L (ref 1–41)
ANION GAP SERPL CALCULATED.3IONS-SCNC: 12 MMOL/L (ref 5–15)
APTT PPP: 24.5 SECONDS (ref 24.1–35)
ARTERIAL PATENCY WRIST A: ABNORMAL
AST SERPL-CCNC: 18 U/L (ref 1–40)
ATMOSPHERIC PRESS: 749 MMHG
BASE EXCESS BLDA CALC-SCNC: -1.6 MMOL/L (ref 0–2)
BASOPHILS # BLD AUTO: 0.1 10*3/MM3 (ref 0–0.2)
BASOPHILS NFR BLD AUTO: 1 % (ref 0–1.5)
BDY SITE: ABNORMAL
BILIRUB SERPL-MCNC: 0.4 MG/DL (ref 0–1.2)
BLD GP AB SCN SERPL QL: NEGATIVE
BODY TEMPERATURE: 37 C
BUN SERPL-MCNC: 25 MG/DL (ref 8–23)
BUN/CREAT SERPL: 13.4 (ref 7–25)
CALCIUM SPEC-SCNC: 9.1 MG/DL (ref 8.6–10.5)
CHLORIDE SERPL-SCNC: 104 MMOL/L (ref 98–107)
CO2 SERPL-SCNC: 24 MMOL/L (ref 22–29)
CREAT SERPL-MCNC: 1.86 MG/DL (ref 0.76–1.27)
DEPRECATED RDW RBC AUTO: 49.1 FL (ref 37–54)
EGFRCR SERPLBLD CKD-EPI 2021: 37.3 ML/MIN/1.73
EOSINOPHIL # BLD AUTO: 1.05 10*3/MM3 (ref 0–0.4)
EOSINOPHIL NFR BLD AUTO: 10.4 % (ref 0.3–6.2)
ERYTHROCYTE [DISTWIDTH] IN BLOOD BY AUTOMATED COUNT: 13.9 % (ref 12.3–15.4)
GLOBULIN UR ELPH-MCNC: 2.8 GM/DL
GLUCOSE SERPL-MCNC: 117 MG/DL (ref 65–99)
HCO3 BLDA-SCNC: 22.5 MMOL/L (ref 20–26)
HCT VFR BLD AUTO: 41.1 % (ref 37.5–51)
HGB BLD-MCNC: 13.2 G/DL (ref 13–17.7)
IMM GRANULOCYTES # BLD AUTO: 0.08 10*3/MM3 (ref 0–0.05)
IMM GRANULOCYTES NFR BLD AUTO: 0.8 % (ref 0–0.5)
INR PPP: 0.92 (ref 0.91–1.09)
LYMPHOCYTES # BLD AUTO: 2.91 10*3/MM3 (ref 0.7–3.1)
LYMPHOCYTES NFR BLD AUTO: 28.7 % (ref 19.6–45.3)
Lab: ABNORMAL
MCH RBC QN AUTO: 30.6 PG (ref 26.6–33)
MCHC RBC AUTO-ENTMCNC: 32.1 G/DL (ref 31.5–35.7)
MCV RBC AUTO: 95.4 FL (ref 79–97)
MODALITY: ABNORMAL
MONOCYTES # BLD AUTO: 0.93 10*3/MM3 (ref 0.1–0.9)
MONOCYTES NFR BLD AUTO: 9.2 % (ref 5–12)
NEUTROPHILS NFR BLD AUTO: 49.9 % (ref 42.7–76)
NEUTROPHILS NFR BLD AUTO: 5.07 10*3/MM3 (ref 1.7–7)
NRBC BLD AUTO-RTO: 0 /100 WBC (ref 0–0.2)
PCO2 BLDA: 35.3 MM HG (ref 35–45)
PCO2 TEMP ADJ BLD: 35.3 MM HG (ref 35–45)
PH BLDA: 7.41 PH UNITS (ref 7.35–7.45)
PH, TEMP CORRECTED: 7.41 PH UNITS (ref 7.35–7.45)
PLATELET # BLD AUTO: 275 10*3/MM3 (ref 140–450)
PMV BLD AUTO: 11 FL (ref 6–12)
PO2 BLDA: 92.7 MM HG (ref 83–108)
PO2 TEMP ADJ BLD: 92.7 MM HG (ref 83–108)
POTASSIUM SERPL-SCNC: 4 MMOL/L (ref 3.5–5.2)
PROT SERPL-MCNC: 6.7 G/DL (ref 6–8.5)
PROTHROMBIN TIME: 12.5 SECONDS (ref 11.8–14.8)
RBC # BLD AUTO: 4.31 10*6/MM3 (ref 4.14–5.8)
RH BLD: POSITIVE
SAO2 % BLDCOA: 98.1 % (ref 94–99)
SARS-COV-2 RNA RESP QL NAA+PROBE: NOT DETECTED
SODIUM SERPL-SCNC: 140 MMOL/L (ref 136–145)
T&S EXPIRATION DATE: NORMAL
VENTILATOR MODE: ABNORMAL
WBC NRBC COR # BLD: 10.14 10*3/MM3 (ref 3.4–10.8)

## 2022-12-30 PROCEDURE — 86850 RBC ANTIBODY SCREEN: CPT

## 2022-12-30 PROCEDURE — C9803 HOPD COVID-19 SPEC COLLECT: HCPCS

## 2022-12-30 PROCEDURE — 85025 COMPLETE CBC W/AUTO DIFF WBC: CPT

## 2022-12-30 PROCEDURE — 86901 BLOOD TYPING SEROLOGIC RH(D): CPT

## 2022-12-30 PROCEDURE — 85610 PROTHROMBIN TIME: CPT

## 2022-12-30 PROCEDURE — 87635 SARS-COV-2 COVID-19 AMP PRB: CPT

## 2022-12-30 PROCEDURE — 86900 BLOOD TYPING SEROLOGIC ABO: CPT

## 2022-12-30 PROCEDURE — 85730 THROMBOPLASTIN TIME PARTIAL: CPT

## 2022-12-30 PROCEDURE — 71046 X-RAY EXAM CHEST 2 VIEWS: CPT

## 2022-12-30 PROCEDURE — 36415 COLL VENOUS BLD VENIPUNCTURE: CPT

## 2022-12-30 PROCEDURE — 93005 ELECTROCARDIOGRAM TRACING: CPT

## 2022-12-30 PROCEDURE — 82803 BLOOD GASES ANY COMBINATION: CPT | Performed by: SURGERY

## 2022-12-30 PROCEDURE — 93010 ELECTROCARDIOGRAM REPORT: CPT | Performed by: EMERGENCY MEDICINE

## 2022-12-30 PROCEDURE — 80053 COMPREHEN METABOLIC PANEL: CPT

## 2022-12-30 PROCEDURE — 36600 WITHDRAWAL OF ARTERIAL BLOOD: CPT | Performed by: SURGERY

## 2022-12-30 NOTE — DISCHARGE INSTRUCTIONS
Before you come to the hospital        Arrival time: AS DIRECTED BY OFFICE     YOU MAY TAKE THE FOLLOWING MEDICATION(S) THE MORNING OF SURGERY WITH A SIP OF WATER: NONE      Do not take any Erectile Dysfunction medications (EX: CIALIS, VIAGRA) 24 hours prior to surgery.      If you were given and instructed to use a germ- killing soap, use as directed the night before surgery and again the morning of surgery or as directed by your surgeon. (Use one-half of the bottle with each shower.)   See attached information for How to Use Chlorhexidine for Bathing if applicable.              MANAGING PAIN AFTER SURGERY    We know you are probably wondering what your pain will be like after surgery.  Following surgery it is unrealistic to expect you will not have pain.   Pain is how our bodies let us know that something is wrong or cautions us to be careful.  That said, our goal is to make your pain tolerable.    Methods we may use to treat your pain include (oral or IV medications, PCAs, epidurals, nerve blocks, etc.)   While some procedures require IV pain medications for a short time after surgery, transitioning to pain medications by mouth allows for better management of pain.   Your nurse will encourage you to take oral pain medications whenever possible.  IV medications work almost immediately, but only last a short while.  Taking medications by mouth allows for a more constant level of medication in your blood stream for a longer period of time.      Once your pain is out of control it is harder to get back under control.  It is important you are aware when your next dose of pain medication is due.  If you are admitted, your nurse may write the time of your next dose on the white board in your room to help you remember.      We are interested in your pain and encourage you to inform us about aggravating factors during your visit.   Many times a simple repositioning every few hours can make a big difference.    If your  physician says it is okay, do not let your pain prevent you from getting out of bed. Be sure to call your nurse for assistance prior to getting up so you do not fall.      Before surgery, please decide your tolerable pain goal.  These faces help describe the pain ratings we use on a 0-10 scale.   Be prepared to tell us your goal and whether or not you take pain or anxiety medications at home.          Preparing for Surgery  Preparing for surgery is an important part of your care. It can make things go more smoothly and help you avoid complications. The steps leading up to surgery may vary among hospitals. Follow all instructions given to you by your health care providers. Ask questions if you do not understand something. Talk about any concerns that you have.  Here are some questions to consider asking before your surgery:  If my surgery is not an emergency (is elective), when would be the best time to have the surgery?  What arrangements do I need to make for work, home, or school?  What will my recovery be like? How long will it be before I can return to normal activities?  Will I need to prepare my home? Will I need to arrange care for me or my children?  Should I expect to have pain after surgery? What are my pain management options? Are there nonmedical options that I can try for pain?  Tell a health care provider about:  Any allergies you have.  All medicines you are taking, including vitamins, herbs, eye drops, creams, and over-the-counter medicines.  Any problems you or family members have had with anesthetic medicines.  Any blood disorders you have.  Any surgeries you have had.  Any medical conditions you have.  Whether you are pregnant or may be pregnant.  What are the risks?  The risks and complications of surgery depend on the specific procedure that you have. Discuss all the risks with your health care providers before your surgery. Ask about common surgical complications, which may  include:  Infection.  Bleeding or a need for blood replacement (transfusion).  Allergic reactions to medicines.  Damage to surrounding nerves, tissues, or structures.  A blood clot.  Scarring.  Failure of the surgery to correct the problem.  Follow these instructions before the procedure:  Several days or weeks before your procedure  You may have a physical exam by your primary health care provider to make sure it is safe for you to have surgery.  You may have testing. This may include a chest X-ray, blood and urine tests, electrocardiogram (ECG), or other testing.  Ask your health care provider about:  Changing or stopping your regular medicines. This is especially important if you are taking diabetes medicines or blood thinners.  Taking medicines such as aspirin and ibuprofen. These medicines can thin your blood. Do not take these medicines unless your health care provider tells you to take them.  Taking over-the-counter medicines, vitamins, herbs, and supplements.  Do not use any products that contain nicotine or tobacco, such as cigarettes and e-cigarettes. If you need help quitting, ask your health care provider.  Avoid alcohol.  Ask your health care provider if there are exercises you can do to prepare for surgery.  Eat a healthy diet.   Plan to have someone take you home from the hospital or clinic.  Plan to have a responsible adult care for you for at least 24 hours after you leave the hospital or clinic. This is important.  The day before your procedure  You may be given antibiotic medicine to take by mouth to help prevent infection. Take it as told by your health care provider.  You may be asked to shower with a germ-killing soap.  Follow instructions from your health care provider about eating and drinking restrictions. This includes gum, mints and hard candy.  Pack comfortable clothes according to your procedure.   The day of your procedure  You may need to take another shower with a germ-killing soap  before you leave home in the morning.  With a small sip of water, take only the medicines that you are told to take.  Remove all jewelry including rings.   Leave anything you consider valuable at home except hearing aids if needed.  You do not need to bring your home medications into the hospital.   Do not wear any makeup, nail polish, powder, deodorant, lotion, hair accessories, or anything on your skin or body except your clothes.  If you will be staying in the hospital, bring a case to hold your glasses, contacts, or dentures. You may also want to bring your robe and non-skid footwear.       (Do not use denture adhesives since you will be asked to remove them during  surgery).   If you wear oxygen at home, bring it with you the day of surgery.  If instructed by your health care provider, bring your sleep apnea device with you on the day of your surgery (if this applies to you).  You may want to leave your suitcase and sleep apnea device in the car until after surgery.   Arrive at the hospital as scheduled.  Bring a friend or family member with you who can help to answer questions and be present while you meet with your health care provider.  At the hospital  When you arrive at the hospital:  Go to registration located at the main entrance of the hospital. You will be registered and given a beeper and a sticker sheet. Take the stickers to the Outpatient nurses desk and place in the black tray. This is to notify staff that you have arrived. Then return to the lobby to wait.   When your beeper lights up and vibrates proceed through the double doors, under the stairs, and a member of the Outpatient Surgery staff will escort you to your preoperative room.  You may have to wear compression sleeves. These help to prevent blood clots and reduce swelling in your legs.  An IV may be inserted into one of your veins.              In the operating room, you may be given one or more of the following:        A medicine to help  you relax (sedative).        A medicine to numb the area (local anesthetic).        A medicine to make you fall asleep (general anesthetic).        A medicine that is injected into an area of your body to numb everything below the                      injection site (regional anesthetic).  You may be given an antibiotic through your IV to help prevent infection.  Your surgical site will be marked or identified.    Contact a health care provider if you:  Develop a fever of more than 100.4°F (38°C) or other feelings of illness during the 48 hours before your surgery.  Have symptoms that get worse.  Have questions or concerns about your surgery.  Summary  Preparing for surgery can make the procedure go more smoothly and lower your risk of complications.  Before surgery, make a list of questions and concerns to discuss with your surgeon. Ask about the risks and possible complications.  In the days or weeks before your surgery, follow all instructions from your health care provider. You may need to stop smoking, avoid alcohol, follow eating restrictions, and change or stop your regular medicines.  Contact your surgeon if you develop a fever or other signs of illness during the few days before your surgery.  This information is not intended to replace advice given to you by your health care provider. Make sure you discuss any questions you have with your health care provider.  Document Revised: 12/21/2018 Document Reviewed: 10/23/2018  [employer] Patient Education © 2021 [employer]

## 2022-12-30 NOTE — ANESTHESIA PREPROCEDURE EVALUATION
Anesthesia Evaluation     Patient summary reviewed   no history of anesthetic complications:               Airway   Mallampati: II  TM distance: >3 FB  No difficulty expected  Dental    (+) partials    Pulmonary    (+) shortness of breath,   (-) asthma, not a smoker  Cardiovascular   Exercise tolerance: poor (<4 METS)    (+) hypertension, CAD, dysrhythmias, CHF , hyperlipidemia,     ROS comment: Cardiac cath:  Conclusion of cardiac catheterization    8/5/2022     Normal left main coronary artery  Mid left anterior descending coronary artery subtotally occluded at the bifurcation site with a moderate sized second diagonal branch  After the subtotal occlusion in the mid left anterior descending coronary artery is fairly well visualized and is satisfactory for grafting ramus branch with approximately 50% stenosis in the ostial segment  No high-grade obstructive disease of the left circumflex coronary artery noted  Moderate atherosclerotic changes with some aneurysmal dilatation of the proximal right coronary artery  Midportion has approximately a 40% stenosis  No significant disease of the right posterior descending coronary artery  Subtotally occluded proximal moderate-sized right posterior lateral artery  Collaterals noted from the right coronary artery reconstituting in the distal and apical left anterior descending coronary artery  Patent bilateral internal mammary arteries that can be used as a surgical conduit  Mildly elevated left ventricular end-diastolic pressure 16 mmHg  Left ventriculogram not performed to save contrast due to stage IIIb renal failure     Echo:  The left ventricular cavity is mildly dilated.  Left ventricular ejection fraction appears to be 36 - 40%. Left ventricular systolic function is moderately decreased.  The following left ventricular wall segments are akinetic: apical anterior, apical lateral, apical inferior, apical septal, apex and mid anteroseptal.  Left ventricular diastolic  function is consistent with (grade I) impaired relaxation.  Left atrial volume is mildly increased.  Estimated right ventricular systolic pressure from tricuspid regurgitation is normal (<35 mmHg).  There is a trivial pericardial effusion. The effusion is fluid filled. There is no evidence of cardiac tamponade.  Compared to echo dated 4/29/2017 LVEF has dropped from 60% to 36-40% with new wall motion abnormalities         Neuro/Psych  (+) CVA (2013),    (-) seizures, TIA  GI/Hepatic/Renal/Endo    (+)   renal disease CRI,   (-) liver disease, diabetes    Musculoskeletal     Abdominal    Substance History      OB/GYN          Other                      Anesthesia Plan    ASA 4     general, PAC, CVL and [place]     (No C/I to LINNETTE)  intravenous induction     Anesthetic plan, risks, benefits, and alternatives have been provided, discussed and informed consent has been obtained with: patient and spouse/significant other.        CODE STATUS:

## 2023-01-01 LAB
QT INTERVAL: 472 MS
QTC INTERVAL: 451 MS

## 2023-01-03 ENCOUNTER — APPOINTMENT (OUTPATIENT)
Dept: CARDIOLOGY | Facility: HOSPITAL | Age: 78
DRG: 236 | End: 2023-01-03
Payer: MEDICARE

## 2023-01-03 ENCOUNTER — HOSPITAL ENCOUNTER (INPATIENT)
Facility: HOSPITAL | Age: 78
LOS: 5 days | Discharge: HOME | DRG: 236 | End: 2023-01-08
Attending: SURGERY | Admitting: SURGERY
Payer: MEDICARE

## 2023-01-03 ENCOUNTER — ANESTHESIA (OUTPATIENT)
Dept: PERIOP | Facility: HOSPITAL | Age: 78
DRG: 236 | End: 2023-01-03
Payer: MEDICARE

## 2023-01-03 ENCOUNTER — APPOINTMENT (OUTPATIENT)
Dept: GENERAL RADIOLOGY | Facility: HOSPITAL | Age: 78
DRG: 236 | End: 2023-01-03
Payer: MEDICARE

## 2023-01-03 DIAGNOSIS — N18.32 STAGE 3B CHRONIC KIDNEY DISEASE: ICD-10-CM

## 2023-01-03 DIAGNOSIS — Z74.09 IMPAIRED MOBILITY: ICD-10-CM

## 2023-01-03 DIAGNOSIS — I25.810 CORONARY ARTERY DISEASE INVOLVING CORONARY BYPASS GRAFT OF NATIVE HEART WITHOUT ANGINA PECTORIS: Primary | ICD-10-CM

## 2023-01-03 DIAGNOSIS — I25.10 CORONARY ARTERY DISEASE INVOLVING NATIVE CORONARY ARTERY OF NATIVE HEART WITHOUT ANGINA PECTORIS: ICD-10-CM

## 2023-01-03 PROBLEM — E66.3 OVERWEIGHT (BMI 25.0-29.9): Status: ACTIVE | Noted: 2023-01-03

## 2023-01-03 PROBLEM — Z87.891 FORMER SMOKER: Status: ACTIVE | Noted: 2023-01-03

## 2023-01-03 LAB
A-A DO2: 137.3 MMHG
A-A DO2: 161.2 MMHG
A-A DO2: 205.3 MMHG
A-A DO2: 249.5 MMHG
ABO GROUP BLD: NORMAL
ALBUMIN SERPL-MCNC: 3.1 G/DL (ref 3.5–5.2)
ALBUMIN SERPL-MCNC: 3.9 G/DL (ref 3.5–5.2)
ANION GAP SERPL CALCULATED.3IONS-SCNC: 11 MMOL/L (ref 5–15)
ANION GAP SERPL CALCULATED.3IONS-SCNC: 13 MMOL/L (ref 5–15)
APTT PPP: 28.4 SECONDS (ref 24.1–35)
ARTERIAL PATENCY WRIST A: ABNORMAL
ATMOSPHERIC PRESS: 742 MMHG
ATMOSPHERIC PRESS: 743 MMHG
ATMOSPHERIC PRESS: 743 MMHG
BASE EXCESS BLDA CALC-SCNC: -0.8 MMOL/L (ref 0–2)
BASE EXCESS BLDA CALC-SCNC: -2.4 MMOL/L (ref 0–2)
BASE EXCESS BLDA CALC-SCNC: -3.1 MMOL/L (ref 0–2)
BASE EXCESS BLDA CALC-SCNC: -3.7 MMOL/L (ref 0–2)
BASE EXCESS BLDA CALC-SCNC: -3.8 MMOL/L (ref 0–2)
BASOPHILS # BLD AUTO: 0.07 10*3/MM3 (ref 0–0.2)
BASOPHILS NFR BLD AUTO: 0.7 % (ref 0–1.5)
BDY SITE: ABNORMAL
BLD GP AB SCN SERPL QL: NEGATIVE
BODY TEMPERATURE: 37 C
BUN SERPL-MCNC: 18 MG/DL (ref 8–23)
BUN SERPL-MCNC: 19 MG/DL (ref 8–23)
BUN/CREAT SERPL: 12.3 (ref 7–25)
BUN/CREAT SERPL: 13 (ref 7–25)
CA-I BLD-MCNC: 4.39 MG/DL (ref 4.6–5.4)
CA-I BLD-MCNC: 4.71 MG/DL (ref 4.6–5.4)
CA-I BLD-MCNC: 5.45 MG/DL (ref 4.6–5.4)
CA-I BLD-MCNC: 5.53 MG/DL (ref 4.6–5.4)
CA-I BLD-MCNC: 5.6 MG/DL (ref 4.6–5.4)
CALCIUM SPEC-SCNC: 8.8 MG/DL (ref 8.6–10.5)
CALCIUM SPEC-SCNC: 9.7 MG/DL (ref 8.6–10.5)
CHLORIDE SERPL-SCNC: 109 MMOL/L (ref 98–107)
CHLORIDE SERPL-SCNC: 109 MMOL/L (ref 98–107)
CO2 SERPL-SCNC: 20 MMOL/L (ref 22–29)
CO2 SERPL-SCNC: 21 MMOL/L (ref 22–29)
COHGB MFR BLD: 0.5 % (ref 0–5)
COHGB MFR BLD: 0.5 % (ref 0–5)
COHGB MFR BLD: 0.6 % (ref 0–5)
COHGB MFR BLD: 1 % (ref 0–5)
CREAT SERPL-MCNC: 1.46 MG/DL (ref 0.76–1.27)
CREAT SERPL-MCNC: 1.46 MG/DL (ref 0.76–1.27)
D-LACTATE SERPL-SCNC: 1.3 MMOL/L (ref 0.5–2)
DEPRECATED RDW RBC AUTO: 46.7 FL (ref 37–54)
DEPRECATED RDW RBC AUTO: 47.7 FL (ref 37–54)
DEPRECATED RDW RBC AUTO: 47.7 FL (ref 37–54)
EGFRCR SERPLBLD CKD-EPI 2021: 49.8 ML/MIN/1.73
EGFRCR SERPLBLD CKD-EPI 2021: 49.8 ML/MIN/1.73
EOSINOPHIL # BLD AUTO: 0.94 10*3/MM3 (ref 0–0.4)
EOSINOPHIL NFR BLD AUTO: 9.4 % (ref 0.3–6.2)
ERYTHROCYTE [DISTWIDTH] IN BLOOD BY AUTOMATED COUNT: 13.5 % (ref 12.3–15.4)
FIBRINOGEN PPP-MCNC: 340 MG/DL (ref 240–460)
GLUCOSE BLDC GLUCOMTR-MCNC: 139 MG/DL (ref 70–130)
GLUCOSE BLDC GLUCOMTR-MCNC: 150 MG/DL (ref 70–130)
GLUCOSE BLDC GLUCOMTR-MCNC: 166 MG/DL (ref 70–130)
GLUCOSE BLDC GLUCOMTR-MCNC: 172 MG/DL (ref 70–130)
GLUCOSE BLDC GLUCOMTR-MCNC: 176 MG/DL (ref 70–130)
GLUCOSE SERPL-MCNC: 165 MG/DL (ref 65–99)
GLUCOSE SERPL-MCNC: 181 MG/DL (ref 65–99)
HCO3 BLDA-SCNC: 21.3 MMOL/L (ref 20–26)
HCO3 BLDA-SCNC: 21.9 MMOL/L (ref 20–26)
HCO3 BLDA-SCNC: 22.6 MMOL/L (ref 20–26)
HCO3 BLDA-SCNC: 23.1 MMOL/L (ref 20–26)
HCO3 BLDA-SCNC: 23.6 MMOL/L (ref 20–26)
HCT VFR BLD AUTO: 32.2 % (ref 37.5–51)
HCT VFR BLD AUTO: 32.6 % (ref 37.5–51)
HCT VFR BLD AUTO: 32.6 % (ref 37.5–51)
HCT VFR BLD CALC: 30.8 % (ref 38–51)
HCT VFR BLD CALC: 34.1 % (ref 38–51)
HCT VFR BLD CALC: 35.3 % (ref 38–51)
HCT VFR BLD CALC: 35.9 % (ref 38–51)
HGB BLD-MCNC: 10.5 G/DL (ref 13–17.7)
HGB BLD-MCNC: 10.7 G/DL (ref 13–17.7)
HGB BLD-MCNC: 10.7 G/DL (ref 13–17.7)
HGB BLDA-MCNC: 10.1 G/DL (ref 14–18)
HGB BLDA-MCNC: 11.1 G/DL (ref 14–18)
HGB BLDA-MCNC: 11.5 G/DL (ref 14–18)
HGB BLDA-MCNC: 11.7 G/DL (ref 14–18)
IMM GRANULOCYTES # BLD AUTO: 0.15 10*3/MM3 (ref 0–0.05)
IMM GRANULOCYTES NFR BLD AUTO: 1.5 % (ref 0–0.5)
INHALED O2 CONCENTRATION: 100 %
INHALED O2 CONCENTRATION: 45 %
INHALED O2 CONCENTRATION: 60 %
INR PPP: 1.12 (ref 0.91–1.09)
LYMPHOCYTES # BLD AUTO: 2.91 10*3/MM3 (ref 0.7–3.1)
LYMPHOCYTES NFR BLD AUTO: 29 % (ref 19.6–45.3)
Lab: ABNORMAL
MCH RBC QN AUTO: 30.8 PG (ref 26.6–33)
MCH RBC QN AUTO: 31.2 PG (ref 26.6–33)
MCH RBC QN AUTO: 31.2 PG (ref 26.6–33)
MCHC RBC AUTO-ENTMCNC: 32.6 G/DL (ref 31.5–35.7)
MCHC RBC AUTO-ENTMCNC: 32.8 G/DL (ref 31.5–35.7)
MCHC RBC AUTO-ENTMCNC: 32.8 G/DL (ref 31.5–35.7)
MCV RBC AUTO: 94.4 FL (ref 79–97)
MCV RBC AUTO: 95 FL (ref 79–97)
MCV RBC AUTO: 95 FL (ref 79–97)
METHGB BLD QL: 0.6 % (ref 0–3)
METHGB BLD QL: 0.8 % (ref 0–3)
METHGB BLD QL: 0.9 % (ref 0–3)
METHGB BLD QL: 1 % (ref 0–3)
MODALITY: ABNORMAL
MONOCYTES # BLD AUTO: 0.41 10*3/MM3 (ref 0.1–0.9)
MONOCYTES NFR BLD AUTO: 4.1 % (ref 5–12)
NEUTROPHILS NFR BLD AUTO: 5.57 10*3/MM3 (ref 1.7–7)
NEUTROPHILS NFR BLD AUTO: 55.3 % (ref 42.7–76)
NOTE: ABNORMAL
NOTIFIED BY: ABNORMAL
NOTIFIED WHO: ABNORMAL
NRBC BLD AUTO-RTO: 0 /100 WBC (ref 0–0.2)
OXYHGB MFR BLDV: 97 % (ref 94–99)
OXYHGB MFR BLDV: >98.5 % (ref 94–99)
PCO2 BLDA: 37.4 MM HG (ref 35–45)
PCO2 BLDA: 37.7 MM HG (ref 35–45)
PCO2 BLDA: 41.2 MM HG (ref 35–45)
PCO2 BLDA: 41.7 MM HG (ref 35–45)
PCO2 BLDA: 41.8 MM HG (ref 35–45)
PCO2 TEMP ADJ BLD: 37.4 MM HG (ref 35–45)
PCO2 TEMP ADJ BLD: 37.7 MM HG (ref 35–45)
PCO2 TEMP ADJ BLD: 41.2 MM HG (ref 35–45)
PCO2 TEMP ADJ BLD: 41.7 MM HG (ref 35–45)
PCO2 TEMP ADJ BLD: 41.8 MM HG (ref 35–45)
PEEP RESPIRATORY: 10 CM[H2O]
PEEP RESPIRATORY: 10 CM[H2O]
PH BLDA: 7.33 PH UNITS (ref 7.35–7.45)
PH BLDA: 7.34 PH UNITS (ref 7.35–7.45)
PH BLDA: 7.35 PH UNITS (ref 7.35–7.45)
PH BLDA: 7.36 PH UNITS (ref 7.35–7.45)
PH BLDA: 7.41 PH UNITS (ref 7.35–7.45)
PH, TEMP CORRECTED: 7.33 PH UNITS (ref 7.35–7.45)
PH, TEMP CORRECTED: 7.34 PH UNITS (ref 7.35–7.45)
PH, TEMP CORRECTED: 7.35 PH UNITS (ref 7.35–7.45)
PH, TEMP CORRECTED: 7.36 PH UNITS (ref 7.35–7.45)
PH, TEMP CORRECTED: 7.41 PH UNITS (ref 7.35–7.45)
PHOSPHATE SERPL-MCNC: 3 MG/DL (ref 2.5–4.5)
PHOSPHATE SERPL-MCNC: 3.2 MG/DL (ref 2.5–4.5)
PLATELET # BLD AUTO: 199 10*3/MM3 (ref 140–450)
PMV BLD AUTO: 10.6 FL (ref 6–12)
PMV BLD AUTO: 10.6 FL (ref 6–12)
PMV BLD AUTO: 10.8 FL (ref 6–12)
PO2 BLDA: 123 MM HG (ref 83–108)
PO2 BLDA: 154 MM HG (ref 83–108)
PO2 BLDA: 449 MM HG (ref 83–108)
PO2 BLDA: 493 MM HG (ref 83–108)
PO2 BLDA: 520 MM HG (ref 83–108)
PO2 TEMP ADJ BLD: 123 MM HG (ref 83–108)
PO2 TEMP ADJ BLD: 154 MM HG (ref 83–108)
PO2 TEMP ADJ BLD: 449 MM HG (ref 83–108)
PO2 TEMP ADJ BLD: 493 MM HG (ref 83–108)
PO2 TEMP ADJ BLD: 520 MM HG (ref 83–108)
POTASSIUM BLDA-SCNC: 3.2 MMOL/L (ref 3.5–5.2)
POTASSIUM BLDA-SCNC: 3.3 MMOL/L (ref 3.5–5.2)
POTASSIUM BLDA-SCNC: 3.4 MMOL/L (ref 3.5–5.2)
POTASSIUM BLDA-SCNC: 3.6 MMOL/L (ref 3.5–5.2)
POTASSIUM SERPL-SCNC: 3.6 MMOL/L (ref 3.5–5.2)
POTASSIUM SERPL-SCNC: 3.8 MMOL/L (ref 3.5–5.2)
POTASSIUM SERPL-SCNC: 3.9 MMOL/L (ref 3.5–5.2)
PROTHROMBIN TIME: 14.5 SECONDS (ref 11.8–14.8)
PSV: 10 CMH2O
PSV: 10 CMH2O
RBC # BLD AUTO: 3.41 10*6/MM3 (ref 4.14–5.8)
RBC # BLD AUTO: 3.43 10*6/MM3 (ref 4.14–5.8)
RBC # BLD AUTO: 3.43 10*6/MM3 (ref 4.14–5.8)
RH BLD: POSITIVE
SAO2 % BLDCOA: 99 % (ref 94–99)
SAO2 % BLDCOA: 99.7 % (ref 94–99)
SAO2 % BLDCOA: >100.1 % (ref 94–99)
SET MECH RESP RATE: 20
SET MECH RESP RATE: 20
SODIUM BLDA-SCNC: 139 MMOL/L (ref 136–145)
SODIUM BLDA-SCNC: 140 MMOL/L (ref 136–145)
SODIUM BLDA-SCNC: 140 MMOL/L (ref 136–145)
SODIUM BLDA-SCNC: 141 MMOL/L (ref 136–145)
SODIUM SERPL-SCNC: 141 MMOL/L (ref 136–145)
SODIUM SERPL-SCNC: 142 MMOL/L (ref 136–145)
T&S EXPIRATION DATE: NORMAL
VENTILATOR MODE: ABNORMAL
VENTILATOR MODE: ABNORMAL
VENTILATOR MODE: AC
VT ON VENT VENT: 550 ML
VT ON VENT VENT: 550 ML
WBC NRBC COR # BLD: 10.05 10*3/MM3 (ref 3.4–10.8)
WBC NRBC COR # BLD: 10.05 10*3/MM3 (ref 3.4–10.8)
WBC NRBC COR # BLD: 10.37 10*3/MM3 (ref 3.4–10.8)

## 2023-01-03 PROCEDURE — C1751 CATH, INF, PER/CENT/MIDLINE: HCPCS | Performed by: NURSE ANESTHETIST, CERTIFIED REGISTERED

## 2023-01-03 PROCEDURE — 94640 AIRWAY INHALATION TREATMENT: CPT

## 2023-01-03 PROCEDURE — 94799 UNLISTED PULMONARY SVC/PX: CPT

## 2023-01-03 PROCEDURE — P9041 ALBUMIN (HUMAN),5%, 50ML: HCPCS | Performed by: NURSE ANESTHETIST, CERTIFIED REGISTERED

## 2023-01-03 PROCEDURE — 94002 VENT MGMT INPAT INIT DAY: CPT

## 2023-01-03 PROCEDURE — 85027 COMPLETE CBC AUTOMATED: CPT | Performed by: SURGERY

## 2023-01-03 PROCEDURE — 25010000002 ALBUMIN HUMAN 5% PER 50 ML

## 2023-01-03 PROCEDURE — 71045 X-RAY EXAM CHEST 1 VIEW: CPT

## 2023-01-03 PROCEDURE — 0 BUPIVACAINE LIPOSOME 1.3 % SUSPENSION 20 ML VIAL: Performed by: SURGERY

## 2023-01-03 PROCEDURE — 86850 RBC ANTIBODY SCREEN: CPT | Performed by: SURGERY

## 2023-01-03 PROCEDURE — 84132 ASSAY OF SERUM POTASSIUM: CPT | Performed by: SURGERY

## 2023-01-03 PROCEDURE — 85025 COMPLETE CBC W/AUTO DIFF WBC: CPT | Performed by: SURGERY

## 2023-01-03 PROCEDURE — 82805 BLOOD GASES W/O2 SATURATION: CPT

## 2023-01-03 PROCEDURE — 93325 DOPPLER ECHO COLOR FLOW MAPG: CPT | Performed by: EMERGENCY MEDICINE

## 2023-01-03 PROCEDURE — 33533 CABG ARTERIAL SINGLE: CPT | Performed by: SURGERY

## 2023-01-03 PROCEDURE — 36430 TRANSFUSION BLD/BLD COMPNT: CPT

## 2023-01-03 PROCEDURE — 94664 DEMO&/EVAL PT USE INHALER: CPT

## 2023-01-03 PROCEDURE — 25010000002 FENTANYL CITRATE (PF) 250 MCG/5ML SOLUTION: Performed by: NURSE ANESTHETIST, CERTIFIED REGISTERED

## 2023-01-03 PROCEDURE — 25010000002 ALBUMIN HUMAN 5% PER 50 ML: Performed by: NURSE ANESTHETIST, CERTIFIED REGISTERED

## 2023-01-03 PROCEDURE — 83050 HGB METHEMOGLOBIN QUAN: CPT

## 2023-01-03 PROCEDURE — 85610 PROTHROMBIN TIME: CPT | Performed by: SURGERY

## 2023-01-03 PROCEDURE — 86900 BLOOD TYPING SEROLOGIC ABO: CPT | Performed by: SURGERY

## 2023-01-03 PROCEDURE — 82962 GLUCOSE BLOOD TEST: CPT

## 2023-01-03 PROCEDURE — C1713 ANCHOR/SCREW BN/BN,TIS/BN: HCPCS | Performed by: SURGERY

## 2023-01-03 PROCEDURE — 85730 THROMBOPLASTIN TIME PARTIAL: CPT | Performed by: SURGERY

## 2023-01-03 PROCEDURE — 25010000002 CEFAZOLIN PER 500 MG: Performed by: NURSE ANESTHETIST, CERTIFIED REGISTERED

## 2023-01-03 PROCEDURE — 86901 BLOOD TYPING SEROLOGIC RH(D): CPT | Performed by: SURGERY

## 2023-01-03 PROCEDURE — 86923 COMPATIBILITY TEST ELECTRIC: CPT

## 2023-01-03 PROCEDURE — 25010000002 HEPARIN (PORCINE) PER 1000 UNITS: Performed by: NURSE ANESTHETIST, CERTIFIED REGISTERED

## 2023-01-03 PROCEDURE — 0 POTASSIUM CHLORIDE PER 2 MEQ: Performed by: SURGERY

## 2023-01-03 PROCEDURE — P9016 RBC LEUKOCYTES REDUCED: HCPCS

## 2023-01-03 PROCEDURE — 82803 BLOOD GASES ANY COMBINATION: CPT

## 2023-01-03 PROCEDURE — 25010000002 MIDAZOLAM PER 1 MG: Performed by: ANESTHESIOLOGY

## 2023-01-03 PROCEDURE — 25010000002 PROTAMINE SULFATE PER 10 MG: Performed by: NURSE ANESTHETIST, CERTIFIED REGISTERED

## 2023-01-03 PROCEDURE — 25010000002 CEFAZOLIN PER 500 MG: Performed by: SURGERY

## 2023-01-03 PROCEDURE — P9041 ALBUMIN (HUMAN),5%, 50ML: HCPCS

## 2023-01-03 PROCEDURE — C9290 INJ, BUPIVACAINE LIPOSOME: HCPCS | Performed by: SURGERY

## 2023-01-03 PROCEDURE — 85384 FIBRINOGEN ACTIVITY: CPT | Performed by: SURGERY

## 2023-01-03 PROCEDURE — 93312 ECHO TRANSESOPHAGEAL: CPT | Performed by: EMERGENCY MEDICINE

## 2023-01-03 PROCEDURE — 02100Z9 BYPASS CORONARY ARTERY, ONE ARTERY FROM LEFT INTERNAL MAMMARY, OPEN APPROACH: ICD-10-PCS | Performed by: SURGERY

## 2023-01-03 PROCEDURE — 0 INSULIN REGULAR HUMAN PER 5 UNITS: Performed by: SURGERY

## 2023-01-03 PROCEDURE — 25010000002 VANCOMYCIN 1 G RECONSTITUTED SOLUTION 1 EACH VIAL: Performed by: SURGERY

## 2023-01-03 PROCEDURE — 80069 RENAL FUNCTION PANEL: CPT | Performed by: SURGERY

## 2023-01-03 PROCEDURE — 25010000002 MIDAZOLAM PER 1 MG: Performed by: NURSE ANESTHETIST, CERTIFIED REGISTERED

## 2023-01-03 PROCEDURE — 82330 ASSAY OF CALCIUM: CPT

## 2023-01-03 PROCEDURE — 82375 ASSAY CARBOXYHB QUANT: CPT

## 2023-01-03 PROCEDURE — 93010 ELECTROCARDIOGRAM REPORT: CPT | Performed by: INTERNAL MEDICINE

## 2023-01-03 PROCEDURE — S0260 H&P FOR SURGERY: HCPCS | Performed by: SURGERY

## 2023-01-03 PROCEDURE — 25810000003 DEXTROSE 5 % WITH KCL 20 MEQ 20-5 MEQ/L-% SOLUTION: Performed by: SURGERY

## 2023-01-03 PROCEDURE — 83605 ASSAY OF LACTIC ACID: CPT | Performed by: SURGERY

## 2023-01-03 PROCEDURE — 86900 BLOOD TYPING SEROLOGIC ABO: CPT

## 2023-01-03 PROCEDURE — 94761 N-INVAS EAR/PLS OXIMETRY MLT: CPT

## 2023-01-03 PROCEDURE — 93005 ELECTROCARDIOGRAM TRACING: CPT | Performed by: SURGERY

## 2023-01-03 PROCEDURE — 93318 ECHO TRANSESOPHAGEAL INTRAOP: CPT

## 2023-01-03 DEVICE — KT HEMOST ABS SURGIFOAM PORCN 1GRAM: Type: IMPLANTABLE DEVICE | Site: CHEST | Status: FUNCTIONAL

## 2023-01-03 DEVICE — WR SUT NONABS MF SS V40 1/2CIR TC 6/0 18IN M649G: Type: IMPLANTABLE DEVICE | Site: CHEST | Status: FUNCTIONAL

## 2023-01-03 RX ORDER — ALBUMIN, HUMAN INJ 5% 5 %
SOLUTION INTRAVENOUS
Status: COMPLETED
Start: 2023-01-03 | End: 2023-01-03

## 2023-01-03 RX ORDER — POTASSIUM CHLORIDE 29.8 MG/ML
20 INJECTION INTRAVENOUS
Status: COMPLETED | OUTPATIENT
Start: 2023-01-03 | End: 2023-01-03

## 2023-01-03 RX ORDER — SODIUM CHLORIDE 0.9 % (FLUSH) 0.9 %
3 SYRINGE (ML) INJECTION EVERY 12 HOURS SCHEDULED
Status: DISCONTINUED | OUTPATIENT
Start: 2023-01-03 | End: 2023-01-03 | Stop reason: HOSPADM

## 2023-01-03 RX ORDER — ATORVASTATIN CALCIUM 10 MG/1
20 TABLET, FILM COATED ORAL NIGHTLY
Status: DISCONTINUED | OUTPATIENT
Start: 2023-01-04 | End: 2023-01-08 | Stop reason: HOSPADM

## 2023-01-03 RX ORDER — ALBUMIN, HUMAN INJ 5% 5 %
500 SOLUTION INTRAVENOUS ONCE
Status: COMPLETED | OUTPATIENT
Start: 2023-01-03 | End: 2023-01-03

## 2023-01-03 RX ORDER — NOREPINEPHRINE BIT/0.9 % NACL 8 MG/250ML
.02-.3 INFUSION BOTTLE (ML) INTRAVENOUS CONTINUOUS PRN
Status: DISCONTINUED | OUTPATIENT
Start: 2023-01-03 | End: 2023-01-05

## 2023-01-03 RX ORDER — CALCIUM CHLORIDE 100 MG/ML
INJECTION INTRAVENOUS; INTRAVENTRICULAR AS NEEDED
Status: DISCONTINUED | OUTPATIENT
Start: 2023-01-03 | End: 2023-01-03 | Stop reason: SURG

## 2023-01-03 RX ORDER — LIDOCAINE HYDROCHLORIDE 10 MG/ML
0.5 INJECTION, SOLUTION EPIDURAL; INFILTRATION; INTRACAUDAL; PERINEURAL ONCE AS NEEDED
Status: DISCONTINUED | OUTPATIENT
Start: 2023-01-03 | End: 2023-01-03 | Stop reason: HOSPADM

## 2023-01-03 RX ORDER — NICOTINE POLACRILEX 4 MG
15 LOZENGE BUCCAL
Status: DISCONTINUED | OUTPATIENT
Start: 2023-01-03 | End: 2023-01-04

## 2023-01-03 RX ORDER — SODIUM CHLORIDE, SODIUM LACTATE, POTASSIUM CHLORIDE, CALCIUM CHLORIDE 600; 310; 30; 20 MG/100ML; MG/100ML; MG/100ML; MG/100ML
1000 INJECTION, SOLUTION INTRAVENOUS CONTINUOUS
Status: DISCONTINUED | OUTPATIENT
Start: 2023-01-03 | End: 2023-01-03

## 2023-01-03 RX ORDER — ALBUMIN, HUMAN INJ 5% 5 %
SOLUTION INTRAVENOUS CONTINUOUS PRN
Status: DISCONTINUED | OUTPATIENT
Start: 2023-01-03 | End: 2023-01-03 | Stop reason: SURG

## 2023-01-03 RX ORDER — BISACODYL 5 MG/1
10 TABLET, DELAYED RELEASE ORAL 2 TIMES DAILY
Status: DISCONTINUED | OUTPATIENT
Start: 2023-01-04 | End: 2023-01-08 | Stop reason: HOSPADM

## 2023-01-03 RX ORDER — SODIUM CHLORIDE, SODIUM LACTATE, POTASSIUM CHLORIDE, CALCIUM CHLORIDE 600; 310; 30; 20 MG/100ML; MG/100ML; MG/100ML; MG/100ML
100 INJECTION, SOLUTION INTRAVENOUS CONTINUOUS
Status: DISCONTINUED | OUTPATIENT
Start: 2023-01-03 | End: 2023-01-03

## 2023-01-03 RX ORDER — ROCURONIUM BROMIDE 10 MG/ML
INJECTION, SOLUTION INTRAVENOUS AS NEEDED
Status: DISCONTINUED | OUTPATIENT
Start: 2023-01-03 | End: 2023-01-03 | Stop reason: SURG

## 2023-01-03 RX ORDER — BUPIVACAINE HCL/0.9 % NACL/PF 0.1 %
2 PLASTIC BAG, INJECTION (ML) EPIDURAL ONCE
Status: DISCONTINUED | OUTPATIENT
Start: 2023-01-03 | End: 2023-01-03 | Stop reason: HOSPADM

## 2023-01-03 RX ORDER — SODIUM CHLORIDE 9 MG/ML
INJECTION, SOLUTION INTRAVENOUS CONTINUOUS PRN
Status: DISCONTINUED | OUTPATIENT
Start: 2023-01-03 | End: 2023-01-03 | Stop reason: SURG

## 2023-01-03 RX ORDER — ACETAMINOPHEN 325 MG/1
650 TABLET ORAL EVERY 4 HOURS PRN
Status: DISCONTINUED | OUTPATIENT
Start: 2023-01-04 | End: 2023-01-03

## 2023-01-03 RX ORDER — PROTAMINE SULFATE 10 MG/ML
INJECTION, SOLUTION INTRAVENOUS AS NEEDED
Status: DISCONTINUED | OUTPATIENT
Start: 2023-01-03 | End: 2023-01-03 | Stop reason: SURG

## 2023-01-03 RX ORDER — OXYCODONE HYDROCHLORIDE 5 MG/1
5 TABLET ORAL EVERY 4 HOURS PRN
Status: DISCONTINUED | OUTPATIENT
Start: 2023-01-03 | End: 2023-01-08 | Stop reason: HOSPADM

## 2023-01-03 RX ORDER — POTASSIUM CHLORIDE 29.8 MG/ML
20 INJECTION INTRAVENOUS
Status: DISCONTINUED | OUTPATIENT
Start: 2023-01-03 | End: 2023-01-05

## 2023-01-03 RX ORDER — POLYETHYLENE GLYCOL 3350 17 G/17G
17 POWDER, FOR SOLUTION ORAL DAILY
Status: DISCONTINUED | OUTPATIENT
Start: 2023-01-04 | End: 2023-01-08 | Stop reason: HOSPADM

## 2023-01-03 RX ORDER — ALBUTEROL SULFATE 2.5 MG/3ML
2.5 SOLUTION RESPIRATORY (INHALATION) EVERY 4 HOURS PRN
Status: ACTIVE | OUTPATIENT
Start: 2023-01-03 | End: 2023-01-04

## 2023-01-03 RX ORDER — BUPIVACAINE HCL/0.9 % NACL/PF 0.1 %
2 PLASTIC BAG, INJECTION (ML) EPIDURAL EVERY 8 HOURS
Status: COMPLETED | OUTPATIENT
Start: 2023-01-03 | End: 2023-01-05

## 2023-01-03 RX ORDER — SODIUM CHLORIDE 9 MG/ML
30 INJECTION, SOLUTION INTRAVENOUS CONTINUOUS PRN
Status: DISCONTINUED | OUTPATIENT
Start: 2023-01-03 | End: 2023-01-03 | Stop reason: HOSPADM

## 2023-01-03 RX ORDER — ASPIRIN 81 MG/1
162 TABLET, CHEWABLE ORAL ONCE
Status: COMPLETED | OUTPATIENT
Start: 2023-01-04 | End: 2023-01-04

## 2023-01-03 RX ORDER — ACETAMINOPHEN 10 MG/ML
1000 INJECTION, SOLUTION INTRAVENOUS EVERY 8 HOURS
Status: COMPLETED | OUTPATIENT
Start: 2023-01-03 | End: 2023-01-04

## 2023-01-03 RX ORDER — ACETAMINOPHEN 325 MG/1
650 TABLET ORAL EVERY 6 HOURS SCHEDULED
Status: DISCONTINUED | OUTPATIENT
Start: 2023-01-04 | End: 2023-01-08 | Stop reason: HOSPADM

## 2023-01-03 RX ORDER — CHLORHEXIDINE GLUCONATE 0.12 MG/ML
15 RINSE ORAL EVERY 12 HOURS SCHEDULED
Status: DISCONTINUED | OUTPATIENT
Start: 2023-01-03 | End: 2023-01-06

## 2023-01-03 RX ORDER — LIDOCAINE HYDROCHLORIDE 20 MG/ML
INJECTION, SOLUTION EPIDURAL; INFILTRATION; INTRACAUDAL; PERINEURAL AS NEEDED
Status: DISCONTINUED | OUTPATIENT
Start: 2023-01-03 | End: 2023-01-03 | Stop reason: SURG

## 2023-01-03 RX ORDER — IPRATROPIUM BROMIDE AND ALBUTEROL SULFATE 2.5; .5 MG/3ML; MG/3ML
3 SOLUTION RESPIRATORY (INHALATION)
Status: DISCONTINUED | OUTPATIENT
Start: 2023-01-03 | End: 2023-01-06

## 2023-01-03 RX ORDER — METOPROLOL TARTRATE 5 MG/5ML
INJECTION INTRAVENOUS AS NEEDED
Status: DISCONTINUED | OUTPATIENT
Start: 2023-01-03 | End: 2023-01-03 | Stop reason: SURG

## 2023-01-03 RX ORDER — MIDAZOLAM HYDROCHLORIDE 1 MG/ML
2 INJECTION INTRAMUSCULAR; INTRAVENOUS ONCE
Status: COMPLETED | OUTPATIENT
Start: 2023-01-03 | End: 2023-01-03

## 2023-01-03 RX ORDER — ENOXAPARIN SODIUM 100 MG/ML
40 INJECTION SUBCUTANEOUS DAILY
Status: DISCONTINUED | OUTPATIENT
Start: 2023-01-04 | End: 2023-01-08 | Stop reason: HOSPADM

## 2023-01-03 RX ORDER — PANTOPRAZOLE SODIUM 40 MG/1
40 TABLET, DELAYED RELEASE ORAL
Status: DISCONTINUED | OUTPATIENT
Start: 2023-01-04 | End: 2023-01-08 | Stop reason: HOSPADM

## 2023-01-03 RX ORDER — LIDOCAINE HYDROCHLORIDE 10 MG/ML
0.5 INJECTION, SOLUTION EPIDURAL; INFILTRATION; INTRACAUDAL; PERINEURAL ONCE AS NEEDED
Status: DISCONTINUED | OUTPATIENT
Start: 2023-01-03 | End: 2023-01-03 | Stop reason: SDUPTHER

## 2023-01-03 RX ORDER — ACETAMINOPHEN 650 MG/1
650 SUPPOSITORY RECTAL EVERY 4 HOURS PRN
Status: DISCONTINUED | OUTPATIENT
Start: 2023-01-04 | End: 2023-01-03

## 2023-01-03 RX ORDER — POTASSIUM CHLORIDE, DEXTROSE MONOHYDRATE 150; 5 MG/100ML; G/100ML
30 INJECTION, SOLUTION INTRAVENOUS CONTINUOUS
Status: DISCONTINUED | OUTPATIENT
Start: 2023-01-03 | End: 2023-01-05

## 2023-01-03 RX ORDER — SODIUM CHLORIDE 9 MG/ML
40 INJECTION, SOLUTION INTRAVENOUS AS NEEDED
Status: DISCONTINUED | OUTPATIENT
Start: 2023-01-03 | End: 2023-01-03 | Stop reason: HOSPADM

## 2023-01-03 RX ORDER — MIDAZOLAM HYDROCHLORIDE 1 MG/ML
0.5 INJECTION INTRAMUSCULAR; INTRAVENOUS
Status: DISCONTINUED | OUTPATIENT
Start: 2023-01-03 | End: 2023-01-03 | Stop reason: HOSPADM

## 2023-01-03 RX ORDER — CALCITRIOL 0.25 UG/1
0.25 CAPSULE, LIQUID FILLED ORAL DAILY
Status: DISCONTINUED | OUTPATIENT
Start: 2023-01-04 | End: 2023-01-08 | Stop reason: HOSPADM

## 2023-01-03 RX ORDER — SODIUM CHLORIDE 0.9 % (FLUSH) 0.9 %
10 SYRINGE (ML) INJECTION AS NEEDED
Status: DISCONTINUED | OUTPATIENT
Start: 2023-01-03 | End: 2023-01-03 | Stop reason: HOSPADM

## 2023-01-03 RX ORDER — MEPERIDINE HYDROCHLORIDE 50 MG/ML
25 INJECTION INTRAMUSCULAR; INTRAVENOUS; SUBCUTANEOUS
Status: DISCONTINUED | OUTPATIENT
Start: 2023-01-03 | End: 2023-01-04

## 2023-01-03 RX ORDER — NICOTINE POLACRILEX 4 MG
15 LOZENGE BUCCAL
Status: DISCONTINUED | OUTPATIENT
Start: 2023-01-03 | End: 2023-01-03 | Stop reason: HOSPADM

## 2023-01-03 RX ORDER — CLOPIDOGREL BISULFATE 75 MG/1
75 TABLET ORAL DAILY
Status: DISCONTINUED | OUTPATIENT
Start: 2023-01-04 | End: 2023-01-08 | Stop reason: HOSPADM

## 2023-01-03 RX ORDER — DEXMEDETOMIDINE HYDROCHLORIDE 4 UG/ML
.2-1.5 INJECTION, SOLUTION INTRAVENOUS
Status: DISCONTINUED | OUTPATIENT
Start: 2023-01-03 | End: 2023-01-05

## 2023-01-03 RX ORDER — DEXTROSE MONOHYDRATE 25 G/50ML
10-50 INJECTION, SOLUTION INTRAVENOUS
Status: DISCONTINUED | OUTPATIENT
Start: 2023-01-03 | End: 2023-01-04

## 2023-01-03 RX ORDER — ACETAMINOPHEN 160 MG/5ML
650 SOLUTION ORAL EVERY 4 HOURS PRN
Status: DISCONTINUED | OUTPATIENT
Start: 2023-01-04 | End: 2023-01-03

## 2023-01-03 RX ORDER — FENTANYL CITRATE 50 UG/ML
INJECTION, SOLUTION INTRAMUSCULAR; INTRAVENOUS AS NEEDED
Status: DISCONTINUED | OUTPATIENT
Start: 2023-01-03 | End: 2023-01-03 | Stop reason: SURG

## 2023-01-03 RX ORDER — SODIUM CHLORIDE 0.9 % (FLUSH) 0.9 %
30 SYRINGE (ML) INJECTION ONCE AS NEEDED
Status: DISCONTINUED | OUTPATIENT
Start: 2023-01-03 | End: 2023-01-03 | Stop reason: HOSPADM

## 2023-01-03 RX ORDER — ONDANSETRON 2 MG/ML
4 INJECTION INTRAMUSCULAR; INTRAVENOUS EVERY 6 HOURS PRN
Status: DISCONTINUED | OUTPATIENT
Start: 2023-01-03 | End: 2023-01-08 | Stop reason: HOSPADM

## 2023-01-03 RX ORDER — OXYCODONE HYDROCHLORIDE 5 MG/1
10 TABLET ORAL EVERY 4 HOURS PRN
Status: DISCONTINUED | OUTPATIENT
Start: 2023-01-03 | End: 2023-01-08 | Stop reason: HOSPADM

## 2023-01-03 RX ORDER — SODIUM CHLORIDE 0.9 % (FLUSH) 0.9 %
3 SYRINGE (ML) INJECTION AS NEEDED
Status: DISCONTINUED | OUTPATIENT
Start: 2023-01-03 | End: 2023-01-03 | Stop reason: HOSPADM

## 2023-01-03 RX ORDER — SODIUM CHLORIDE 0.9 % (FLUSH) 0.9 %
3-10 SYRINGE (ML) INJECTION AS NEEDED
Status: DISCONTINUED | OUTPATIENT
Start: 2023-01-03 | End: 2023-01-03 | Stop reason: HOSPADM

## 2023-01-03 RX ORDER — CEFAZOLIN SODIUM 1 G/3ML
INJECTION, POWDER, FOR SOLUTION INTRAMUSCULAR; INTRAVENOUS AS NEEDED
Status: DISCONTINUED | OUTPATIENT
Start: 2023-01-03 | End: 2023-01-03 | Stop reason: SURG

## 2023-01-03 RX ORDER — MIDAZOLAM HYDROCHLORIDE 1 MG/ML
INJECTION INTRAMUSCULAR; INTRAVENOUS AS NEEDED
Status: DISCONTINUED | OUTPATIENT
Start: 2023-01-03 | End: 2023-01-03 | Stop reason: SURG

## 2023-01-03 RX ORDER — SODIUM CHLORIDE 0.9 % (FLUSH) 0.9 %
10 SYRINGE (ML) INJECTION EVERY 12 HOURS SCHEDULED
Status: DISCONTINUED | OUTPATIENT
Start: 2023-01-03 | End: 2023-01-03 | Stop reason: HOSPADM

## 2023-01-03 RX ORDER — DEXTROSE MONOHYDRATE 25 G/50ML
10-50 INJECTION, SOLUTION INTRAVENOUS
Status: DISCONTINUED | OUTPATIENT
Start: 2023-01-03 | End: 2023-01-03 | Stop reason: HOSPADM

## 2023-01-03 RX ORDER — ASPIRIN 81 MG/1
81 TABLET ORAL DAILY
Status: DISCONTINUED | OUTPATIENT
Start: 2023-01-05 | End: 2023-01-08 | Stop reason: HOSPADM

## 2023-01-03 RX ORDER — ACETAMINOPHEN 500 MG
1000 TABLET ORAL ONCE
Status: COMPLETED | OUTPATIENT
Start: 2023-01-03 | End: 2023-01-03

## 2023-01-03 RX ORDER — MORPHINE SULFATE 2 MG/ML
2 INJECTION, SOLUTION INTRAMUSCULAR; INTRAVENOUS
Status: DISCONTINUED | OUTPATIENT
Start: 2023-01-03 | End: 2023-01-05

## 2023-01-03 RX ORDER — HEPARIN SODIUM 1000 [USP'U]/ML
INJECTION, SOLUTION INTRAVENOUS; SUBCUTANEOUS AS NEEDED
Status: DISCONTINUED | OUTPATIENT
Start: 2023-01-03 | End: 2023-01-03 | Stop reason: SURG

## 2023-01-03 RX ORDER — MAGNESIUM HYDROXIDE 1200 MG/15ML
LIQUID ORAL AS NEEDED
Status: DISCONTINUED | OUTPATIENT
Start: 2023-01-03 | End: 2023-01-03 | Stop reason: HOSPADM

## 2023-01-03 RX ORDER — NITROGLYCERIN 20 MG/100ML
5 INJECTION INTRAVENOUS CONTINUOUS
Status: DISCONTINUED | OUTPATIENT
Start: 2023-01-03 | End: 2023-01-05

## 2023-01-03 RX ORDER — CHLORHEXIDINE GLUCONATE 0.12 MG/ML
15 RINSE ORAL EVERY 12 HOURS
Status: DISCONTINUED | OUTPATIENT
Start: 2023-01-03 | End: 2023-01-03 | Stop reason: SDUPTHER

## 2023-01-03 RX ORDER — POTASSIUM CHLORIDE 29.8 MG/ML
20 INJECTION INTRAVENOUS
Status: COMPLETED | OUTPATIENT
Start: 2023-01-03 | End: 2023-01-04

## 2023-01-03 RX ORDER — FEBUXOSTAT 40 MG/1
40 TABLET, FILM COATED ORAL DAILY
Status: DISCONTINUED | OUTPATIENT
Start: 2023-01-04 | End: 2023-01-08 | Stop reason: HOSPADM

## 2023-01-03 RX ADMIN — CEFAZOLIN 2 G: 2 INJECTION, POWDER, FOR SOLUTION INTRAMUSCULAR; INTRAVENOUS at 22:42

## 2023-01-03 RX ADMIN — AMINOCAPROIC ACID 5 G: 250 INJECTION, SOLUTION INTRAVENOUS at 14:55

## 2023-01-03 RX ADMIN — SODIUM CHLORIDE: 9 INJECTION, SOLUTION INTRAVENOUS at 14:23

## 2023-01-03 RX ADMIN — POTASSIUM CHLORIDE AND DEXTROSE MONOHYDRATE 30 ML/HR: 150; 5 INJECTION, SOLUTION INTRAVENOUS at 18:44

## 2023-01-03 RX ADMIN — Medication 1 APPLICATION: at 11:32

## 2023-01-03 RX ADMIN — CALCIUM CHLORIDE 0.25 G: 100 INJECTION INTRAVENOUS; INTRAVENTRICULAR at 16:34

## 2023-01-03 RX ADMIN — ALBUMIN, HUMAN INJ 5% 500 ML: 5 SOLUTION at 20:42

## 2023-01-03 RX ADMIN — FENTANYL CITRATE 750 MCG: 50 INJECTION, SOLUTION INTRAMUSCULAR; INTRAVENOUS at 14:40

## 2023-01-03 RX ADMIN — ALBUMIN (HUMAN): 12.5 INJECTION, SOLUTION INTRAVENOUS at 16:35

## 2023-01-03 RX ADMIN — MIDAZOLAM HYDROCHLORIDE 5 MG: 1 INJECTION, SOLUTION INTRAMUSCULAR; INTRAVENOUS at 15:40

## 2023-01-03 RX ADMIN — SODIUM CHLORIDE, POTASSIUM CHLORIDE, SODIUM LACTATE AND CALCIUM CHLORIDE: 600; 310; 30; 20 INJECTION, SOLUTION INTRAVENOUS at 17:11

## 2023-01-03 RX ADMIN — SODIUM CHLORIDE, POTASSIUM CHLORIDE, SODIUM LACTATE AND CALCIUM CHLORIDE 1000 ML: 600; 310; 30; 20 INJECTION, SOLUTION INTRAVENOUS at 13:11

## 2023-01-03 RX ADMIN — ALBUMIN (HUMAN) 500 ML: 12.5 INJECTION, SOLUTION INTRAVENOUS at 18:15

## 2023-01-03 RX ADMIN — Medication 1 APPLICATION: at 21:14

## 2023-01-03 RX ADMIN — DEXMEDETOMIDINE HYDROCHLORIDE 1.5 MCG/KG/HR: 4 INJECTION, SOLUTION INTRAVENOUS at 20:02

## 2023-01-03 RX ADMIN — ACETAMINOPHEN 1000 MG: 500 TABLET ORAL at 11:32

## 2023-01-03 RX ADMIN — CALCIUM CHLORIDE 0.25 G: 100 INJECTION INTRAVENOUS; INTRAVENTRICULAR at 16:38

## 2023-01-03 RX ADMIN — FENTANYL CITRATE 100 MCG: 50 INJECTION, SOLUTION INTRAMUSCULAR; INTRAVENOUS at 16:34

## 2023-01-03 RX ADMIN — HEPARIN SODIUM 15000 UNITS: 1000 INJECTION, SOLUTION INTRAVENOUS; SUBCUTANEOUS at 16:09

## 2023-01-03 RX ADMIN — MIDAZOLAM HYDROCHLORIDE 2 MG: 2 INJECTION, SOLUTION INTRAMUSCULAR; INTRAVENOUS at 13:20

## 2023-01-03 RX ADMIN — FENTANYL CITRATE 50 MCG: 50 INJECTION, SOLUTION INTRAMUSCULAR; INTRAVENOUS at 16:14

## 2023-01-03 RX ADMIN — IPRATROPIUM BROMIDE AND ALBUTEROL SULFATE 3 ML: 2.5; .5 SOLUTION RESPIRATORY (INHALATION) at 19:27

## 2023-01-03 RX ADMIN — SODIUM CHLORIDE, POTASSIUM CHLORIDE, SODIUM LACTATE AND CALCIUM CHLORIDE 1000 ML: 600; 310; 30; 20 INJECTION, SOLUTION INTRAVENOUS at 21:35

## 2023-01-03 RX ADMIN — ALBUMIN (HUMAN): 12.5 INJECTION, SOLUTION INTRAVENOUS at 16:41

## 2023-01-03 RX ADMIN — METOPROLOL TARTRATE 1 MG: 1 INJECTION, SOLUTION INTRAVENOUS at 14:50

## 2023-01-03 RX ADMIN — CHLORHEXIDINE GLUCONATE 15 ML: 1.2 SOLUTION ORAL at 21:14

## 2023-01-03 RX ADMIN — FENTANYL CITRATE 100 MCG: 50 INJECTION, SOLUTION INTRAMUSCULAR; INTRAVENOUS at 16:59

## 2023-01-03 RX ADMIN — HEPARIN SODIUM 5000 UNITS: 1000 INJECTION, SOLUTION INTRAVENOUS; SUBCUTANEOUS at 16:00

## 2023-01-03 RX ADMIN — LIDOCAINE HYDROCHLORIDE 100 MG: 20 INJECTION, SOLUTION EPIDURAL; INFILTRATION; INTRACAUDAL; PERINEURAL at 14:40

## 2023-01-03 RX ADMIN — CEFAZOLIN 2 G: 1 INJECTION, POWDER, FOR SOLUTION INTRAMUSCULAR; INTRAVENOUS at 15:23

## 2023-01-03 RX ADMIN — SODIUM CHLORIDE, POTASSIUM CHLORIDE, SODIUM LACTATE AND CALCIUM CHLORIDE 1000 ML: 600; 310; 30; 20 INJECTION, SOLUTION INTRAVENOUS at 13:19

## 2023-01-03 RX ADMIN — ALBUMIN, HUMAN INJ 5% 500 ML: 5 SOLUTION at 18:15

## 2023-01-03 RX ADMIN — ALBUMIN (HUMAN) 500 ML: 12.5 INJECTION, SOLUTION INTRAVENOUS at 20:42

## 2023-01-03 RX ADMIN — CALCIUM CHLORIDE 0.25 G: 100 INJECTION INTRAVENOUS; INTRAVENTRICULAR at 16:30

## 2023-01-03 RX ADMIN — SODIUM CHLORIDE 2.1 UNITS/HR: 9 INJECTION, SOLUTION INTRAVENOUS at 19:13

## 2023-01-03 RX ADMIN — ACETAMINOPHEN 1000 MG: 10 INJECTION, SOLUTION INTRAVENOUS at 18:31

## 2023-01-03 RX ADMIN — ROCURONIUM BROMIDE 50 MG: 50 INJECTION INTRAVENOUS at 16:30

## 2023-01-03 RX ADMIN — POTASSIUM CHLORIDE 20 MEQ: 29.8 INJECTION, SOLUTION INTRAVENOUS at 19:13

## 2023-01-03 RX ADMIN — CALCIUM CHLORIDE 0.25 G: 100 INJECTION INTRAVENOUS; INTRAVENTRICULAR at 16:26

## 2023-01-03 RX ADMIN — ROCURONIUM BROMIDE 100 MG: 50 INJECTION INTRAVENOUS at 14:40

## 2023-01-03 RX ADMIN — POTASSIUM CHLORIDE AND DEXTROSE MONOHYDRATE 30 ML/HR: 150; 5 INJECTION, SOLUTION INTRAVENOUS at 18:45

## 2023-01-03 RX ADMIN — FENTANYL CITRATE 250 MCG: 50 INJECTION, SOLUTION INTRAMUSCULAR; INTRAVENOUS at 15:36

## 2023-01-03 RX ADMIN — SUGAMMADEX 200 MG: 100 INJECTION, SOLUTION INTRAVENOUS at 17:47

## 2023-01-03 RX ADMIN — FENTANYL CITRATE 250 MCG: 50 INJECTION, SOLUTION INTRAMUSCULAR; INTRAVENOUS at 15:08

## 2023-01-03 RX ADMIN — PROTAMINE SULFATE 190 MG: 10 INJECTION, SOLUTION INTRAVENOUS at 16:43

## 2023-01-03 RX ADMIN — MIDAZOLAM HYDROCHLORIDE 2 MG: 1 INJECTION, SOLUTION INTRAMUSCULAR; INTRAVENOUS at 14:30

## 2023-01-03 RX ADMIN — MIDAZOLAM HYDROCHLORIDE 3 MG: 1 INJECTION, SOLUTION INTRAMUSCULAR; INTRAVENOUS at 14:22

## 2023-01-03 NOTE — OP NOTE
Cardiac Surgery Operative Note     Date of Procedure:  1/3/2023     Preoperative Diagnosis:   Coronary artery disease involving native coronary artery of native heart   Chronic Systolic Heart Failure, EF 35%  CKD, Stage 3B  Hypertension  Hyperlipidemia  Overweight, BMI 29.3  CVA in 2013     Postoperative Diagnosis:  Same     Procedures Performed:  1. Coronary Artery Bypass, using arterial graft; one arterial graft, CPT 42826     Procedure Summary: Off-Pump CABG x1 (LIMA to LAD)     Surgeon:  Keith Griffin MD  Assistants:  Azeb Enamroado CFA  Anesthesia Staff:  Arnie Esquivel MD  Anesthesia Type:  General     Estimated Blood Loss:  Minimal (Cell Saver)     Drains:  1. 24 Portuguese Amando drain-left pleural space  2. 24 Portuguese Amando drains x2-anterior and posterior mediastinum     Specimens:  None     Operative Indications:     Mr. Philip is a 77-year-old male who presented to me initially with unstable angina as an inpatient of Dr. Cash.  He was found to have chronically occluded mid LAD with distal collateral filling.  He also has a history of chronic kidney disease with baseline creatinine around 2.  He had decreased LVEF with EF around 36 to 40%.  This was down from 60% in 2017.  With this we obtained a cardiac MRI which showed excellent viability of his anterior and apical wall.  With this I discussed with him the risk and benefits of proceeding with single-vessel bypass to his LAD as it was not easily PCI will given the .  He also had severe disease at the proximal portion of the PL branch posteriorly that was filling via left-to-right collaterals from the distal LAD.  I discussed this with him and I felt like the benefits of off-pump CABG in him with his baseline kidney disease would be more beneficial than the risk versus benefit of additional grafting to the PL branch that is very small and likely not graftable.  He understood this and agreed with the risk and benefits of proceeding with likely off-pump  coronary bypass grafting x1.     Operative Findings:  Excellent arterial conduit.  LAD was 2 mm in size at site of grafting, with  mild atherosclerotic disease burden, grafted using LIMA.        Transesophageal echocardiography showed moderate LV dysfunction with mild to moderate MR at the beginning of case, at the end of case LV dysfunction improved to mild with improved MR to near trace.        Operative description in detail:  The patient was taken to the operative suite where the patient was placed in a supine position.  Induction of general anesthesia and placement of a single-lumen endotracheal tube was performed without remark.  Appropriate arterial and venous access was established without remark.  A right IJ central venous catheter was placed followed by a [place] pulmonary artery catheter by anesthesia staff. The patient was then prepped and draped in the usual and sterile surgical fashion.  A timeout was performed.  Perioperative antibiotics were administered. Beta blocker was given.     Median sternotomy was performed in standard fashion. Hemostasis was ensured along sternal edges.  A Rultract device was used to expose the posterior sternal table.  The left internal mammary artery was taken down using a standard pedicle technique with a combination of electrocautery and clips to control all side branches.  At that time, the patient was systemically anticoagulated with IV heparin.  A 24 Sinhala Amando drain was placed in the left pleural space.  After suitable time of circulating heparin, clips were placed doubly onto the mammary arteries distally and they were divided proximal to the previously placed clips.  It had excellent arterial inflow.  The mammary artery harvest bed was hemostatic.  The Rultract device was removed from the sterile field and a sternal retractor was used for exposure.         Midline mediastinal fat and thymus were divided and pericardium opened.  A pericardial well was created.  20,000  units of heparin were given.  The LAD was examined and dissected out for suitable bypass sites.  .    I did place atrial pacing wires and the patient was bradycardic to aid in stability during cardiac stabilization, they had excellent capture.     I directed my attention towards the LAD.  The heart was again position with excellent exposure of the LAD, there was continued excellent hemodynamics.  The cardiac stabilizer suction device was used to stabilize LAD.  Proximal control the LAD was obtained with Silastic loop and distal as well with the same.  A coronary arteriotomy was made and augmented to size with Sagasutme scissors.  It is as per operative findings.  A 1.75 mm coronary shunt was placed.  The LIMA was prepared.  The anastomosis was constructed in an end-to-side orientation with running 7-0 Prolene suture.  The anastomosis was assessed for hemostasis which was excellent. It is without tension or torsion.       I surveyed the graft and anastomosis was hemostatic and had excellent lay.  Systemic intravenous protamine was administered.  There were excellent hemodynamics and LINNETTE showed normal LV function without any abnormalities.  The mediastinum was drained with 24 Pashto Amando drains placed anteriorly and posteriorly.  I surveyed the chest and hemostasis was pristine.  The sternum was reapproximated with stainless sterile wires placed in an interrupted fashion followed by sternal lock blue plates.  In layers anatomically the soft tissue planes were reapproximated. Instruments, sharps, and sponge counts were reported as correct.      Complications: None     Disposition: Transferred to ICU in stable and guarded condition.     Keith Griffin MD  Cardiothoracic Surgeon

## 2023-01-03 NOTE — ANESTHESIA PROCEDURE NOTES
Central Line    Pre-sedation assessment completed: 1/3/2023 2:52 PM    Patient location during procedure: OR  Start time: 1/3/2023 2:52 PM  Stop Time:1/3/2023 3:10 PM  Indications: vascular access and central pressure monitoring  Staff  Anesthesiologist: Arnie Hillman MD  Preanesthetic Checklist  Completed: patient identified, IV checked, site marked, risks and benefits discussed, surgical consent, monitors and equipment checked, pre-op evaluation and timeout performed  Central Line Prep  Sterile Tech:gloves, cap, gown, mask and sterile barriers  Prep: chloraprep  Patient monitoring: blood pressure monitoring, continuous pulse oximetry and EKG  Central Line Procedure  Laterality:right  Location:internal jugular  Catheter Type:MAC and [place]-Mk  Catheter Size:9 Fr  Guidance:ultrasound guided  PROCEDURE NOTE/ULTRASOUND INTERPRETATION.  Using ultrasound guidance the potential vascular sites for insertion of the catheter were visualized to determine the patency of the vessel to be used for vascular access.  After selecting the appropriate site for insertion, the needle was visualized under ultrasound being inserted into the internal jugular vein, followed by ultrasound confirmation of wire and catheter placement. There were no abnormalities seen on ultrasound; an image was taken; and the patient tolerated the procedure with no complications. Images: still images not obtained  Assessment  Post procedure:biopatch applied, line sutured and occlusive dressing applied  Assessement:blood return through all ports, free fluid flow, chest x-ray ordered and Haroon Test  Complications:no  Patient Tolerance:patient tolerated the procedure well with no apparent complications  Additional Notes  I had to stick twice because simply could not get complete advancement of wire. It went farther than 20cm, and was visualized on U/S in the RIJ, but I felt resistance with any more advancement. I placed catheter over wire and removed, thus  confirming with a Haroon test that it was intraluminal. Again advanced wire with similar effect. I then removed everything, repositioned head and advanced wire this time without issue. Easy placement of MAC and single pass for PAC wedge at 53cm;

## 2023-01-03 NOTE — ANESTHESIA PROCEDURE NOTES
Arterial Line      Patient location during procedure: OR   Preanesthetic Checklist  Completed: patient identified, IV checked, site marked, risks and benefits discussed, surgical consent, monitors and equipment checked, pre-op evaluation and timeout performed  Arterial Line Prep    Sterile Tech: cap, gloves and sterile barriers  Prep: ChloraPrep  Patient monitoring: EKG, continuous pulse oximetry and blood pressure monitoring  Arterial Line Procedure   Laterality:left  Location:  radial artery  Catheter size: 20 G   Guidance: ultrasound guided  PROCEDURE NOTE/ULTRASOUND INTERPRETATION.  Using ultrasound guidance the potential vascular sites for insertion of the catheter were visualized to determine the patency of the vessel to be used for vascular access.  After selecting the appropriate site for insertion, the needle was visualized under ultrasound being inserted into the radial artery, followed by ultrasound confirmation of wire and catheter placement. There were no abnormalities seen on ultrasound; an image was taken; and the patient tolerated the procedure with no complications.   Number of attempts: 1  Successful placement: yes Images: still images not obtained  Post Assessment   Dressing Type: wrist guard applied, secured with tape and occlusive dressing applied.   Complications no  Circ/Move/Sens Assessment: normal and unchanged.   Patient Tolerance: patient tolerated the procedure well with no apparent complications

## 2023-01-03 NOTE — ANESTHESIA PROCEDURE NOTES
Airway  Urgency: elective    Date/Time: 1/3/2023 2:41 PM  Airway not difficult    General Information and Staff    Patient location during procedure: OR  CRNA/CAA: Armando Subramanian CRNA    Indications and Patient Condition  Indications for airway management: airway protection    Preoxygenated: yes  Mask difficulty assessment: 1 - vent by mask    Final Airway Details  Final airway type: endotracheal airway      Successful airway: ETT  Cuffed: yes   Successful intubation technique: direct laryngoscopy  Facilitating devices/methods: intubating stylet  Endotracheal tube insertion site: oral  Blade: Goins  Blade size: 2  ETT size (mm): 7.0  Cormack-Lehane Classification: grade IIa - partial view of glottis  Placement verified by: chest auscultation and capnometry   Cuff volume (mL): 7  Measured from: lips  ETT/EBT  to lips (cm): 22  Number of attempts at approach: 1  Assessment: lips, teeth, and gum same as pre-op and atraumatic intubation    Additional Comments  Atraumatic intubation

## 2023-01-03 NOTE — ANESTHESIA PROCEDURE NOTES
Emergent/Open-Heart Anesthesia LINNETTE    Procedure Performed: Emergent/Open-Heart Anesthesia LINNETTE       Start Time:  1/3/2023 3:11 PM       End Time:   1/3/2023 3:22 PM    Preanesthesia Checklist:  Patient identified, IV assessed, risks and benefits discussed, monitors and equipment assessed, procedure being performed at surgeon's request and anesthesia consent obtained.    General Procedure Information  LINNETTE Placed for monitoring purposes only -- This is not a diagnostic LINNETTE

## 2023-01-03 NOTE — H&P
"No significant change since office note below.  Continued CKD, Cr. 1.9.  Discussed again the risks and benefits of proceeding with CABG, he understands and agrees to proceed.    Keith Griffin M.D.  Cardiothoracic Surgeon    CC: Severe coronary disease with decreased LVEF     Subjective:  Mr. Philip is a 77-year-old male who first evaluated in hospital after having coronary angiography.  This showed occlusion of mid LAD with distal collateral filling.  He has a history of chronic kidney disease with baseline creatinine around 2-2.2.  We discussed at that time proceeding with coronary artery bypass grafting, since then he has had viability via MRI which shows excellent viability in the distal LAD distribution.  He wanted to further discuss surgical options with me today.  He is overall doing well, he does have continued class I NYHA heart failure symptoms but no significant angina.  No recent illnesses.     ROS: No anginal type chest pain, hemodynamically stable, no fevers or chills or recent illnesses     Objective:        /82 (BP Location: Right arm, Patient Position: Sitting, Cuff Size: Adult)   Pulse 64   Ht 172.7 cm (68\")   Wt 90 kg (198 lb 6.4 oz)   SpO2 97%   BMI 30.17 kg/m²      PE:      Vitals:     11/28/22 1539   BP: 123/82   Pulse: 64   SpO2: 97%      GENERAL: NAD, resting comfortably, normal color  CARDIOVASCULAR: regular, regular rate, sinus  PULMONARY: Normal bilateral breath sounds, no labored breathing  ABDOMEN: soft, nontender/nondistended  EXTREMITIES: mild peripheral edema, normal pulses, normal ROM               Lab Results (last 72 hours)      ** No results found for the last 72 hours. **          Cath:  Normal left main coronary artery  Mid left anterior descending coronary artery subtotally occluded at the bifurcation site with a moderate sized second diagonal branch  After the subtotal occlusion in the mid left anterior descending coronary artery is fairly well visualized and is " satisfactory for grafting ramus branch with approximately 50% stenosis in the ostial segment  No high-grade obstructive disease of the left circumflex coronary artery noted  Moderate atherosclerotic changes with some aneurysmal dilatation of the proximal right coronary artery  Midportion has approximately a 40% stenosis  No significant disease of the right posterior descending coronary artery  Subtotally occluded proximal moderate-sized right posterior lateral artery  Collaterals noted from the right coronary artery reconstituting in the distal and apical left anterior descending coronary artery  Patent bilateral internal mammary arteries that can be used as a surgical conduit  Mildly elevated left ventricular end-diastolic pressure 16 mmHg  Left ventriculogram not performed to save contrast due to stage IIIb renal failure        ____________________________________________________________________________________________________________________________________________     Plan after cardiac catheterization     Will refer for CT surgery evaluation and consideration for possible aortocoronary bypass surgery  May require work-up for viable hibernating myocardium  Given shortage of thallium may consider gadolinium enhanced cardiac MRI however given renal failure this may not be feasible  Will defer to surgery whether aortocoronary bypass surgery is feasible/advisable in the above scenario  Patient will be admitted for gentle hydration given renal failure  Usual post procedure precautions after arteriotomy including monitoring for signs both local and systemic side effects.  On ultimate discharge will receive printed instructions  Intensive risk factor modifications for both primary and secondary prevention if applicable  Hydration   Observation     ECHO:  Interpretation Summary     • The left ventricular cavity is mildly dilated.  • Left ventricular ejection fraction appears to be 36 - 40%. Left ventricular systolic  function is moderately decreased.  • The following left ventricular wall segments are akinetic: apical anterior, apical lateral, apical inferior, apical septal, apex and mid anteroseptal.  • Left ventricular diastolic function is consistent with (grade I) impaired relaxation.  • Left atrial volume is mildly increased.  • Estimated right ventricular systolic pressure from tricuspid regurgitation is normal (<35 mmHg).  • There is a trivial pericardial effusion. The effusion is fluid filled. There is no evidence of cardiac tamponade.  • Compared to echo dated 4/29/2017 LVEF has dropped from 60% to 36-40% with new wall motion abnormalities        Cardiac MRI:          Assessment & Plan        Mr. Philip is a 77-year-old male who presented to me originally as an inpatient with  of the mid LAD with distal collateral filling, decreased LVEF around 36 to 40%, chronic kidney disease with baseline creatinine just above 2.  We are concerned about viability of the distal LAD distribution, he is undergone MRI viability testing which shows excellent viability of the LV past this.  I discussed with him the risk and benefits of proceeding with coronary bypass grafting, he wished to proceed but wants to return now to further discuss.     Today we again discussed the natural history of coronary disease especially in the setting of decreased LVEF such as his.  We discussed the mortality advantage and poor options for PCI given the  nature of his LAD.  We discussed the comorbid condition of chronic kidney disease stage IIIb and how this increases the risk of postoperative renal failure, he understands.  I discussed with him the steps that I would plan to take to try to minimize this risk including possibly off-pump single-vessel bypass to the distal LAD versus on pump beating.  He likes this idea.  We also discussed operative approaches and he was questioning whether I would be willing to perform minimally invasive approach  through a thoracotomy, I discussed at length with him this and my standard approach as median sternotomy he is okay with this and wishes to proceed this way.     We discussed the risk of surgery including but not limited to bleeding, infection, injury to major organs or vessels, chronic heart failure, arrhythmias, need for pacemaker, prolonged ventilation, renal failure, stroke, risk of anesthesia, risk of sternal wound or bone healing problems, reoperation, and/or death.  I calculate his patient specific STS risk were communicated this with him, he understands and agrees to proceed with surgery.     Thank you for trusting me with the care of Mr. Philip.  Please do not hesitate to call with questions or concerns.     Keith Griffin MD  Cardiothoracic Surgeon

## 2023-01-04 ENCOUNTER — APPOINTMENT (OUTPATIENT)
Dept: GENERAL RADIOLOGY | Facility: HOSPITAL | Age: 78
DRG: 236 | End: 2023-01-04
Payer: MEDICARE

## 2023-01-04 LAB
A-A DO2: 56.4 MMHG
ALBUMIN SERPL-MCNC: 3.6 G/DL (ref 3.5–5.2)
ANION GAP SERPL CALCULATED.3IONS-SCNC: 11 MMOL/L (ref 5–15)
ARTERIAL PATENCY WRIST A: ABNORMAL
ARTERIAL PATENCY WRIST A: ABNORMAL
ATMOSPHERIC PRESS: 743 MMHG
ATMOSPHERIC PRESS: 743 MMHG
BASE EXCESS BLDA CALC-SCNC: -5 MMOL/L (ref 0–2)
BASE EXCESS BLDA CALC-SCNC: -5.2 MMOL/L (ref 0–2)
BASOPHILS # BLD AUTO: 0.11 10*3/MM3 (ref 0–0.2)
BASOPHILS NFR BLD AUTO: 0.9 % (ref 0–1.5)
BDY SITE: ABNORMAL
BDY SITE: ABNORMAL
BODY TEMPERATURE: 37 C
BODY TEMPERATURE: 37 C
BUN SERPL-MCNC: 16 MG/DL (ref 8–23)
BUN/CREAT SERPL: 11 (ref 7–25)
CA-I BLD-MCNC: 4.8 MG/DL (ref 4.6–5.4)
CA-I BLD-MCNC: 4.83 MG/DL (ref 4.6–5.4)
CALCIUM SPEC-SCNC: 8.5 MG/DL (ref 8.6–10.5)
CHLORIDE SERPL-SCNC: 110 MMOL/L (ref 98–107)
CO2 SERPL-SCNC: 20 MMOL/L (ref 22–29)
COHGB MFR BLD: 1.2 % (ref 0–5)
CREAT SERPL-MCNC: 1.45 MG/DL (ref 0.76–1.27)
DEPRECATED RDW RBC AUTO: 48.1 FL (ref 37–54)
EGFRCR SERPLBLD CKD-EPI 2021: 50.3 ML/MIN/1.73
EOSINOPHIL # BLD AUTO: 0.7 10*3/MM3 (ref 0–0.4)
EOSINOPHIL NFR BLD AUTO: 5.7 % (ref 0.3–6.2)
ERYTHROCYTE [DISTWIDTH] IN BLOOD BY AUTOMATED COUNT: 13.5 % (ref 12.3–15.4)
GLUCOSE BLDC GLUCOMTR-MCNC: 134 MG/DL (ref 70–130)
GLUCOSE BLDC GLUCOMTR-MCNC: 136 MG/DL (ref 70–130)
GLUCOSE BLDC GLUCOMTR-MCNC: 141 MG/DL (ref 70–130)
GLUCOSE BLDC GLUCOMTR-MCNC: 162 MG/DL (ref 70–130)
GLUCOSE BLDC GLUCOMTR-MCNC: 164 MG/DL (ref 70–130)
GLUCOSE BLDC GLUCOMTR-MCNC: 168 MG/DL (ref 70–130)
GLUCOSE BLDC GLUCOMTR-MCNC: 189 MG/DL (ref 70–130)
GLUCOSE SERPL-MCNC: 166 MG/DL (ref 65–99)
HCO3 BLDA-SCNC: 20.6 MMOL/L (ref 20–26)
HCO3 BLDA-SCNC: 20.9 MMOL/L (ref 20–26)
HCT VFR BLD AUTO: 31.5 % (ref 37.5–51)
HCT VFR BLD CALC: 31.4 % (ref 38–51)
HGB BLD-MCNC: 10 G/DL (ref 13–17.7)
HGB BLDA-MCNC: 10.3 G/DL (ref 14–18)
IMM GRANULOCYTES # BLD AUTO: 0.07 10*3/MM3 (ref 0–0.05)
IMM GRANULOCYTES NFR BLD AUTO: 0.6 % (ref 0–0.5)
INHALED O2 CONCENTRATION: 30 %
INHALED O2 CONCENTRATION: 30 %
INR PPP: 1.1 (ref 0.91–1.09)
LYMPHOCYTES # BLD AUTO: 1.76 10*3/MM3 (ref 0.7–3.1)
LYMPHOCYTES NFR BLD AUTO: 14.3 % (ref 19.6–45.3)
Lab: ABNORMAL
Lab: ABNORMAL
Lab: NORMAL
MAGNESIUM SERPL-MCNC: 1.4 MG/DL (ref 1.6–2.4)
MCH RBC QN AUTO: 30.5 PG (ref 26.6–33)
MCHC RBC AUTO-ENTMCNC: 31.7 G/DL (ref 31.5–35.7)
MCV RBC AUTO: 96 FL (ref 79–97)
METHGB BLD QL: 0.7 % (ref 0–3)
MODALITY: ABNORMAL
MODALITY: ABNORMAL
MONOCYTES # BLD AUTO: 0.98 10*3/MM3 (ref 0.1–0.9)
MONOCYTES NFR BLD AUTO: 7.9 % (ref 5–12)
NEUTROPHILS NFR BLD AUTO: 70.6 % (ref 42.7–76)
NEUTROPHILS NFR BLD AUTO: 8.73 10*3/MM3 (ref 1.7–7)
NOTE: ABNORMAL
NRBC BLD AUTO-RTO: 0 /100 WBC (ref 0–0.2)
OXYHGB MFR BLDV: 97 % (ref 94–99)
PCO2 BLDA: 39.3 MM HG (ref 35–45)
PCO2 BLDA: 42.2 MM HG (ref 35–45)
PCO2 TEMP ADJ BLD: 39.3 MM HG (ref 35–45)
PCO2 TEMP ADJ BLD: 42.2 MM HG (ref 35–45)
PEEP RESPIRATORY: 5 CM[H2O]
PEEP RESPIRATORY: 5 CM[H2O]
PH BLDA: 7.3 PH UNITS (ref 7.35–7.45)
PH BLDA: 7.33 PH UNITS (ref 7.35–7.45)
PH, TEMP CORRECTED: 7.3 PH UNITS (ref 7.35–7.45)
PH, TEMP CORRECTED: 7.33 PH UNITS (ref 7.35–7.45)
PHOSPHATE SERPL-MCNC: 3.5 MG/DL (ref 2.5–4.5)
PLATELET # BLD AUTO: 232 10*3/MM3 (ref 140–450)
PMV BLD AUTO: 10.9 FL (ref 6–12)
PO2 BLDA: 102 MM HG (ref 83–108)
PO2 BLDA: 109 MM HG (ref 83–108)
PO2 TEMP ADJ BLD: 102 MM HG (ref 83–108)
PO2 TEMP ADJ BLD: 109 MM HG (ref 83–108)
POTASSIUM BLDA-SCNC: 4.3 MMOL/L (ref 3.5–5.2)
POTASSIUM SERPL-SCNC: 4.7 MMOL/L (ref 3.5–5.2)
PROTHROMBIN TIME: 14.4 SECONDS (ref 11.8–14.8)
PSV: 10 CMH2O
PSV: 10 CMH2O
QT INTERVAL: 430 MS
QT INTERVAL: 538 MS
QTC INTERVAL: 486 MS
QTC INTERVAL: 523 MS
RBC # BLD AUTO: 3.28 10*6/MM3 (ref 4.14–5.8)
SAO2 % BLDCOA: 98.1 % (ref 94–99)
SAO2 % BLDCOA: 98.9 % (ref 94–99)
SET MECH RESP RATE: 16
SODIUM BLDA-SCNC: 140 MMOL/L (ref 136–145)
SODIUM SERPL-SCNC: 141 MMOL/L (ref 136–145)
VENTILATOR MODE: ABNORMAL
VENTILATOR MODE: ABNORMAL
VT ON VENT VENT: 550 ML
WBC NRBC COR # BLD: 12.35 10*3/MM3 (ref 3.4–10.8)

## 2023-01-04 PROCEDURE — 0 POTASSIUM CHLORIDE PER 2 MEQ: Performed by: SURGERY

## 2023-01-04 PROCEDURE — 94761 N-INVAS EAR/PLS OXIMETRY MLT: CPT

## 2023-01-04 PROCEDURE — 71045 X-RAY EXAM CHEST 1 VIEW: CPT

## 2023-01-04 PROCEDURE — 85025 COMPLETE CBC W/AUTO DIFF WBC: CPT | Performed by: SURGERY

## 2023-01-04 PROCEDURE — 25010000002 ONDANSETRON PER 1 MG: Performed by: SURGERY

## 2023-01-04 PROCEDURE — 82375 ASSAY CARBOXYHB QUANT: CPT

## 2023-01-04 PROCEDURE — 93005 ELECTROCARDIOGRAM TRACING: CPT | Performed by: SURGERY

## 2023-01-04 PROCEDURE — 85610 PROTHROMBIN TIME: CPT | Performed by: SURGERY

## 2023-01-04 PROCEDURE — 99024 POSTOP FOLLOW-UP VISIT: CPT | Performed by: SURGERY

## 2023-01-04 PROCEDURE — 94799 UNLISTED PULMONARY SVC/PX: CPT

## 2023-01-04 PROCEDURE — 80069 RENAL FUNCTION PANEL: CPT | Performed by: SURGERY

## 2023-01-04 PROCEDURE — 97116 GAIT TRAINING THERAPY: CPT

## 2023-01-04 PROCEDURE — 25010000002 ENOXAPARIN PER 10 MG: Performed by: SURGERY

## 2023-01-04 PROCEDURE — 82330 ASSAY OF CALCIUM: CPT

## 2023-01-04 PROCEDURE — 93010 ELECTROCARDIOGRAM REPORT: CPT | Performed by: INTERNAL MEDICINE

## 2023-01-04 PROCEDURE — 83735 ASSAY OF MAGNESIUM: CPT | Performed by: SURGERY

## 2023-01-04 PROCEDURE — 83050 HGB METHEMOGLOBIN QUAN: CPT

## 2023-01-04 PROCEDURE — 97162 PT EVAL MOD COMPLEX 30 MIN: CPT

## 2023-01-04 PROCEDURE — 25010000002 CEFAZOLIN PER 500 MG: Performed by: SURGERY

## 2023-01-04 PROCEDURE — 94664 DEMO&/EVAL PT USE INHALER: CPT

## 2023-01-04 PROCEDURE — 0 MAGNESIUM SULFATE 4 GM/100ML SOLUTION: Performed by: NURSE PRACTITIONER

## 2023-01-04 PROCEDURE — P9041 ALBUMIN (HUMAN),5%, 50ML: HCPCS

## 2023-01-04 PROCEDURE — 82803 BLOOD GASES ANY COMBINATION: CPT

## 2023-01-04 PROCEDURE — 82962 GLUCOSE BLOOD TEST: CPT

## 2023-01-04 PROCEDURE — 82805 BLOOD GASES W/O2 SATURATION: CPT

## 2023-01-04 PROCEDURE — 25010000002 ALBUMIN HUMAN 5% PER 50 ML

## 2023-01-04 RX ORDER — ALBUMIN, HUMAN INJ 5% 5 %
500 SOLUTION INTRAVENOUS ONCE
Status: COMPLETED | OUTPATIENT
Start: 2023-01-04 | End: 2023-01-04

## 2023-01-04 RX ORDER — CALCIUM CHLORIDE 100 MG/ML
1 INJECTION INTRAVENOUS; INTRAVENTRICULAR ONCE
Status: COMPLETED | OUTPATIENT
Start: 2023-01-04 | End: 2023-01-04

## 2023-01-04 RX ORDER — COLCHICINE 0.6 MG/1
0.6 TABLET ORAL DAILY
COMMUNITY

## 2023-01-04 RX ORDER — METHOCARBAMOL 500 MG/1
500 TABLET, FILM COATED ORAL EVERY 8 HOURS SCHEDULED
Status: DISCONTINUED | OUTPATIENT
Start: 2023-01-04 | End: 2023-01-08 | Stop reason: HOSPADM

## 2023-01-04 RX ORDER — MAGNESIUM SULFATE HEPTAHYDRATE 40 MG/ML
4 INJECTION, SOLUTION INTRAVENOUS ONCE
Status: COMPLETED | OUTPATIENT
Start: 2023-01-04 | End: 2023-01-04

## 2023-01-04 RX ORDER — ALBUMIN, HUMAN INJ 5% 5 %
SOLUTION INTRAVENOUS
Status: COMPLETED
Start: 2023-01-04 | End: 2023-01-04

## 2023-01-04 RX ADMIN — METHOCARBAMOL 500 MG: 500 TABLET, FILM COATED ORAL at 21:16

## 2023-01-04 RX ADMIN — IPRATROPIUM BROMIDE AND ALBUTEROL SULFATE 3 ML: 2.5; .5 SOLUTION RESPIRATORY (INHALATION) at 14:09

## 2023-01-04 RX ADMIN — CHLORHEXIDINE GLUCONATE 15 ML: 1.2 SOLUTION ORAL at 20:50

## 2023-01-04 RX ADMIN — IPRATROPIUM BROMIDE AND ALBUTEROL SULFATE 3 ML: 2.5; .5 SOLUTION RESPIRATORY (INHALATION) at 10:00

## 2023-01-04 RX ADMIN — ACETAMINOPHEN 650 MG: 325 TABLET, FILM COATED ORAL at 21:14

## 2023-01-04 RX ADMIN — ALBUMIN (HUMAN) 500 ML: 12.5 INJECTION, SOLUTION INTRAVENOUS at 04:25

## 2023-01-04 RX ADMIN — CEFAZOLIN 2 G: 2 INJECTION, POWDER, FOR SOLUTION INTRAMUSCULAR; INTRAVENOUS at 06:17

## 2023-01-04 RX ADMIN — BISACODYL 10 MG: 5 TABLET ORAL at 08:04

## 2023-01-04 RX ADMIN — ACETAMINOPHEN 650 MG: 325 TABLET, FILM COATED ORAL at 09:44

## 2023-01-04 RX ADMIN — CEFAZOLIN 2 G: 2 INJECTION, POWDER, FOR SOLUTION INTRAMUSCULAR; INTRAVENOUS at 15:42

## 2023-01-04 RX ADMIN — POTASSIUM CHLORIDE 20 MEQ: 29.8 INJECTION, SOLUTION INTRAVENOUS at 00:04

## 2023-01-04 RX ADMIN — ACETAMINOPHEN 1000 MG: 10 INJECTION, SOLUTION INTRAVENOUS at 03:19

## 2023-01-04 RX ADMIN — ACETAMINOPHEN 650 MG: 325 TABLET, FILM COATED ORAL at 15:42

## 2023-01-04 RX ADMIN — OXYCODONE HYDROCHLORIDE 10 MG: 5 TABLET ORAL at 08:33

## 2023-01-04 RX ADMIN — METHOCARBAMOL 500 MG: 500 TABLET, FILM COATED ORAL at 15:42

## 2023-01-04 RX ADMIN — CALCIUM CHLORIDE 1 G: 100 INJECTION INTRAVENOUS; INTRAVENTRICULAR at 06:49

## 2023-01-04 RX ADMIN — Medication 1 APPLICATION: at 20:50

## 2023-01-04 RX ADMIN — CALCITRIOL 0.25 MCG: 0.25 CAPSULE ORAL at 08:06

## 2023-01-04 RX ADMIN — OXYCODONE HYDROCHLORIDE 10 MG: 5 TABLET ORAL at 12:26

## 2023-01-04 RX ADMIN — Medication 1 APPLICATION: at 08:04

## 2023-01-04 RX ADMIN — BISACODYL 10 MG: 5 TABLET ORAL at 20:50

## 2023-01-04 RX ADMIN — METOPROLOL TARTRATE 12.5 MG: 25 TABLET, FILM COATED ORAL at 20:50

## 2023-01-04 RX ADMIN — POTASSIUM CHLORIDE 20 MEQ: 29.8 INJECTION, SOLUTION INTRAVENOUS at 01:03

## 2023-01-04 RX ADMIN — POLYETHYLENE GLYCOL 3350 17 G: 17 POWDER, FOR SOLUTION ORAL at 08:04

## 2023-01-04 RX ADMIN — MAGNESIUM SULFATE HEPTAHYDRATE 4 G: 40 INJECTION, SOLUTION INTRAVENOUS at 07:53

## 2023-01-04 RX ADMIN — ASPIRIN 162 MG: 81 TABLET, CHEWABLE ORAL at 08:04

## 2023-01-04 RX ADMIN — SODIUM CHLORIDE, POTASSIUM CHLORIDE, SODIUM LACTATE AND CALCIUM CHLORIDE 1000 ML: 600; 310; 30; 20 INJECTION, SOLUTION INTRAVENOUS at 02:14

## 2023-01-04 RX ADMIN — CEFAZOLIN 2 G: 2 INJECTION, POWDER, FOR SOLUTION INTRAMUSCULAR; INTRAVENOUS at 23:12

## 2023-01-04 RX ADMIN — METOPROLOL TARTRATE 12.5 MG: 25 TABLET, FILM COATED ORAL at 08:05

## 2023-01-04 RX ADMIN — ONDANSETRON 4 MG: 2 INJECTION INTRAMUSCULAR; INTRAVENOUS at 07:12

## 2023-01-04 RX ADMIN — ALBUMIN, HUMAN INJ 5% 500 ML: 5 SOLUTION at 04:25

## 2023-01-04 RX ADMIN — OXYCODONE HYDROCHLORIDE 10 MG: 5 TABLET ORAL at 03:19

## 2023-01-04 RX ADMIN — ENOXAPARIN SODIUM 40 MG: 100 INJECTION SUBCUTANEOUS at 08:04

## 2023-01-04 RX ADMIN — OXYCODONE HYDROCHLORIDE 10 MG: 5 TABLET ORAL at 21:55

## 2023-01-04 RX ADMIN — OXYCODONE HYDROCHLORIDE 10 MG: 5 TABLET ORAL at 17:57

## 2023-01-04 RX ADMIN — ATORVASTATIN CALCIUM 20 MG: 10 TABLET, FILM COATED ORAL at 20:50

## 2023-01-04 RX ADMIN — CLOPIDOGREL 75 MG: 75 TABLET, FILM COATED ORAL at 17:57

## 2023-01-04 RX ADMIN — FEBUXOSTAT 40 MG: 40 TABLET, FILM COATED ORAL at 08:06

## 2023-01-04 RX ADMIN — PANTOPRAZOLE SODIUM 40 MG: 40 TABLET, DELAYED RELEASE ORAL at 06:17

## 2023-01-04 RX ADMIN — IPRATROPIUM BROMIDE AND ALBUTEROL SULFATE 3 ML: 2.5; .5 SOLUTION RESPIRATORY (INHALATION) at 19:39

## 2023-01-04 RX ADMIN — IPRATROPIUM BROMIDE AND ALBUTEROL SULFATE 3 ML: 2.5; .5 SOLUTION RESPIRATORY (INHALATION) at 06:32

## 2023-01-04 NOTE — PLAN OF CARE
Goal Outcome Evaluation:   Patient stood and ambulated 200' with CGA. HR 79 -91 -84. RA SATs 94-95%. Pain 40 minutes after receiving pain meds at 8 /10. Reviewed sternal precautions.

## 2023-01-04 NOTE — PLAN OF CARE
Goal Outcome Evaluation:  Plan of Care Reviewed With: patient           Outcome Evaluation: PT eval complete. He is alert and oriented. He was educated on sternal precautions. He needs CGA/min assist x 1 to stand and to ambulate. He ambulates 200 ft around the unit, good pace. Rates his pain 5/10 post activity.  with activity. He is a very independent patient prior to admit and pleasant to work with therapy today. PT will cont to progress his independence with gait, balance and strength., Anticipate he will cont to recover and d.c home w spouse.

## 2023-01-04 NOTE — CASE MANAGEMENT/SOCIAL WORK
Discharge Planning Assessment  [facility]     Patient Name: Gallo Philip  MRN: [id number]  Today's Date: 1/4/2023    Admit Date: 1/3/2023        Discharge Needs Assessment     Row Name 01/04/23 1004       Living Environment    People in Home spouse    Name(s) of People in Home Spouse - Mitzy Philip    Current Living Arrangements home    Primary Care Provided by self    Provides Primary Care For no one    Family Caregiver if Needed spouse    Quality of Family Relationships supportive;helpful;involved    Able to Return to Prior Arrangements yes       Resource/Environmental Concerns    Resource/Environmental Concerns none    Transportation Concerns none       Transition Planning    Patient/Family Anticipates Transition to home with family    Transportation Anticipated family or friend will provide       Discharge Needs Assessment    Readmission Within the Last 30 Days no previous admission in last 30 days    Equipment Currently Used at Home none    Concerns to be Addressed no discharge needs identified    Anticipated Changes Related to Illness none    Equipment Needed After Discharge none    Discharge Coordination/Progress Patient resides at home with his spouse.  Patient has a PCP and RX coverage.  Patient plans to return home upon discharge with no anticipated needs.               Discharge Plan    No documentation.               Continued Care and Services - Admitted Since 1/3/2023    Coordination has not been started for this encounter.          Demographic Summary    No documentation.                Functional Status    No documentation.                Psychosocial    No documentation.                Abuse/Neglect    No documentation.                Legal    No documentation.                Substance Abuse    No documentation.                Patient Forms    No documentation.                   VIRGINIA Duarte

## 2023-01-04 NOTE — THERAPY EVALUATION
Patient Name: Gallo Philip  : 1945    MRN: [id number]                              Today's Date: 2023       Admit Date: 1/3/2023    Visit Dx:     ICD-10-CM ICD-9-CM   1. Coronary artery disease involving native coronary artery of native heart without angina pectoris  I25.10 414.01   2. Impaired mobility  Z74.09 799.89     Patient Active Problem List   Diagnosis   • Hypertension   • Shortness of breath   • Family history of early CAD   • Transient global amnesia   • Thoracic outlet syndrome   • Coronary artery disease involving native coronary artery of native heart without angina pectoris   • PVC (premature ventricular contraction)   • Skipped heart beats   • Chronic combined systolic and diastolic heart failure (HCC)   • Hyperlipidemia LDL goal <70   • Stage 3b chronic kidney disease (HCC)   • Class 1 obesity due to excess calories with serious comorbidity and body mass index (BMI) of 30.0 to 30.9 in adult   • Overweight (BMI 25.0-29.9)   • Former smoker   • Coronary artery disease involving coronary bypass graft of native heart without angina pectoris     Past Medical History:   Diagnosis Date   • JAZ (acute kidney injury) ([facility])    • Bleeding ulcer    • Chronic combined systolic and diastolic heart failure (HCC) 2022   • Coronary artery disease involving native coronary artery of native heart without angina pectoris 2022   • Hyperlipidemia LDL goal <70 2022   • Hypertension    • Hypertensive nephrosclerosis    • Shortness of breath    • Stage 3b chronic kidney disease (HCC) 2022   • Stroke ([facility])     2013 - ON PLAVIX     Past Surgical History:   Procedure Laterality Date   • APPENDECTOMY     • CARDIAC CATHETERIZATION Left 2022    Procedure: Cardiac Catheterization/Vascular Study;  Surgeon: Hero Cash MD;  Location: [facility] CATH INVASIVE LOC[facility];  Service: Cardiology;  Laterality: Left;   • CORONARY ARTERY BYPASS GRAFT N/A 1/3/2023    Procedure: CORONARY ARTERY BYPASS  GRAFTING X 1, LEFT INTERNAL MAMMARY ARTERY GRAFT, OFF-PUMP;  Surgeon: Keith Griffin MD;  Location: [facility] OR;  Service: Cardiothoracic;  Laterality: N/A;   • KIDNEY STONE SURGERY     • REPLACEMENT TOTAL KNEE Left 2018      General Information     Row Name 01/04/23 0757          Physical Therapy Time and Intention    Document Type evaluation  s/p 1/3 Off pump CABG x 1.  -MINA     Mode of Treatment physical therapy  -MINA     Row Name 01/04/23 0757          General Information    Patient Profile Reviewed yes  -MINA     Prior Level of Function independent:;all household mobility;ADL's;work  -MINA     Existing Precautions/Restrictions fall;oxygen therapy device and L/min;sternal  chest tube x 2  -MINA     Barriers to Rehab medically complex  -MINA     Row Name 01/04/23 0757          Living Environment    People in Home spouse  -MINA     Row Name 01/04/23 [facility]7          Home Main Entrance    Number of Stairs, Main Entrance none  -MINA     Row Name 01/04/23 0757          Stairs Within Home, Primary    Number of Stairs, Within Home, Primary four  -MINA     Row Name 01/04/23 [facility]7          Cognition    Orientation Status (Cognition) oriented x 4  -MINA     Row Name 01/04/23 0757          Safety Issues, Functional Mobility    Impairments Affecting Function (Mobility) strength;pain;endurance/activity tolerance  -MINA           User Key  (r) = Recorded By, (t) = Taken By, (c) = Cosigned By    Initials Name Provider Type    Parveen Carrizales, PT DPT Physical Therapist               Mobility     Row Name 01/04/23 0757          Bed Mobility    Comment, (Bed Mobility) In chair  -MINA     Row Name 01/04/23 0757          Sit-Stand Transfer    Sit-Stand [place] (Transfers) contact guard;minimum assist (75% patient effort)  -MINA     Row Name 01/04/23 0757          Gait/Stairs (Locomotion)    [place] Level (Gait) contact guard;minimum assist (75% patient effort)  -MINA     Distance in Feet (Gait) 200 ft  -MINA     Comment, (Gait/Stairs) good pace with  gait  -MINA           User Key  (r) = Recorded By, (t) = Taken By, (c) = Cosigned By    Initials Name Provider Type    Parveen Carrizales PT DPT Physical Therapist               Obj/Interventions     Row Name 01/04/23 0757          Range of Motion Comprehensive    General Range of Motion bilateral lower extremity ROM WFL  -MINA     Row Name 01/04/23 0757          Strength Comprehensive (MMT)    Comment, General Manual Muscle Testing (MMT) Assessment B LE grossly 4-/5  -MINA     Row Name 01/04/23 0757          Balance    Balance Assessment sitting dynamic balance;standing dynamic balance  -MINA     Dynamic Sitting Balance supervision  -MINA     Position, Sitting Balance unsupported;sitting in chair  -MINA     Dynamic Standing Balance contact guard;minimal assist  -MINA     Position/Device Used, Standing Balance supported  -MINA           User Key  (r) = Recorded By, (t) = Taken By, (c) = Cosigned By    Initials Name Provider Type    Parveen Carrizales, PT DPT Physical Therapist               Goals/Plan     Row Name 01/04/23 0757          Bed Mobility Goal 1 (PT)    Activity/Assistive Device (Bed Mobility Goal 1, PT) sit to supine/supine to sit  -MINA     [place] Level/Cues Needed (Bed Mobility Goal 1, PT) supervision required  -MINA     Time Frame (Bed Mobility Goal 1, PT) long term goal (LTG);10 days  -MINA     Progress/Outcomes (Bed Mobility Goal 1, PT) new goal  -MINA     Row Name 01/04/23 0757          Transfer Goal 1 (PT)    Activity/Assistive Device (Transfer Goal 1, PT) sit-to-stand/stand-to-sit;bed-to-chair/chair-to-bed  -MINA     [place] Level/Cues Needed (Transfer Goal 1, PT) supervision required  -MINA     Time Frame (Transfer Goal 1, PT) long term goal (LTG);10 days  -MINA     Progress/Outcome (Transfer Goal 1, PT) new goal  -MINA     Row Name 01/04/23 0757          Gait Training Goal 1 (PT)    Activity/Assistive Device (Gait Training Goal 1, PT) gait (walking locomotion);decrease fall risk;improve balance and  speed;increase endurance/gait distance  -MINA     [place] Level (Gait Training Goal 1, PT) supervision required  -MINA     Distance (Gait Training Goal 1, PT) 250 ft  -MINA     Time Frame (Gait Training Goal 1, PT) long term goal (LTG);10 days  -MINA     Progress/Outcome (Gait Training Goal 1, PT) new goal  -MINA     Row Name 01/04/23 0757          Stairs Goal 1 (PT)    Activity/Assistive Device (Stairs Goal 1, PT) ascending stairs;descending stairs  -MINA     [place] Level/Cues Needed (Stairs Goal 1, PT) supervision required  -MINA     Number of Stairs (Stairs Goal 1, PT) 4 steps  -MINA     Time Frame (Stairs Goal 1, PT) long term goal (LTG);10 days  -MINA     Progress/Outcome (Stairs Goal 1, PT) new goal  -MINA     Row Name 01/04/23 0757          Therapy Assessment/Plan (PT)    Planned Therapy Interventions (PT) bed mobility training;transfer training;gait training;balance training;home exercise program;patient/family education;postural re-education;stair training;strengthening  -MINA           User Key  (r) = Recorded By, (t) = Taken By, (c) = Cosigned By    Initials Name Provider Type    Parveen Carrizales, PT DPT Physical Therapist               Clinical Impression     Row Name 01/04/23 0757          Pain    Pretreatment Pain Rating 5/10  -MINA     Pain Location incisional  -MINA     Pain Location - chest  -MINA     Pain Intervention(s) Repositioned;Ambulation/increased activity  -MINA     Row Name 01/04/23 0757          Plan of Care Review    Plan of Care Reviewed With patient  -MINA     Outcome Evaluation PT eval complete. He is alert and oriented. He was educated on sternal precautions. He needs CGA/min assist x 1 to stand and to ambulate. He ambulates 200 ft around the unit, good pace. Rates his pain 5/10 post activity.  with activity. He is a very independent patient prior to admit and pleasant to work with therapy today. PT will cont to progress his independence with gait, balance and strength., Anticipate he will cont  to recover and d.c home w spouse.  -MINA     Row Name 01/04/23 0757          Therapy Assessment/Plan (PT)    Patient/Family Therapy Goals Statement (PT) decrease pain  -MINA     Rehab Potential (PT) good, to achieve stated therapy goals  -MINA     Criteria for Skilled Interventions Met (PT) yes;meets criteria;skilled treatment is necessary  -MINA     Therapy Frequency (PT) 2 times/day  -MINA     Predicted Duration of Therapy Intervention (PT) until d/c  -MINA     Row Name 01/04/23 0757          Vital Signs    Pre Systolic BP Rehab 106  -MINA     Pre Treatment Diastolic BP 63  -MINA     Post Systolic BP Rehab 115  -MINA     Post Treatment Diastolic BP 67  -MINA     Pretreatment Heart Rate (beats/min) 84  -MINA     Intratreatment Heart Rate (beats/min) 100  -MINA     Posttreatment Heart Rate (beats/min) 90  -MINA     Pre SpO2 (%) 96  -MINA     O2 Delivery Pre Treatment supplemental O2  -MINA     O2 Delivery Intra Treatment supplemental O2  -MINA     Post SpO2 (%) 94  -MINA     O2 Delivery Post Treatment supplemental O2  -MINA     Pre Patient Position Sitting  -MINA     Intra Patient Position Standing  -MINA     Post Patient Position Sitting  -MINA     Row Name 01/04/23 0757          Positioning and Restraints    Pre-Treatment Position sitting in chair/recliner  -MINA     Post Treatment Position chair  -MINA     In Chair notified nsg;reclined;call light within reach;encouraged to call for assist;RUE elevated;LUE elevated;patient within staff view  -MINA           User Key  (r) = Recorded By, (t) = Taken By, (c) = Cosigned By    Initials Name Provider Type    Parveen Carrizales, PT DPT Physical Therapist               Outcome Measures     Row Name 01/04/23 0757          How much help from another person do you currently need...    Turning from your back to your side while in flat bed without using bedrails? 3  -MINA     Moving from lying on back to sitting on the side of a flat bed without bedrails? 3  -MINA     Moving to and from a bed to a chair (including a  wheelchair)? 3  -MINA     Standing up from a chair using your arms (e.g., wheelchair, bedside chair)? 3  -MINA     Climbing 3-5 steps with a railing? 2  -MINA     To walk in hospital room? 3  -MINA     AM-PAC 6 Clicks Score (PT) 17  -MINA     Highest level of mobility 5 --> Static standing  -MINA     Row Name 01/04/23 0757          Functional Assessment    Outcome Measure Options AM-PAC 6 Clicks Basic Mobility (PT)  -MINA           User Key  (r) = Recorded By, (t) = Taken By, (c) = Cosigned By    Initials Name Provider Type    Parveen Carrizales, PT DPT Physical Therapist                             Physical Therapy Education     Title: PT OT SLP Therapies (In Progress)     Topic: Physical Therapy (In Progress)     Point: Mobility training (Done)     Learning Progress Summary           Patient Acceptance, E, VU,DU,NR by MINA at 1/4/2023 0757    Comment: Benefits of activity, progression of PT, sternal prec                   Point: Home exercise program (Not Started)     Learner Progress:  Not documented in this visit.          Point: Body mechanics (Not Started)     Learner Progress:  Not documented in this visit.          Point: Precautions (Done)     Learning Progress Summary           Patient Acceptance, E, VU,DU,NR by MINA at 1/4/2023 0757    Comment: Benefits of activity, progression of PT, sternal prec                               User Key     Initials Effective Dates Name Provider Type Effie ENRIQUEZ 08/02/16 -  Parveen Corea, PT DPT Physical Therapist PT              PT Recommendation and Plan  Planned Therapy Interventions (PT): bed mobility training, transfer training, gait training, balance training, home exercise program, patient/family education, postural re-education, stair training, strengthening  Plan of Care Reviewed With: patient  Outcome Evaluation: PT eval complete. He is alert and oriented. He was educated on sternal precautions. He needs CGA/min assist x 1 to stand and to ambulate. He ambulates 200 ft  around the unit, good pace. Rates his pain 5/10 post activity.  with activity. He is a very independent patient prior to admit and pleasant to work with therapy today. PT will cont to progress his independence with gait, balance and strength., Anticipate he will cont to recover and d.c home w spouse.     Time Calculation:    PT Charges     Row Name 01/04/23 0836             Time Calculation    Start Time 0757  -MINA      Stop Time 0835  -MINA      Time Calculation (min) 38 min  -MINA      PT Received On 01/04/23  -MINA      PT Goal Re-Cert Due Date 01/14/23  -MINA            User Key  (r) = Recorded By, (t) = Taken By, (c) = Cosigned By    Initials Name Provider Type    Parveen Carrizales, PT DPT Physical Therapist              Therapy Charges for Today     Code Description Service Date Service Provider Modifiers Qty    58772966120 HC PT EVAL MOD COMPLEXITY 3 1/4/2023 Parveen Corea PT DPT GP 1          PT G-Codes  Outcome Measure Options: AM-PAC 6 Clicks Basic Mobility (PT)  AM-PAC 6 Clicks Score (PT): 17  PT Discharge Summary  Anticipated Discharge Disposition (PT): home with assist    Parveen Corea, PT DPT  1/4/2023

## 2023-01-04 NOTE — PROGRESS NOTES
"Patient name: Gallo Philip  Patient : 1945  VISIT # [id number]  MR #[id number]    Procedure:Procedure(s):  CORONARY ARTERY BYPASS GRAFTING X 1, LEFT INTERNAL MAMMARY ARTERY GRAFT, OFF-PUMP  Procedure Date:1/3/2023  POD:1 Day Post-Op    Subjective     Extubated overnight and tolerating 2 L nasal cannula.  Weight is up 11 pounds.  Creatinine stable at 1.45, patient baseline creatinine is 1.9.  Mag is 1.4.  He remains on low-dose Levophed.  Pain is well controlled.  Due to walk with physical therapy.    Telemetry: Sinus rhythm 80s  IV gtts: Insulin, Levophed, IV fluids       Objective     Visit Vitals  BP 97/55   Pulse 90   Temp 98.5 °F (36.9 °C) (Core)   Resp 16   Ht 177 cm (69.69\")   Wt 96.8 kg (213 lb 6.4 oz)   SpO2 96%   BMI 30.90 kg/m²   Baseline weight 202 pounds    Intake/Output Summary (Last 24 hours) at 2023 0749  Last data filed at 2023 0600  Gross per 24 hour   Intake 7351.51 ml   Output 2382 ml   Net 4969.51 ml     MCT: 530 ml/24 hours, serosanguineous, no air leak  Left pleural: 127 ml/24 hours, serosanguineous, no air leak    Lab:        IMAGES:       Imaging Results (Last 24 Hours)     Procedure Component Value Units Date/Time    XR Chest 1 View [[id number]] Collected: 23     Updated: 23    Narrative:      EXAMINATION: XR CHEST 1 VW- 2023 7:25 AM CST     HISTORY: Post-Op Heart Surgery     REPORT: A frontal view of the chest was obtained.     COMPARISON: Chest x-ray 2023.     The endotracheal tube, right internal jugular pulmonary artery catheter  and mediastinal drains appear in satisfactory position as before. There  is volume loss in the lung bases, slightly increased. There is mild  cardiomegaly. No overt changes of CHF are identified. No pneumothorax or  pleural effusion is identified. Median sternotomy wires are present. No  acute osseous abnormality.       Impression:      Increased atelectasis in the lung bases with mild  cardiomegaly. No evidence " of CHF. The life-support lines appear in good  position as before.  This report was finalized on 01/04/2023 07:27 by Dr. Jimbo Lopez MD.    XR Chest 1 View [[id number]] Collected: 01/03/23 1832     Updated: 01/03/23 1836    Narrative:      EXAMINATION: Chest 1 view 01/03/2023     HISTORY: Postop check line and tube placement     FINDINGS: Today's exam is compared to previous study 12/30/2022. The  patient has undergone median sternotomy for coronary bypass grafting.  Mediastinal drains are in place as well as an endotracheal tube and  Prole-Mk catheter. The tip of the Prole-Mk catheter is in the  pulmonary outflow tract. The endotracheal tube is well-positioned. Lungs  are hypoventilated with mild basilar atelectasis. There is no  pneumothorax.       Impression:      1.. Post median sternotomy with multiple support lines with are  well-positioned. No complications are demonstrated.  2. Mild bibasilar atelectasis.  This report was finalized on 01/03/2023 18:33 by Dr. Gallo Fraser MD.        CXR: Chest tubes in stable position with no pneumothorax.  Hypoventilation with bibasilar atelectasis.      Physical Exam:  General: Alert, oriented. No apparent distress.   Cardiovascular: Regular rate and rhythm without murmur, rubs, or gallops.    Pulmonary: Clear to auscultation bilaterally without wheezing, rubs, or rales.  Chest: Sternotomy incision clean, dry, and intact. Sternum stable. No clicks.  Chest tubes to 20 cm suction. No air leak. Fluid is serosanguineous.   Abdomen: Soft, nondistended, and nontender.  Extremities: Warm, moves all extremities. No edema.   Neurologic:  Grossly intact with no focal deficits.            Impression:  Coronary artery disease  Hypertension  Chronic kidney disease, stage 3B  Former smoker  Overweight, BMI 28        Plan:  Begin bowel regimen  Magnesium 4 g IV x1 dose now  Discontinue insulin drip  Wean Levophed as tolerated  Keep chest tubes today  Routine post cardiac surgery  care  Encourage pulmonary toilet and ambulation  Remain in ICU  Discussed with patient and nursing      CAROL Yoder  01/04/23  07:49 CST

## 2023-01-04 NOTE — SIGNIFICANT NOTE
01/03/23 1745   Readings   PEEP Intrinsic (cm H2O) 11.1 cm H2O   Plateau Pressure (cm H2O) 21 cm H2O   Driving Pressure (cm H2O) 21 cm H2O   Static Compliance (L/cm H2O) 49   Dynamic Compliance (L/cm H2O) 0 L/cm H2O

## 2023-01-04 NOTE — CASE MANAGEMENT/SOCIAL WORK
Discharge Planning Assessment  [facility]     Patient Name: Gallo Philip  MRN: [id number]  Today's Date: 1/4/2023    Admit Date: 1/3/2023        Discharge Needs Assessment     Row Name 01/04/23 1005       Living Environment    People in Home spouse    Name(s) of People in Home Mitzy    Current Living Arrangements home    Primary Care Provided by spouse/significant other    Provides Primary Care For no one    Family Caregiver if Needed spouse    Family Caregiver Names Mitzy    Able to Return to Prior Arrangements yes       Resource/Environmental Concerns    Resource/Environmental Concerns none       Transition Planning    Patient/Family Anticipates Transition to home with family    Transportation Anticipated family or friend will provide       Discharge Needs Assessment    Readmission Within the Last 30 Days no previous admission in last 30 days    Equipment Currently Used at Home none    Concerns to be Addressed no discharge needs identified    Equipment Needed After Discharge none    Discharge Coordination/Progress spoke to patient who lives with spouse; has RX coverage and PCP independent at home prior to illness will follow for DC needs    Row Name 01/04/23 1004       Living Environment    People in Home spouse    Name(s) of People in Home Zahida - Mitzy Philip    Current Living Arrangements home    Primary Care Provided by self    Provides Primary Care For no one    Family Caregiver if Needed spouse    Quality of Family Relationships supportive;helpful;involved    Able to Return to Prior Arrangements yes       Resource/Environmental Concerns    Resource/Environmental Concerns none    Transportation Concerns none       Transition Planning    Patient/Family Anticipates Transition to home with family    Transportation Anticipated family or friend will provide       Discharge Needs Assessment    Readmission Within the Last 30 Days no previous admission in last 30 days    Equipment Currently Used at Home none     Concerns to be Addressed no discharge needs identified    Anticipated Changes Related to Illness none    Equipment Needed After Discharge none    Discharge Coordination/Progress Patient resides at home with his spouse.  Patient has a PCP and RX coverage.  Patient plans to return home upon discharge with no anticipated needs.               Discharge Plan    No documentation.               Continued Care and Services - Admitted Since 1/3/2023    Coordination has not been started for this encounter.          Demographic Summary    No documentation.                Functional Status    No documentation.                Psychosocial    No documentation.                Abuse/Neglect    No documentation.                Legal    No documentation.                Substance Abuse    No documentation.                Patient Forms    No documentation.                   Antonieta Wilkins RN

## 2023-01-05 ENCOUNTER — APPOINTMENT (OUTPATIENT)
Dept: GENERAL RADIOLOGY | Facility: HOSPITAL | Age: 78
DRG: 236 | End: 2023-01-05
Payer: MEDICARE

## 2023-01-05 LAB
ANION GAP SERPL CALCULATED.3IONS-SCNC: 7 MMOL/L (ref 5–15)
BUN SERPL-MCNC: 16 MG/DL (ref 8–23)
BUN/CREAT SERPL: 10.3 (ref 7–25)
CALCIUM SPEC-SCNC: 8.5 MG/DL (ref 8.6–10.5)
CHLORIDE SERPL-SCNC: 106 MMOL/L (ref 98–107)
CO2 SERPL-SCNC: 22 MMOL/L (ref 22–29)
CREAT SERPL-MCNC: 1.55 MG/DL (ref 0.76–1.27)
DEPRECATED RDW RBC AUTO: 50.5 FL (ref 37–54)
EGFRCR SERPLBLD CKD-EPI 2021: 46.4 ML/MIN/1.73
ERYTHROCYTE [DISTWIDTH] IN BLOOD BY AUTOMATED COUNT: 14.1 % (ref 12.3–15.4)
GLUCOSE SERPL-MCNC: 167 MG/DL (ref 65–99)
HCT VFR BLD AUTO: 29.6 % (ref 37.5–51)
HGB BLD-MCNC: 9.3 G/DL (ref 13–17.7)
MCH RBC QN AUTO: 30.9 PG (ref 26.6–33)
MCHC RBC AUTO-ENTMCNC: 31.4 G/DL (ref 31.5–35.7)
MCV RBC AUTO: 98.3 FL (ref 79–97)
PLATELET # BLD AUTO: 181 10*3/MM3 (ref 140–450)
PMV BLD AUTO: 10.6 FL (ref 6–12)
POTASSIUM SERPL-SCNC: 4.5 MMOL/L (ref 3.5–5.2)
RBC # BLD AUTO: 3.01 10*6/MM3 (ref 4.14–5.8)
SODIUM SERPL-SCNC: 135 MMOL/L (ref 136–145)
WBC NRBC COR # BLD: 11.88 10*3/MM3 (ref 3.4–10.8)

## 2023-01-05 PROCEDURE — 94664 DEMO&/EVAL PT USE INHALER: CPT

## 2023-01-05 PROCEDURE — 25010000002 CEFAZOLIN PER 500 MG: Performed by: SURGERY

## 2023-01-05 PROCEDURE — 25010000002 ONDANSETRON PER 1 MG: Performed by: SURGERY

## 2023-01-05 PROCEDURE — 97116 GAIT TRAINING THERAPY: CPT

## 2023-01-05 PROCEDURE — 93010 ELECTROCARDIOGRAM REPORT: CPT | Performed by: HOSPITALIST

## 2023-01-05 PROCEDURE — 93005 ELECTROCARDIOGRAM TRACING: CPT | Performed by: SURGERY

## 2023-01-05 PROCEDURE — 80048 BASIC METABOLIC PNL TOTAL CA: CPT | Performed by: SURGERY

## 2023-01-05 PROCEDURE — 94799 UNLISTED PULMONARY SVC/PX: CPT

## 2023-01-05 PROCEDURE — 25010000002 FUROSEMIDE PER 20 MG: Performed by: NURSE PRACTITIONER

## 2023-01-05 PROCEDURE — 25010000002 ENOXAPARIN PER 10 MG: Performed by: SURGERY

## 2023-01-05 PROCEDURE — 71045 X-RAY EXAM CHEST 1 VIEW: CPT

## 2023-01-05 PROCEDURE — 25810000003 DEXTROSE 5 % WITH KCL 20 MEQ 20-5 MEQ/L-% SOLUTION: Performed by: SURGERY

## 2023-01-05 PROCEDURE — 85027 COMPLETE CBC AUTOMATED: CPT | Performed by: SURGERY

## 2023-01-05 PROCEDURE — 94761 N-INVAS EAR/PLS OXIMETRY MLT: CPT

## 2023-01-05 RX ORDER — BISACODYL 10 MG
10 SUPPOSITORY, RECTAL RECTAL ONCE
Status: COMPLETED | OUTPATIENT
Start: 2023-01-05 | End: 2023-01-05

## 2023-01-05 RX ORDER — CALCIUM CHLORIDE 100 MG/ML
1 INJECTION INTRAVENOUS; INTRAVENTRICULAR ONCE
Status: COMPLETED | OUTPATIENT
Start: 2023-01-05 | End: 2023-01-05

## 2023-01-05 RX ORDER — FUROSEMIDE 10 MG/ML
20 INJECTION INTRAMUSCULAR; INTRAVENOUS ONCE
Status: COMPLETED | OUTPATIENT
Start: 2023-01-05 | End: 2023-01-05

## 2023-01-05 RX ADMIN — FUROSEMIDE 20 MG: 10 INJECTION, SOLUTION INTRAMUSCULAR; INTRAVENOUS at 08:47

## 2023-01-05 RX ADMIN — FEBUXOSTAT 40 MG: 40 TABLET, FILM COATED ORAL at 08:47

## 2023-01-05 RX ADMIN — ACETAMINOPHEN 650 MG: 325 TABLET, FILM COATED ORAL at 21:29

## 2023-01-05 RX ADMIN — OXYCODONE HYDROCHLORIDE 10 MG: 5 TABLET ORAL at 16:11

## 2023-01-05 RX ADMIN — Medication 1 APPLICATION: at 08:48

## 2023-01-05 RX ADMIN — CHLORHEXIDINE GLUCONATE 15 ML: 1.2 SOLUTION ORAL at 08:48

## 2023-01-05 RX ADMIN — CLOPIDOGREL 75 MG: 75 TABLET, FILM COATED ORAL at 17:18

## 2023-01-05 RX ADMIN — POLYETHYLENE GLYCOL 3350 17 G: 17 POWDER, FOR SOLUTION ORAL at 08:46

## 2023-01-05 RX ADMIN — METHOCARBAMOL 500 MG: 500 TABLET, FILM COATED ORAL at 05:32

## 2023-01-05 RX ADMIN — CALCITRIOL 0.25 MCG: 0.25 CAPSULE ORAL at 08:48

## 2023-01-05 RX ADMIN — METOPROLOL TARTRATE 12.5 MG: 25 TABLET, FILM COATED ORAL at 21:30

## 2023-01-05 RX ADMIN — ACETAMINOPHEN 650 MG: 325 TABLET, FILM COATED ORAL at 14:56

## 2023-01-05 RX ADMIN — ENOXAPARIN SODIUM 40 MG: 100 INJECTION SUBCUTANEOUS at 08:47

## 2023-01-05 RX ADMIN — ONDANSETRON 4 MG: 2 INJECTION INTRAMUSCULAR; INTRAVENOUS at 08:56

## 2023-01-05 RX ADMIN — IPRATROPIUM BROMIDE AND ALBUTEROL SULFATE 3 ML: 2.5; .5 SOLUTION RESPIRATORY (INHALATION) at 06:45

## 2023-01-05 RX ADMIN — METHOCARBAMOL 500 MG: 500 TABLET, FILM COATED ORAL at 21:30

## 2023-01-05 RX ADMIN — CHLORHEXIDINE GLUCONATE 15 ML: 1.2 SOLUTION ORAL at 21:30

## 2023-01-05 RX ADMIN — POTASSIUM CHLORIDE AND DEXTROSE MONOHYDRATE 30 ML/HR: 150; 5 INJECTION, SOLUTION INTRAVENOUS at 06:08

## 2023-01-05 RX ADMIN — IPRATROPIUM BROMIDE AND ALBUTEROL SULFATE 3 ML: 2.5; .5 SOLUTION RESPIRATORY (INHALATION) at 13:34

## 2023-01-05 RX ADMIN — ACETAMINOPHEN 650 MG: 325 TABLET, FILM COATED ORAL at 08:48

## 2023-01-05 RX ADMIN — OXYCODONE HYDROCHLORIDE 5 MG: 5 TABLET ORAL at 11:51

## 2023-01-05 RX ADMIN — CALCIUM CHLORIDE 1 G: 100 INJECTION INTRAVENOUS; INTRAVENTRICULAR at 08:48

## 2023-01-05 RX ADMIN — CEFAZOLIN 2 G: 2 INJECTION, POWDER, FOR SOLUTION INTRAMUSCULAR; INTRAVENOUS at 06:13

## 2023-01-05 RX ADMIN — ACETAMINOPHEN 650 MG: 325 TABLET, FILM COATED ORAL at 02:48

## 2023-01-05 RX ADMIN — METHOCARBAMOL 500 MG: 500 TABLET, FILM COATED ORAL at 14:25

## 2023-01-05 RX ADMIN — ASPIRIN 81 MG: 81 TABLET, COATED ORAL at 08:48

## 2023-01-05 RX ADMIN — IPRATROPIUM BROMIDE AND ALBUTEROL SULFATE 3 ML: 2.5; .5 SOLUTION RESPIRATORY (INHALATION) at 20:48

## 2023-01-05 RX ADMIN — Medication 0.04 MCG/KG/MIN: at 00:54

## 2023-01-05 RX ADMIN — IPRATROPIUM BROMIDE AND ALBUTEROL SULFATE 3 ML: 2.5; .5 SOLUTION RESPIRATORY (INHALATION) at 10:30

## 2023-01-05 RX ADMIN — METOPROLOL TARTRATE 12.5 MG: 25 TABLET, FILM COATED ORAL at 08:47

## 2023-01-05 RX ADMIN — BISACODYL 10 MG: 5 TABLET ORAL at 08:47

## 2023-01-05 RX ADMIN — OXYCODONE HYDROCHLORIDE 5 MG: 5 TABLET ORAL at 21:29

## 2023-01-05 RX ADMIN — BISACODYL 10 MG: 10 SUPPOSITORY RECTAL at 09:26

## 2023-01-05 RX ADMIN — BISACODYL 10 MG: 5 TABLET ORAL at 21:30

## 2023-01-05 RX ADMIN — PANTOPRAZOLE SODIUM 40 MG: 40 TABLET, DELAYED RELEASE ORAL at 05:32

## 2023-01-05 RX ADMIN — ATORVASTATIN CALCIUM 20 MG: 10 TABLET, FILM COATED ORAL at 21:30

## 2023-01-05 NOTE — PROGRESS NOTES
"Patient name: Gallo Philip  Patient : 1945  VISIT # [id number]  MR #[id number]    Procedure:Procedure(s):  CORONARY ARTERY BYPASS GRAFTING X 1, LEFT INTERNAL MAMMARY ARTERY GRAFT, OFF-PUMP  Procedure Date:1/3/2023  POD:2 Days Post-Op    Subjective     Tolerating 2 L nasal cannula.  Weight is unchanged today and he is 11 pounds above his baseline weight.  Creatinine today is 1.5.  He has not had a bowel movement.  Walking 200 feet with physical therapy.  Levophed has been paused since 033.      Telemetry: Sinus rhythm 80s  IV gtts: IV fluids       Objective     Visit Vitals  BP 97/55 (BP Location: Right arm, Patient Position: Lying)   Pulse 80   Temp 98.6 °F (37 °C) (Oral)   Resp 18   Ht 177 cm (69.69\")   Wt 96.7 kg (213 lb 3.2 oz)   SpO2 100%   BMI 30.87 kg/m²   Baseline weight 202 pounds    Intake/Output Summary (Last 24 hours) at 2023 0836  Last data filed at 2023 0800  Gross per 24 hour   Intake 2200.09 ml   Output 1900 ml   Net 300.09 ml     MCT: 420 ml/24 hours, serosanguineous, no air leak  Left pleural: 230 ml/24 hours, serosanguineous, no air leak    Lab:        IMAGES:       Imaging Results (Last 24 Hours)     Procedure Component Value Units Date/Time    XR Chest 1 View [[id number]] Collected: 2334     Updated: 23    Narrative:      EXAMINATION: XR CHEST 1 VW-     2023 3:10 AM CST     HISTORY: Post-Op Heart Surgery     A frontal moderately lordotic view of the chest is compared with the  previous study dated 2023.     The lungs are poorly expanded.     There are atelectatic change at bilateral lung bases, right more than  the left. No change.     This small bibasal pleural effusion, right more than the left. There is  loculated pleural effusion/pleural thickening along the lateral chest  wall, left more than the right. No change.     There is no pulmonary congestion or pneumothorax.     There is persistent moderate cardiomegaly. There is evidence of " prior  cardiac surgery.     The endotracheal tube have been removed since the previous study.     A right internal jugular vascular sheath is in place. The [place]-Mk  catheter have been removed. The mediastinal drain is in place.       Impression:      1. Poor lung expansion. Atelectatic changes bilaterally spaces more on  the right side. Small bibasal pleural effusion, more on the right side.  2. Moderate cardiomegaly. Postcardiac surgery changes.        This report was finalized on 01/05/2023 07:37 by Dr. Jose Maria Wilson MD.        CXR: Chest tubes in stable position with no pneumothorax.  Hypoventilation with bibasilar atelectasis, worse on right    Physical Exam:  General: Alert, oriented. No apparent distress. On 2 liters NC  Cardiovascular: Regular rate and rhythm without murmur, rubs, or gallops.    Pulmonary: Clear to auscultation bilaterally without wheezing, rubs, or rales.  Chest: Sternotomy incision clean, dry, and intact. Sternum stable. No clicks.  Chest tubes to 20 cm suction. No air leak. Fluid is serosanguineous.   Abdomen: Soft, nondistended, and nontender.  Extremities: Warm, moves all extremities.  Mild peripheral edema  Neurologic:  Grossly intact with no focal deficits.            Impression:  Coronary artery disease  Hypertension  Chronic kidney disease, stage 3B  Former smoker  Overweight, BMI 28        Plan:  Continue begin bowel regimen, suppository today  Lasix 20 mg x 1 dose this morning  Calcium chloride 1 g x 1 dose this morning  Discontinue Puente catheter  Discontinue IV fluids  Routine post cardiac surgery care  Encourage pulmonary toilet and ambulation  Transfer to   Discussed with patient and nursing      CAROL Yoder  01/05/23  08:36 CST

## 2023-01-05 NOTE — THERAPY TREATMENT NOTE
Acute Care - Physical Therapy Treatment Note  [facility]     Patient Name: Gallo Philip  : 1945  MRN: [id number]  Today's Date: 2023      Visit Dx:     ICD-10-CM ICD-9-CM   1. Coronary artery disease involving native coronary artery of native heart without angina pectoris  I25.10 414.01   2. Impaired mobility  Z74.09 799.89     Patient Active Problem List   Diagnosis   • Hypertension   • Shortness of breath   • Family history of early CAD   • Transient global amnesia   • Thoracic outlet syndrome   • Coronary artery disease involving native coronary artery of native heart without angina pectoris   • PVC (premature ventricular contraction)   • Skipped heart beats   • Chronic combined systolic and diastolic heart failure (HCC)   • Hyperlipidemia LDL goal <70   • Stage 3b chronic kidney disease (HCC)   • Class 1 obesity due to excess calories with serious comorbidity and body mass index (BMI) of 30.0 to 30.9 in adult   • Overweight (BMI 25.0-29.9)   • Former smoker   • Coronary artery disease involving coronary bypass graft of native heart without angina pectoris     Past Medical History:   Diagnosis Date   • JAZ (acute kidney injury) (Formerly [facility])    • Bleeding ulcer    • Chronic combined systolic and diastolic heart failure (HCC) 2022   • Coronary artery disease involving native coronary artery of native heart without angina pectoris 2022   • Hyperlipidemia LDL goal <70 2022   • Hypertension    • Hypertensive nephrosclerosis    • Shortness of breath    • Stage 3b chronic kidney disease (HCC) 2022   • Stroke (Formerly [facility])     2013 - ON [place]     Past Surgical History:   Procedure Laterality Date   • APPENDECTOMY     • CARDIAC CATHETERIZATION Left 2022    Procedure: Cardiac Catheterization/Vascular Study;  Surgeon: Hero Cash MD;  Location: [facility] INVASIVE LOCATION;  Service: Cardiology;  Laterality: Left;   • CORONARY ARTERY BYPASS GRAFT N/A 1/3/2023    Procedure: CORONARY ARTERY  BYPASS GRAFTING X 1, LEFT INTERNAL MAMMARY ARTERY GRAFT, OFF-PUMP;  Surgeon: Keith Griffin MD;  Location: [facility] OR;  Service: Cardiothoracic;  Laterality: N/A;   • KIDNEY STONE SURGERY     • REPLACEMENT TOTAL KNEE Left 2018     PT Assessment (last 12 hours)     PT Evaluation and Treatment     Row Name 01/05/23 1432 01/05/23 0953       Physical Therapy Time and Intention    Subjective Information complains of;pain  -TB complains of;pain  -TB    Document Type therapy note (daily note)  -TB therapy note (daily note)  -TB    Mode of Treatment physical therapy  -TB physical therapy  -TB    Row Name 01/05/23 1432 01/05/23 0953       General Information    Existing Precautions/Restrictions fall  2 chest tubes  -TB fall  2 chest tubes  -TB    Row Name 01/05/23 1432 01/05/23 0953       Pain    Pretreatment Pain Rating 8/10  -TB 6/10  -TB    Posttreatment Pain Rating 8/10  -TB 6/10  -TB    Pain Location -- incisional  -TB    Pain Location - -- chest  -TB    Pre/Posttreatment Pain Comment L side  -TB --    Row Name 01/05/23 1432 01/05/23 0953       Bed Mobility    Comment, (Bed Mobility) In chair  -TB In chair  -TB    Row Name 01/05/23 1432 01/05/23 0953       Transfers    Transfers sit-stand transfer;stand-sit transfer  -TB sit-stand transfer;stand-sit transfer  -TB    Row Name 01/05/23 1432 01/05/23 0953       Sit-Stand Transfer    Sit-Stand L[place] (Transfers) standby assist  -TB standby assist  -TB    Row Name 01/05/23 1432 01/05/23 0953       Stand-Sit Transfer    Stand-Sit [place]yman (Transfers) standby assist  -TB standby assist  -TB    Row Name 01/05/23 1432 01/05/23 0953       Gait/Stairs (Locomotion)    [place]yman Level (Gait) contact guard  -TB contact guard  -TB    Distance in Feet (Gait) 200  -  -TB    Pattern (Gait) step-through  -TB step-through  -TB    Comment, (Gait/Stairs) Increased pain w/ ambulation in his side  -TB Good pace w/ no complaints  -TB    Row Name 01/05/23 1432 01/05/23 0953        Motor Skills    Comments, Therapeutic Exercise Protocol x15  -TB Protocol x15  -TB    Row Name             Wound 01/03/23 1615 midline sternal Incision    Wound - Properties Group Placement Date: 01/03/23  -CO Placement Time: 1615 -CO Orientation: midline  -CO Location: sternal  -CO Primary Wound Type: Incision  -CO    Retired Wound - Properties Group Placement Date: 01/03/23  -CO Placement Time: 1615  -CO Orientation: midline  -CO Location: sternal  -CO Primary Wound Type: Incision  -CO    Retired Wound - Properties Group Date first assessed: 01/03/23  -CO Time first assessed: 1615  -CO Location: sternal  -CO Primary Wound Type: Incision  -CO    Row Name 01/05/23 0953          Plan of Care Review    Plan of Care Reviewed With patient  -TB     Progress improving  -TB     Outcome Evaluation Pt w/ minimal c/o pain in chest. Performed protocol exercises x15. Sit<>stand SBA. Amb 200' w/ CGA. Pt w/ good even gait and no c/o during ambulation. Will cont POC.  -TB     Row Name 01/05/23 1432 01/05/23 0953       Positioning and Restraints    Pre-Treatment Position sitting in chair/recliner  -TB sitting in chair/recliner  -TB    Post Treatment Position chair  -TB chair  -TB    In Chair reclined;sitting;call light within reach;encouraged to call for assist;with family/caregiver;LUE elevated;RUE elevated;legs elevated  -TB reclined;sitting;call light within reach;encouraged to call for assist;LUE elevated;RUE elevated;legs elevated  -TB          User Key  (r) = Recorded By, (t) = Taken By, (c) = Cosigned By    Initials Name Provider Type    CO Trisha Doyle, RN Registered Nurse    Tania Salcido PTA Physical Therapist Assistant                Physical Therapy Education     Title: PT OT SLP Therapies (In Progress)     Topic: Physical Therapy (In Progress)     Point: Mobility training (Done)     Learning Progress Summary           Patient Acceptance, E, SKYE,DU,NR by MINA at 1/4/2023 4292    Comment: Benefits of  activity, progression of PT, sternal prec                   Point: Home exercise program (Not Started)     Learner Progress:  Not documented in this visit.          Point: Body mechanics (Not Started)     Learner Progress:  Not documented in this visit.          Point: Precautions (Done)     Learning Progress Summary           Patient Acceptance, E, SKYE,DU,NR by MINA at 1/4/2023 5162    Comment: Benefits of activity, progression of PT, sternal prec                               User Key     Initials Effective Dates Name Provider Type Discipline    MINA 08/02/16 -  Parveen Corea PT DPT Physical Therapist PT              PT Recommendation and Plan     Plan of Care Reviewed With: patient  Progress: improving  Outcome Evaluation: Pt w/ minimal c/o pain in chest. Performed protocol exercises x15. Sit<>stand SBA. Amb 200' w/ CGA. Pt w/ good even gait and no c/o during ambulation. Will cont POC.   Outcome Measures     Row Name 01/05/23 0953             How much help from another person do you currently need...    Turning from your back to your side while in flat bed without using bedrails? 4  -TB      Moving from lying on back to sitting on the side of a flat bed without bedrails? 4  -TB      Moving to and from a bed to a chair (including a wheelchair)? 3  -TB      Standing up from a chair using your arms (e.g., wheelchair, bedside chair)? 4  -TB      Climbing 3-5 steps with a railing? 3  -TB      To walk in hospital room? 3  -TB      AM-PAC 6 Clicks Score (PT) 21  -TB         Functional Assessment    Outcome Measure Options AM-PAC 6 Clicks Basic Mobility (PT)  -TB            User Key  (r) = Recorded By, (t) = Taken By, (c) = Cosigned By    Initials Name Provider Type    TB Tania Bearden, PTA Physical Therapist Assistant                 Time Calculation:    PT Charges     Row Name 01/05/23 1551 01/05/23 1548          Time Calculation    Start Time 1432  -TB 0953  -TB     Stop Time 1450  -TB 1016  -TB     Time  Calculation (min) 18 min  -TB 23 min  -TB     PT Received On 01/05/23  -TB 01/05/23  -TB        Time Calculation- PT    Total Timed Code Minutes- PT 18 minute(s)  -TB 23 minute(s)  -TB           User Key  (r) = Recorded By, (t) = Taken By, (c) = Cosigned By    Initials Name Provider Type    TB Tania Bearden, ZAC Physical Therapist Assistant              Therapy Charges for Today     Code Description Service Date Service Provider Modifiers Qty    15982283407 HC GAIT TRAINING EA 15 MIN 1/5/2023 Tania Bearden, ZAC GP 2    39904384324 HC GAIT TRAINING EA 15 MIN 1/5/2023 Tania Bearden, PTA GP 1          PT G-Codes  Outcome Measure Options: AM-PAC 6 Clicks Basic Mobility (PT)  AM-PAC 6 Clicks Score (PT): 21    Tania Bearden PTA  1/5/2023

## 2023-01-05 NOTE — NURSING NOTE
Vital signs stable.  Order to transfer.  Puente catheter removed.  Urinated.  CVL removed.  Post op dressing removed.  Report called to Marlena on 4B.  Transferred via wheelchair.

## 2023-01-05 NOTE — PLAN OF CARE
Goal Outcome Evaluation:  Plan of Care Reviewed With: patient        Progress: improving  Outcome Evaluation: Pt w/ minimal c/o pain in chest. Performed protocol exercises x15. Sit<>stand SBA. Amb 200' w/ CGA. Pt w/ good even gait and no c/o during ambulation. Will cont POC.

## 2023-01-05 NOTE — THERAPY TREATMENT NOTE
Acute Care - Physical Therapy Treatment Note  [facility]     Patient Name: Gallo Philip  : 1945  MRN: [id number]  Today's Date: 2023      Visit Dx:     ICD-10-CM ICD-9-CM   1. Coronary artery disease involving native coronary artery of native heart without angina pectoris  I25.10 414.01   2. Impaired mobility  Z74.09 799.89     Patient Active Problem List   Diagnosis   • Hypertension   • Shortness of breath   • Family history of early CAD   • Transient global amnesia   • Thoracic outlet syndrome   • Coronary artery disease involving native coronary artery of native heart without angina pectoris   • PVC (premature ventricular contraction)   • Skipped heart beats   • Chronic combined systolic and diastolic heart failure (HCC)   • Hyperlipidemia LDL goal <70   • Stage 3b chronic kidney disease (HCC)   • Class 1 obesity due to excess calories with serious comorbidity and body mass index (BMI) of 30.0 to 30.9 in adult   • Overweight (BMI 25.0-29.9)   • Former smoker   • Coronary artery disease involving coronary bypass graft of native heart without angina pectoris     Past Medical History:   Diagnosis Date   • JAZ (acute kidney injury) ([facility])    • Bleeding ulcer    • Chronic combined systolic and diastolic heart failure (HCC) 2022   • Coronary artery disease involving native coronary artery of native heart without angina pectoris 2022   • Hyperlipidemia LDL goal <70 2022   • Hypertension    • Hypertensive nephrosclerosis    • Shortness of breath    • Stage 3b chronic kidney disease (HCC) 2022   • Stroke ([facility])     2013 - ON PLAVIX     Past Surgical History:   Procedure Laterality Date   • APPENDECTOMY     • CARDIAC CATHETERIZATION Left 2022    Procedure: Cardiac Catheterization/Vascular Study;  Surgeon: Hero Cash MD;  Location: [facility] INVASIVE LOCATION;  Service: Cardiology;  Laterality: Left;   • CORONARY ARTERY BYPASS GRAFT N/A 1/3/2023    Procedure: CORONARY ARTERY  BYPASS GRAFTING X 1, LEFT INTERNAL MAMMARY ARTERY GRAFT, OFF-PUMP;  Surgeon: Keith Griffin MD;  [facility]: [facility];  Service: Cardiothoracic;  Laterality: N/A;   • KIDNEY STONE SURGERY     • REPLACEMENT TOTAL KNEE Left 2018     PT Assessment (last 12 hours)     PT Evaluation and Treatment     Row Name 01/05/23 0953          Physical Therapy Time and Intention    Subjective Information complains of;pain  -TB     Document Type therapy note (daily note)  -TB     Mode of Treatment physical therapy  -TB     Row Name 01/05/23 0953          General Information    Existing Precautions/Restrictions fall  2 chest tubes  -TB     Row Name 01/05/23 0953          Pain    Pretreatment Pain Rating 6/10  -TB     Posttreatment Pain Rating 6/10  -TB     Pain Location incisional  -TB     Pain Location - chest  -TB     Row Name 01/05/23 0953          Bed Mobility    Comment, (Bed Mobility) In chair  -TB     Row Name 01/05/23 0953          Transfers    Transfers sit-stand transfer;stand-sit transfer  -TB     Row Name 01/05/23 0953          Sit-Stand Transfer    Sit-Stand [place] (Transfers) standby assist  -TB     Row Name 01/05/23 0953          Stand-Sit Transfer    Stand-Sit [place] (Transfers) standby assist  -TB     Row Name 01/05/23 0953          Gait/Stairs (Locomotion)    [place] Level (Gait) contact guard  -TB     Distance in Feet (Gait) 200  -TB     Pattern (Gait) step-through  -TB     Comment, (Gait/Stairs) Good pace w/ no complaints  -TB     Row Name 01/05/23 0953          Motor Skills    Comments, Therapeutic Exercise Protocol x15  -TB     Row Name             Wound 01/03/23 1615 midline sternal Incision    Wound - Properties Group Placement Date: 01/03/23  -CO Placement Time: 1615 -CO Orientation: midline  -CO Location: sternal  -CO Primary Wound Type: Incision  -CO    Retired Wound - Properties Group Placement Date: 01/03/23  -CO Placement Time: 1615 -CO Orientation: midline  -CO Location: sternal  -CO  Primary Wound Type: Incision  -CO    Retired Wound - Properties Group Date first assessed: 01/03/23  -CO Time first assessed: 1615  -CO Location: sternal  -CO Primary Wound Type: Incision  -CO    Row Name 01/05/23 0953          Plan of Care Review    Plan of Care Reviewed With patient  -TB     Progress improving  -TB     Outcome Evaluation Pt w/ minimal c/o pain in chest. Performed protocol exercises x15. Sit<>stand SBA. Amb 200' w/ CGA. Pt w/ good even gait and no c/o during ambulation. Will cont POC.  -TB     Row Name 01/05/23 0953          Positioning and Restraints    Pre-Treatment Position sitting in chair/recliner  -TB     Post Treatment Position chair  -TB     In Chair reclined;sitting;call light within reach;encouraged to call for assist;LUE elevated;RUE elevated;legs elevated  -TB           User Key  (r) = Recorded By, (t) = Taken By, (c) = Cosigned By    Initials Name Provider Type    CO Trisha Doyle, RN Registered Nurse    Tania Salcido, PTA Physical Therapist Assistant                Physical Therapy Education     Title: PT OT SLP Therapies (In Progress)     Topic: Physical Therapy (In Progress)     Point: Mobility training (Done)     Learning Progress Summary           Patient Acceptance, E, VU,DU,NR by MINA at 1/4/2023 0757    Comment: Benefits of activity, progression of PT, sternal prec                   Point: Home exercise program (Not Started)     Learner Progress:  Not documented in this visit.          Point: Body mechanics (Not Started)     Learner Progress:  Not documented in this visit.          Point: Precautions (Done)     Learning Progress Summary           Patient Acceptance, E, VU,DU,NR by MINA at 1/4/2023 0757    Comment: Benefits of activity, progression of PT, sternal prec                               User Key     Initials Effective Dates Name Provider Type Discipline    MINA 08/02/16 -  Parveen Corea PT DPT Physical Therapist PT              PT Recommendation and Plan      Plan of Care Reviewed With: patient  Progress: improving  Outcome Evaluation: Pt w/ minimal c/o pain in chest. Performed protocol exercises x15. Sit<>stand SBA. Amb 200' w/ CGA. Pt w/ good even gait and no c/o during ambulation. Will cont POC.   Outcome Measures     Row Name 01/05/23 0953             How much help from another person do you currently need...    Turning from your back to your side while in flat bed without using bedrails? 4  -TB      Moving from lying on back to sitting on the side of a flat bed without bedrails? 4  -TB      Moving to and from a bed to a chair (including a wheelchair)? 3  -TB      Standing up from a chair using your arms (e.g., wheelchair, bedside chair)? 4  -TB      Climbing 3-5 steps with a railing? 3  -TB      To walk in hospital room? 3  -TB      AM-PAC 6 Clicks Score (PT) 21  -TB         Functional Assessment    Outcome Measure Options AM-PAC 6 Clicks Basic Mobility (PT)  -TB            User Key  (r) = Recorded By, (t) = Taken By, (c) = Cosigned By    Initials Name Provider Type    Tania Salcido PTA Physical Therapist Assistant                 Time Calculation:    PT Charges     Row Name 01/05/23 1548             Time Calculation    Start Time 0953  -TB      Stop Time 1016  -TB      Time Calculation (min) 23 min  -TB      PT Received On 01/05/23  -TB         Time Calculation- PT    Total Timed Code Minutes- PT 23 minute(s)  -TB            User Key  (r) = Recorded By, (t) = Taken By, (c) = Cosigned By    Initials Name Provider Type    Tania Salcido PTA Physical Therapist Assistant              Therapy Charges for Today     Code Description Service Date Service Provider Modifiers Qty    90839208214 HC GAIT TRAINING EA 15 MIN 1/5/2023 Tania Bearden PTA GP 2          PT G-Codes  Outcome Measure Options: AM-PAC 6 Clicks Basic Mobility (PT)  AM-PAC 6 Clicks Score (PT): 21    Tania Bearden PTA  1/5/2023

## 2023-01-05 NOTE — PLAN OF CARE
Goal Outcome Evaluation:      Levo paused at 0330, VSS. Pt on 2L NC. Current /62 with MAP of 78

## 2023-01-06 ENCOUNTER — APPOINTMENT (OUTPATIENT)
Dept: GENERAL RADIOLOGY | Facility: HOSPITAL | Age: 78
DRG: 236 | End: 2023-01-06
Payer: MEDICARE

## 2023-01-06 LAB
ANION GAP SERPL CALCULATED.3IONS-SCNC: 5 MMOL/L (ref 5–15)
ANION GAP SERPL CALCULATED.3IONS-SCNC: 9 MMOL/L (ref 5–15)
BUN SERPL-MCNC: 21 MG/DL (ref 8–23)
BUN SERPL-MCNC: 22 MG/DL (ref 8–23)
BUN/CREAT SERPL: 11.9 (ref 7–25)
BUN/CREAT SERPL: 12.2 (ref 7–25)
CALCIUM SPEC-SCNC: 9.4 MG/DL (ref 8.6–10.5)
CALCIUM SPEC-SCNC: 9.5 MG/DL (ref 8.6–10.5)
CHLORIDE SERPL-SCNC: 102 MMOL/L (ref 98–107)
CHLORIDE SERPL-SCNC: 105 MMOL/L (ref 98–107)
CO2 SERPL-SCNC: 24 MMOL/L (ref 22–29)
CO2 SERPL-SCNC: 25 MMOL/L (ref 22–29)
CREAT SERPL-MCNC: 1.77 MG/DL (ref 0.76–1.27)
CREAT SERPL-MCNC: 1.81 MG/DL (ref 0.76–1.27)
DEPRECATED RDW RBC AUTO: 51 FL (ref 37–54)
DEPRECATED RDW RBC AUTO: 52.6 FL (ref 37–54)
EGFRCR SERPLBLD CKD-EPI 2021: 38.5 ML/MIN/1.73
EGFRCR SERPLBLD CKD-EPI 2021: 39.6 ML/MIN/1.73
ERYTHROCYTE [DISTWIDTH] IN BLOOD BY AUTOMATED COUNT: 14.1 % (ref 12.3–15.4)
ERYTHROCYTE [DISTWIDTH] IN BLOOD BY AUTOMATED COUNT: 14.3 % (ref 12.3–15.4)
GLUCOSE SERPL-MCNC: 155 MG/DL (ref 65–99)
GLUCOSE SERPL-MCNC: 165 MG/DL (ref 65–99)
HCT VFR BLD AUTO: 30.3 % (ref 37.5–51)
HCT VFR BLD AUTO: 30.4 % (ref 37.5–51)
HGB BLD-MCNC: 9.3 G/DL (ref 13–17.7)
HGB BLD-MCNC: 9.6 G/DL (ref 13–17.7)
MCH RBC QN AUTO: 30.4 PG (ref 26.6–33)
MCH RBC QN AUTO: 31 PG (ref 26.6–33)
MCHC RBC AUTO-ENTMCNC: 30.7 G/DL (ref 31.5–35.7)
MCHC RBC AUTO-ENTMCNC: 31.6 G/DL (ref 31.5–35.7)
MCV RBC AUTO: 98.1 FL (ref 79–97)
MCV RBC AUTO: 99 FL (ref 79–97)
PLATELET # BLD AUTO: 192 10*3/MM3 (ref 140–450)
PLATELET # BLD AUTO: 200 10*3/MM3 (ref 140–450)
PMV BLD AUTO: 10.4 FL (ref 6–12)
PMV BLD AUTO: 11.5 FL (ref 6–12)
POTASSIUM SERPL-SCNC: 4.3 MMOL/L (ref 3.5–5.2)
POTASSIUM SERPL-SCNC: 4.5 MMOL/L (ref 3.5–5.2)
RBC # BLD AUTO: 3.06 10*6/MM3 (ref 4.14–5.8)
RBC # BLD AUTO: 3.1 10*6/MM3 (ref 4.14–5.8)
SODIUM SERPL-SCNC: 132 MMOL/L (ref 136–145)
SODIUM SERPL-SCNC: 138 MMOL/L (ref 136–145)
WBC NRBC COR # BLD: 10.67 10*3/MM3 (ref 3.4–10.8)
WBC NRBC COR # BLD: 10.71 10*3/MM3 (ref 3.4–10.8)

## 2023-01-06 PROCEDURE — 80048 BASIC METABOLIC PNL TOTAL CA: CPT | Performed by: NURSE PRACTITIONER

## 2023-01-06 PROCEDURE — 94799 UNLISTED PULMONARY SVC/PX: CPT

## 2023-01-06 PROCEDURE — 94761 N-INVAS EAR/PLS OXIMETRY MLT: CPT

## 2023-01-06 PROCEDURE — 85027 COMPLETE CBC AUTOMATED: CPT | Performed by: NURSE PRACTITIONER

## 2023-01-06 PROCEDURE — 97116 GAIT TRAINING THERAPY: CPT

## 2023-01-06 PROCEDURE — 71046 X-RAY EXAM CHEST 2 VIEWS: CPT

## 2023-01-06 PROCEDURE — 94664 DEMO&/EVAL PT USE INHALER: CPT

## 2023-01-06 PROCEDURE — 25010000002 ENOXAPARIN PER 10 MG: Performed by: NURSE PRACTITIONER

## 2023-01-06 RX ORDER — BISACODYL 10 MG
10 SUPPOSITORY, RECTAL RECTAL ONCE
Status: COMPLETED | OUTPATIENT
Start: 2023-01-06 | End: 2023-01-06

## 2023-01-06 RX ORDER — BISACODYL 10 MG
10 SUPPOSITORY, RECTAL RECTAL DAILY PRN
Status: DISCONTINUED | OUTPATIENT
Start: 2023-01-06 | End: 2023-01-08 | Stop reason: HOSPADM

## 2023-01-06 RX ORDER — METOPROLOL SUCCINATE 25 MG/1
25 TABLET, EXTENDED RELEASE ORAL
Status: DISCONTINUED | OUTPATIENT
Start: 2023-01-07 | End: 2023-01-08 | Stop reason: HOSPADM

## 2023-01-06 RX ADMIN — ACETAMINOPHEN 650 MG: 325 TABLET, FILM COATED ORAL at 20:34

## 2023-01-06 RX ADMIN — IPRATROPIUM BROMIDE AND ALBUTEROL SULFATE 3 ML: 2.5; .5 SOLUTION RESPIRATORY (INHALATION) at 06:05

## 2023-01-06 RX ADMIN — FEBUXOSTAT 40 MG: 40 TABLET, FILM COATED ORAL at 08:53

## 2023-01-06 RX ADMIN — METOPROLOL TARTRATE 12.5 MG: 25 TABLET, FILM COATED ORAL at 20:35

## 2023-01-06 RX ADMIN — ACETAMINOPHEN 650 MG: 325 TABLET, FILM COATED ORAL at 14:32

## 2023-01-06 RX ADMIN — METHOCARBAMOL 500 MG: 500 TABLET, FILM COATED ORAL at 05:25

## 2023-01-06 RX ADMIN — POLYETHYLENE GLYCOL 3350 17 G: 17 POWDER, FOR SOLUTION ORAL at 08:53

## 2023-01-06 RX ADMIN — CALCITRIOL 0.25 MCG: 0.25 CAPSULE ORAL at 08:53

## 2023-01-06 RX ADMIN — BISACODYL 10 MG: 5 TABLET ORAL at 20:34

## 2023-01-06 RX ADMIN — METOPROLOL TARTRATE 12.5 MG: 25 TABLET, FILM COATED ORAL at 08:53

## 2023-01-06 RX ADMIN — ASPIRIN 81 MG: 81 TABLET, COATED ORAL at 08:53

## 2023-01-06 RX ADMIN — BISACODYL 10 MG: 10 SUPPOSITORY RECTAL at 18:23

## 2023-01-06 RX ADMIN — ENOXAPARIN SODIUM 40 MG: 100 INJECTION SUBCUTANEOUS at 08:53

## 2023-01-06 RX ADMIN — ATORVASTATIN CALCIUM 20 MG: 10 TABLET, FILM COATED ORAL at 20:34

## 2023-01-06 RX ADMIN — BISACODYL 10 MG: 10 SUPPOSITORY RECTAL at 20:34

## 2023-01-06 RX ADMIN — METHOCARBAMOL 500 MG: 500 TABLET, FILM COATED ORAL at 14:32

## 2023-01-06 RX ADMIN — CLOPIDOGREL 75 MG: 75 TABLET, FILM COATED ORAL at 17:52

## 2023-01-06 RX ADMIN — OXYCODONE HYDROCHLORIDE 5 MG: 5 TABLET ORAL at 05:26

## 2023-01-06 RX ADMIN — BISACODYL 10 MG: 5 TABLET ORAL at 08:53

## 2023-01-06 RX ADMIN — OXYCODONE HYDROCHLORIDE 5 MG: 5 TABLET ORAL at 20:33

## 2023-01-06 RX ADMIN — OXYCODONE HYDROCHLORIDE 5 MG: 5 TABLET ORAL at 13:51

## 2023-01-06 RX ADMIN — PANTOPRAZOLE SODIUM 40 MG: 40 TABLET, DELAYED RELEASE ORAL at 05:26

## 2023-01-06 RX ADMIN — METHOCARBAMOL 500 MG: 500 TABLET, FILM COATED ORAL at 20:34

## 2023-01-06 RX ADMIN — ACETAMINOPHEN 650 MG: 325 TABLET, FILM COATED ORAL at 08:53

## 2023-01-06 RX ADMIN — ACETAMINOPHEN 650 MG: 325 TABLET, FILM COATED ORAL at 03:45

## 2023-01-06 NOTE — PROGRESS NOTES
"Patient name: Gallo Philip  Patient : 1945  VISIT # [id number]  MR #[id number]    Procedure:Procedure(s):  CORONARY ARTERY BYPASS GRAFTING X 1, LEFT INTERNAL MAMMARY ARTERY GRAFT, OFF-PUMP  Procedure Date:1/3/2023  POD:3 Days Post-Op    Subjective     Tolerating room air.  Weight is down 2 pounds today and he is 9 pounds above his baseline weight.  Creatinine today is 1.7.  He has not had a bowel movement but feels he may be able to go after he finishes breakfast.  Waslking 200 feet with PT.  Pain is well controlled.    Telemetry: Sinus rhythm 80-90       Objective     Visit Vitals  BP 92/51 (BP Location: Left arm, Patient Position: Lying) Comment: nurse notified   Pulse 78   Temp 98.3 °F (36.8 °C) (Oral)   Resp 18   Ht 177 cm (69.69\")   Wt 95.9 kg (211 lb 6.4 oz)   SpO2 94%   BMI 30.61 kg/m²   Baseline weight 202 pounds    Intake/Output Summary (Last 24 hours) at 2023 0748  Last data filed at 2023 0445  Gross per 24 hour   Intake 260 ml   Output 1260 ml   Net -1000 ml     MCT: 70 ml/24 hours, serosanguineous, no air leak  Left pleural: 110 ml/24 hours, serosanguineous, no air leak    Lab:        IMAGES:       Imaging Results (Last 24 Hours)     Procedure Component Value Units Date/Time    XR Chest PA & Lateral [[id number]] Collected: 23     Updated: 23    Narrative:      EXAMINATION: XR CHEST PA AND LATERAL- 2023 7:22 AM CST     HISTORY: CAD, s/p CABG     REPORT: Frontal and lateral views of the chest were obtained.     COMPARISON: Chest x-ray 2023 0305 hours.     The right IJ sheath has been removed. Mediastinal drains and left  pleural drain appear in satisfactory position, unchanged. Moderate  volume loss in the lung bases. There is mild pleural thickening at the  lateral aspect of both lung bases, stable. Median sternotomy wires are  present. There is mild cardiomegaly. No evidence of CHF is identified.  Advanced degenerative changes of both shoulders as " before.       Impression:      Removal of the right IJ sheath, no pneumothorax is  identified. The other life-support lines remain in satisfactory  position. Continued moderate hypoaeration of the lungs is noted. Mild  cardiomegaly without evidence of CHF.  This report was finalized on 01/06/2023 07:24 by Dr. Jimbo Lopez MD.        CXR: Chest tubes in stable position with no pneumothorax.  Continued hypoventilation with bibasilar atelectasis    Physical Exam:  General: Alert, oriented. No apparent distress. On room air  Cardiovascular: Regular rate and rhythm without murmur, rubs, or gallops.    Pulmonary: Clear to auscultation bilaterally without wheezing, rubs, or rales.  Chest: Sternotomy incision clean, dry, and intact. Sternum stable. No clicks.  Chest tubes to 20 cm suction. No air leak. Fluid is serosanguineous.   Abdomen: Soft, slightly distended, and nontender.  Extremities: Warm, moves all extremities.  Mild peripheral edema  Neurologic:  Grossly intact with no focal deficits.            Impression:  Coronary artery disease  Hypertension  Chronic kidney disease, stage 3B  Former smoker  Overweight, BMI 28        Plan:  Continue begin bowel regimen, suppository again today if no BM this morning.  Discontinue mediastinal chest tubes and left pleural drain  Hold on additional diuresis this morning.  Repeat CBC and CMP today at 1300.  Routine post cardiac surgery care  Encourage pulmonary toilet and ambulation  Anticipate discharge home in 1 to 2 days  Discussed with patient and nursing      CAROL Yoder  01/06/23  07:48 CST

## 2023-01-06 NOTE — PLAN OF CARE
Problem: Adult Inpatient Plan of Care  Goal: Plan of Care Review  Outcome: Ongoing, Progressing  Flowsheets  Taken 1/6/2023 0226 by Cathleen Akins RN  Outcome Evaluation: Patient has Awir T/I, Mediasteinal tube, and a pleural tube.  She has a total of 0out of each tube so far.  Cont. to monitor. CXR PA&LAT to be done this am.   Call for concerns,  Cont. to monitor.  Sinus per tele.  S89-90 First degree, BBB.  Taken 1/5/2023 0953 by Tania Bearden PTA  Progress: improving  Plan of Care Reviewed With: patient   Goal Outcome Evaluation:              Outcome Evaluation: Patient has Awir T/I, Mediasteinal tube, and a pleural tube.  She has a total of 0out of each tube so far.  Cont. to monitor. CXR PA&LAT to be done this am.   Call for concerns,  Cont. to monitor.  Sinus per tele.  S89-90 First degree, BBB.

## 2023-01-06 NOTE — PLAN OF CARE
Goal Outcome Evaluation:  Plan of Care Reviewed With: patient        Progress: improving  Outcome Evaluation: Pt. agreeable to therapy. Pt. just had chest tubes removed and pt. with very minimal pain. Pt. was SBA for transfers and gait. He walked 400' with one standing rest. Pt's O2 sat did drop from 92% to 88% with activity while on RA. With rest, his O2 sat increased to 91%. Will continue to work on progressive ambulation and independence.

## 2023-01-06 NOTE — THERAPY TREATMENT NOTE
Acute Care - Physical Therapy Treatment Note  [facility]     Patient Name: Gallo Philip  : 1945  MRN: [id number]  Today's Date: 2023      Visit Dx:     ICD-10-CM ICD-9-CM   1. Coronary artery disease involving native coronary artery of native heart without angina pectoris  I25.10 414.01   2. Impaired mobility  Z74.09 799.89     Patient Active Problem List   Diagnosis   • Hypertension   • Shortness of breath   • Family history of early CAD   • Transient global amnesia   • Thoracic outlet syndrome   • Coronary artery disease involving native coronary artery of native heart without angina pectoris   • PVC (premature ventricular contraction)   • Skipped heart beats   • Chronic combined systolic and diastolic heart failure (HCC)   • Hyperlipidemia LDL goal <70   • Stage 3b chronic kidney disease (HCC)   • Class 1 obesity due to excess calories with serious comorbidity and body mass index (BMI) of 30.0 to 30.9 in adult   • Overweight (BMI 25.0-29.9)   • Former smoker   • Coronary artery disease involving coronary bypass graft of native heart without angina pectoris     Past Medical History:   Diagnosis Date   • JAZ (acute kidney injury) ([facility])    • Bleeding ulcer    • Chronic combined systolic and diastolic heart failure (HCC) 2022   • Coronary artery disease involving native coronary artery of native heart without angina pectoris 2022   • Hyperlipidemia LDL goal <70 2022   • Hypertension    • Hypertensive nephrosclerosis    • Shortness of breath    • Stage 3b chronic kidney disease (HCC) 2022   • Stroke (formerly [facility])     2013 - ON PLAVIX     Past Surgical History:   Procedure Laterality Date   • APPENDECTOMY     • CARDIAC CATHETERIZATION Left 2022    Procedure: Cardiac Catheterization/Vascular Study;  Surgeon: Hero Cash MD;  [facility];  Service: Cardiology;  Laterality: Left;   • CORONARY ARTERY BYPASS GRAFT N/A 1/3/2023    Procedure: CORONARY ARTERY  BYPASS GRAFTING X 1, LEFT INTERNAL MAMMARY ARTERY GRAFT, OFF-PUMP;  Surgeon: Keith Griffin MD;  Location: [facility] OR;  Service: Cardiothoracic;  Laterality: N/A;   • KIDNEY STONE SURGERY     • REPLACEMENT TOTAL KNEE Left 2018     PT Assessment (last 12 hours)     PT Evaluation and Treatment     Row Name 01/06/23 1504 01/06/23 0937       Physical Therapy Time and Intention    Subjective Information no complaints  - complains of;pain  -    Document Type therapy note (daily note)  - therapy note (daily note)  -    Mode of Treatment physical therapy  - physical therapy  -    Row Name 01/06/23 1504 01/06/23 0937       General Information    Existing Precautions/Restrictions fall;sternal  - fall;sternal  -    Row Name 01/06/23 1504 01/06/23 0937       Pain    Pretreatment Pain Rating 0/10 - no pain  - 2/10  -    Posttreatment Pain Rating 0/10 - no pain  - 3/10  -    Pain Location -- incisional  -    Pain Location - -- chest  -    Pain Intervention(s) -- Repositioned;Ambulation/increased activity  -    Row Name 01/06/23 1504 01/06/23 0937       Bed Mobility    Comment, (Bed Mobility) up in chair  - up in chair  -    Row Name 01/06/23 1504 01/06/23 0937       Sit-Stand Transfer    Sit-Stand [place] (Transfers) supervision  - standby assist;supervision  -    Row Name 01/06/23 1504 01/06/23 0937       Stand-Sit Transfer    Stand-Sit [place] (Transfers) supervision  - standby assist;supervision  -    Row Name 01/06/23 1504 01/06/23 0937       Gait/Stairs (Locomotion)    [place] Level (Gait) supervision  - supervision  -    Distance in Feet (Gait) 400  - 400  1 standing rest  -    Row Name 01/06/23 0937          Motor Skills    Comments, Therapeutic Exercise ankle pumps  -     Row Name             Wound 01/03/23 1615 midline sternal Incision    Wound - Properties Group Placement Date: 01/03/23  -CO Placement Time: 1615 -CO Orientation: midline  -CO Location:  sternal  -CO Primary Wound Type: Incision  -CO    Retired Wound - Properties Group Placement Date: 01/03/23  -CO Placement Time: 1615 -CO Orientation: midline  -CO Location: sternal  -CO Primary Wound Type: Incision  -CO    Retired Wound - Properties Group Date first assessed: 01/03/23  -CO Time first assessed: 1615  -CO Location: sternal  -CO Primary Wound Type: Incision  -CO    Row Name 01/06/23 0937          Plan of Care Review    Plan of Care Reviewed With patient  -MF     Progress improving  -     Outcome Evaluation Pt. agreeable to therapy. Pt. just had chest tubes removed and pt. with very minimal pain. Pt. was SBA for transfers and gait. He walked 400' with one standing rest. Pt's O2 sat did drop from 92% to 88% with activity while on RA. With rest, his O2 sat increased to 91%. Will continue to work on progressive ambulation and independence.  -     Row Name 01/06/23 1504 01/06/23 0937       Vital Signs    Pre SpO2 (%) -- 92  -MF    O2 Delivery Pre Treatment -- room air  -MF    Intra SpO2 (%) -- 88  -MF    O2 Delivery Intra Treatment -- room air  -MF    Post SpO2 (%) 93  -MF 91  -MF    O2 Delivery Post Treatment room air  - room air  -    Pre Patient Position -- Sitting  -    Intra Patient Position -- Standing  -    Post Patient Position Sitting  - Sitting  -    Row Name 01/06/23 1504 01/06/23 0937       Positioning and Restraints    Pre-Treatment Position sitting in chair/recliner  - sitting in chair/recliner  -    Post Treatment Position chair  - chair  -MF    In Chair reclined;call light within reach;encouraged to call for assist  - reclined;call light within reach;encouraged to call for assist;with family/caregiver  -          User Key  (r) = Recorded By, (t) = Taken By, (c) = Cosigned By    Initials Name Provider Type    CO Trisha Doyle RN Registered Nurse    Makenzie Christopher PTA Physical Therapist Assistant                Physical Therapy Education     Title: PT OT  SLP Therapies (In Progress)     Topic: Physical Therapy (In Progress)     Point: Mobility training (Done)     Learning Progress Summary           Patient Acceptance, E, VU,DU,NR by MINA at 1/4/2023 2957    Comment: Benefits of activity, progression of PT, sternal prec                   Point: Home exercise program (Not Started)     Learner Progress:  Not documented in this visit.          Point: Body mechanics (Not Started)     Learner Progress:  Not documented in this visit.          Point: Precautions (Done)     Learning Progress Summary           Patient Acceptance, E, VU,DU,NR by MINA at 1/4/2023 0758    Comment: Benefits of activity, progression of PT, sternal prec                               User Key     Initials Effective Dates Name Provider Type Discipline    MINA 08/02/16 -  Parveen Corea, PT DPT Physical Therapist PT              PT Recommendation and Plan     Plan of Care Reviewed With: patient  Progress: improving  Outcome Evaluation: Pt. agreeable to therapy. Pt. just had chest tubes removed and pt. with very minimal pain. Pt. was SBA for transfers and gait. He walked 400' with one standing rest. Pt's O2 sat did drop from 92% to 88% with activity while on RA. With rest, his O2 sat increased to 91%. Will continue to work on progressive ambulation and independence.   Outcome Measures     Row Name 01/06/23 0937 01/05/23 0953          How much help from another person do you currently need...    Turning from your back to your side while in flat bed without using bedrails? 3  -MF 4  -TB     Moving from lying on back to sitting on the side of a flat bed without bedrails? 3  -MF 4  -TB     Moving to and from a bed to a chair (including a wheelchair)? 4  -MF 3  -TB     Standing up from a chair using your arms (e.g., wheelchair, bedside chair)? 4  -MF 4  -TB     Climbing 3-5 steps with a railing? 3  -MF 3  -TB     To walk in hospital room? 3  -MF 3  -TB     AM-PAC 6 Clicks Score (PT) 20  -MF 21  -TB         Functional Assessment    Outcome Measure Options AM-PAC 6 Clicks Basic Mobility (PT)  -MF AM-PAC 6 Clicks Basic Mobility (PT)  -TB           User Key  (r) = Recorded By, (t) = Taken By, (c) = Cosigned By    Initials Name Provider Type    Makenzie Christopher PTA Physical Therapist Assistant    TB Tania Bearden PTA Physical Therapist Assistant                 Time Calculation:    PT Charges     Row Name 01/06/23 1516 01/06/23 0955          Time Calculation    Start Time 1504  -MF 0937  -MF     Stop Time 1514  -MF 0948  -MF     Time Calculation (min) 10 min  -MF 11 min  -MF     PT Received On -- 01/06/23  -MF        Time Calculation- PT    Total Timed Code Minutes- PT 10 minute(s)  -MF 11 minute(s)  -MF        Timed Charges    09220 - Gait Training Minutes  10  -MF 11  -MF        Total Minutes    Timed Charges Total Minutes 10  -MF 11  -MF      Total Minutes 10  -MF 11  -MF           User Key  (r) = Recorded By, (t) = Taken By, (c) = Cosigned By    Initials Name Provider Type    Makenzie Christopher PTA Physical Therapist Assistant              Therapy Charges for Today     Code Description Service Date Service Provider Modifiers Qty    67699385798 HC GAIT TRAINING EA 15 MIN 1/6/2023 Makenzie Parker PTA GP 1    [id number] HC GAIT TRAINING EA 15 MIN 1/6/2023 Makenzie Parker PTA GP 1          PT G-Codes  Outcome Measure Options: AM-PAC 6 Clicks Basic Mobility (PT)  AM-PAC 6 Clicks Score (PT): 20    Makenzie Parker PTA  1/6/2023

## 2023-01-06 NOTE — DISCHARGE SUMMARY
[facility] Cardiothoracic Surgery  DISCHARGE SUMMARY        Date of Admission: 1/3/2023  Date of Discharge: 1/8/2022  Primary Care Physician: Carol Mccallum APRN    Discharge Diagnoses:  Active Hospital Problems    Diagnosis    • **Coronary artery disease involving native coronary artery of native heart without angina pectoris    • Chronic systolic heart failure (CMS/HCC)    • Overweight (BMI 25.0-29.9)    • Former smoker    • Coronary artery disease involving coronary bypass graft of native heart without angina pectoris    • Stage 3b chronic kidney disease (HCC)    • Hypertension        Procedures Performed:   Coronary Artery Bypass, using arterial graft; one arterial graft by Dr. Griffin on 1/3/2023    HPI:  Mr. Gallo Philip is a 77 y.o. male who presented to Dr. Griffin as an outpatient initially with unstable angina.  Work-up by Dr. Cash revealed occluded mid LAD with distal collateral filling.  Cardiac MRI was obtained which showed excellent viability of his anterior and apical wall.  He does have chronic kidney disease with baseline creatinine around 2.  LVEF is decreased around 36 to 40%.  Given this Dr. Griffin discussed with him the benefits of off-pump CABG. preoperative work-up was obtained and he was deemed a suitable surgery candidate.  The patient was agreeable to proceed.    Hospital Course: On 1/3/2023, Mr. Philip was taken to the operating room for coronary artery bypass grafting.  See separate op note by Dr. Griffin detailing the operation.  Following surgery he was transferred to the ICU in stable but guarded condition.  He remained hemodynamically stable and was extubated without remark during the evening hours following surgery.  He demonstrated an intact neurological exam and was tolerating nasal cannula.  Creatinine on postop day 1 was 1.45 and he was kept in the ICU for close monitoring and at to allow time to wean off vasopressors.  He was able to walk a lap around the ICU and  was transferred to  on postop day 2.  The remainder of his hospital stay was significant for encouraging pulmonary toilet and ambulation as well as pain control.  He is eating well and is demonstrated adequate bowel function.  He is tolerating room air and has met all physical therapy goals.  Creatinine at the time of discharge is 1.47 which is below his baseline.  Pacing wires were removed without remark and he meets criteria for discharge home on postop day 5.  Then patient is agreeable to this plan.    He will be discharged home with Lasix 20 mg daily x 5 days.  Routine follow-up with me in 1 week with labs prior to appointment.      Condition on Discharge:  Neurologically intact and has good pain control.  He is eating well and has demonstrable good bowel function.  The sternum is stable without clicks and the saphenectomy incisions are healing nicely.  The heart is in normal sinus rhythm.  He has met all physical therapy criteria and verbalizes understanding of sternotomy precautions.   He verbalizes understanding of a separately handed out cardiac surgery handout.       Discharge Disposition:  Home or Self Care [1]    Discharge Medications:     Discharge Medications      New Medications      Instructions Start Date   furosemide 20 MG tablet  Commonly known as: Lasix  Notes to patient: Next Dose: 1/9/2023 8am   20 mg, Oral, Daily      HYDROcodone-acetaminophen 5-325 MG per tablet  Commonly known as: Norco   1 tablet, Oral, Every 6 Hours PRN         Continue These Medications      Instructions Start Date   aspirin 81 MG tablet   81 mg, Oral, Daily      atorvastatin 80 MG tablet  Commonly known as: LIPITOR   80 mg, Oral, Nightly      calcitriol 0.25 MCG capsule  Commonly known as: ROCALTROL   0.25 mcg, Oral, Daily      clopidogrel 75 MG tablet  Commonly known as: PLAVIX   75 mg, Oral, Daily      colchicine 0.6 MG tablet   0.6 mg, Oral, Daily      Entresto 24-26 MG tablet  Generic drug: sacubitril-valsartan   1  tablet, Oral, 2 Times Daily      febuxostat 40 MG tablet  Commonly known as: ULORIC   40 mg, Oral, Daily      metoprolol succinate XL 25 MG 24 hr tablet  Commonly known as: TOPROL-XL   25 mg, Oral, Daily      pantoprazole 40 MG EC tablet  Commonly known as: PROTONIX   40 mg, Oral, Every Early Morning         Stop These Medications    isosorbide mononitrate 30 MG 24 hr tablet  Commonly known as: IMDUR     nitroglycerin 0.4 MG SL tablet  Commonly known as: NITROSTAT     spironolactone 25 MG tablet  Commonly known as: ALDACTONE            Discharge Diet: Regular diet     Discharge Care Plan / Instructions: Please see the separately handed out cardiac surgery handout.  He will not be referred to cardiac rehab at this time due to recent sternotomy.  We will reassess at his postop follow-up visit.    Activity at Discharge:   No heavy lifting greater than 5 pounds or a gallon of milk while maintaining sternal precautions.  Gallo VARGAS Cedrick has been instructed on an exercise  regiment as detailed in a handed out cardiac surgery handout.    Tobacco:  The patient does not use tobacco products and therefore does not need tobacco cessation education.    BMI: BMI is >= 30 and <35. (Class 1 Obesity). The following options were offered after discussion;: referral to primary care      Follow-up Appointments: Gallo Philip  is requested to see Carol Mccallum APRN within 1-2 weeks from time of discharge, to see Antonieta MEDINA in 1 week with labs prior to appointment, to follow-up with Dr. Griffin in 6 weeks,  and to follow-up with Dr. Cash of the cardiology service in 6 weeks.

## 2023-01-06 NOTE — THERAPY TREATMENT NOTE
Acute Care - Physical Therapy Treatment Note  [facility]     Patient Name: Gallo Philip  : 1945  MRN: [id number]  Today's Date: 2023      Visit Dx:     ICD-10-CM ICD-9-CM   1. Coronary artery disease involving native coronary artery of native heart without angina pectoris  I25.10 414.01   2. Impaired mobility  Z74.09 799.89     Patient Active Problem List   Diagnosis   • Hypertension   • Shortness of breath   • Family history of early CAD   • Transient global amnesia   • Thoracic outlet syndrome   • Coronary artery disease involving native coronary artery of native heart without angina pectoris   • PVC (premature ventricular contraction)   • Skipped heart beats   • Chronic combined systolic and diastolic heart failure (HCC)   • Hyperlipidemia LDL goal <70   • Stage 3b chronic kidney disease (HCC)   • Class 1 obesity due to excess calories with serious comorbidity and body mass index (BMI) of 30.0 to 30.9 in adult   • Overweight (BMI 25.0-29.9)   • Former smoker   • Coronary artery disease involving coronary bypass graft of native heart without angina pectoris     Past Medical History:   Diagnosis Date   • JAZ (acute kidney injury) ([facility])    • Bleeding ulcer    • Chronic combined systolic and diastolic heart failure (HCC) 2022   • Coronary artery disease involving native coronary artery of native heart without angina pectoris 2022   • Hyperlipidemia LDL goal <70 2022   • Hypertension    • Hypertensive nephrosclerosis    • Shortness of breath    • Stage 3b chronic kidney disease (HCC) 2022   • Stroke ([facility])     2013 - ON PLAVIX     Past Surgical History:   Procedure Laterality Date   • APPENDECTOMY     • CARDIAC CATHETERIZATION Left 2022    Procedure: Cardiac Catheterization/Vascular Study;  Surgeon: Hero Cash MD;  Location: [facility] INVASIVE LOCATION;  Service: Cardiology;  Laterality: Left;   • CORONARY ARTERY BYPASS GRAFT N/A 1/3/2023    Procedure: CORONARY ARTERY  BYPASS GRAFTING X 1, LEFT INTERNAL MAMMARY ARTERY GRAFT, OFF-PUMP;  Surgeon: Keith Griffin MD;  Location: [facility] OR;  Service: Cardiothoracic;  Laterality: N/A;   • KIDNEY STONE SURGERY     • REPLACEMENT TOTAL KNEE Left 2018     PT Assessment (last 12 hours)     PT Evaluation and Treatment     Row Name 01/06/23 0937          Physical Therapy Time and Intention    Subjective Information complains of;pain  -     Document Type therapy note (daily note)  -     Mode of Treatment physical therapy  -     Row Name 01/06/23 0937          General Information    Existing Precautions/Restrictions fall;sternal  -     Row Name 01/06/23 0937          Pain    Pretreatment Pain Rating 2/10  -     Posttreatment Pain Rating 3/10  -     Pain Location incisional  -     Pain Location - chest  -     Pain Intervention(s) Repositioned;Ambulation/increased activity  -[facility] Name 01/06/23 0937          Bed Mobility    Comment, (Bed Mobility) up in chair  -     Row Name 01/06/23 0937          Sit-Stand Transfer    Sit-Stand [place] (Transfers) standby assist;supervision  -     Row Name 01/06/23 0937          Stand-Sit Transfer    Stand-Sit [place] (Transfers) standby assist;supervision  -[facility] Name 01/06/23 0937          Gait/Stairs (Locomotion)    [place] Level (Gait) supervision  -     Distance in Feet (Gait) 400  1 standing rest  -     Row Name 01/06/23 0937          Motor Skills    Comments, Therapeutic Exercise ankle pumps  -     Row Name             Wound 01/03/23 1615 midline sternal Incision    Wound - Properties Group Placement Date: 01/03/23  -CO Placement Time: 1615  -CO Orientation: midline  -CO Location: sternal  -CO Primary Wound Type: Incision  -CO    Retired Wound - Properties Group Placement Date: 01/03/23  -CO Placement Time: 1615 -CO Orientation: midline  -CO Location: sternal  -CO Primary Wound Type: Incision  -CO    Retired Wound - Properties Group Date first assessed:  01/03/23  -CO Time first assessed: 1615  -CO Location: sternal  -CO Primary Wound Type: Incision  -CO    Row Name 01/06/23 0937          Plan of Care Review    Plan of Care Reviewed With patient  -MF     Progress improving  -     Outcome Evaluation Pt. agreeable to therapy. Pt. just had chest tubes removed and pt. with very minimal pain. Pt. was SBA for transfers and gait. He walked 400' with one standing rest. Pt's O2 sat did drop from 92% to 88% with activity while on RA. With rest, his O2 sat increased to 91%. Will continue to work on progressive ambulation and independence.  -     Row Name 01/06/23 0937          Vital Signs    Pre SpO2 (%) 92  -MF     O2 Delivery Pre Treatment room air  -MF     Intra SpO2 (%) 88  -MF     O2 Delivery Intra Treatment room air  -MF     Post SpO2 (%) 91  -MF     O2 Delivery Post Treatment room air  -MF     Pre Patient Position Sitting  -     Intra Patient Position Standing  -MF     Post Patient Position Sitting  -     Row Name 01/06/23 0937          Positioning and Restraints    Pre-Treatment Position sitting in chair/recliner  -     Post Treatment Position chair  -MF     In Chair reclined;call light within reach;encouraged to call for assist;with family/caregiver  -           User Key  (r) = Recorded By, (t) = Taken By, (c) = Cosigned By    Initials Name Provider Type    CO Trisha Doyle, RN Registered Nurse    Makenzie Christopher, PTA Physical Therapist Assistant                Physical Therapy Education     Title: PT OT SLP Therapies (In Progress)     Topic: Physical Therapy (In Progress)     Point: Mobility training (Done)     Learning Progress Summary           Patient Acceptance, E, SKYE,YOANA,NR by MINA at 1/4/2023 1583    Comment: Benefits of activity, progression of PT, sternal prec                   Point: Home exercise program (Not Started)     Learner Progress:  Not documented in this visit.          Point: Body mechanics (Not Started)     Learner Progress:   Not documented in this visit.          Point: Precautions (Done)     Learning Progress Summary           Patient Acceptance, E, SKYE,DU,NR by MINA at 1/4/2023 7286    Comment: Benefits of activity, progression of PT, sternal prec                               User Key     Initials Effective Dates Name Provider Type Discipline    MINA 08/02/16 -  Parveen Corea, PT DPT Physical Therapist PT              PT Recommendation and Plan     Plan of Care Reviewed With: patient  Progress: improving  Outcome Evaluation: Pt. agreeable to therapy. Pt. just had chest tubes removed and pt. with very minimal pain. Pt. was SBA for transfers and gait. He walked 400' with one standing rest. Pt's O2 sat did drop from 92% to 88% with activity while on RA. With rest, his O2 sat increased to 91%. Will continue to work on progressive ambulation and independence.   Outcome Measures     Row Name 01/06/23 0937 01/05/23 0953          How much help from another person do you currently need...    Turning from your back to your side while in flat bed without using bedrails? 3  -MF 4  -TB     Moving from lying on back to sitting on the side of a flat bed without bedrails? 3  -MF 4  -TB     Moving to and from a bed to a chair (including a wheelchair)? 4  -MF 3  -TB     Standing up from a chair using your arms (e.g., wheelchair, bedside chair)? 4  -MF 4  -TB     Climbing 3-5 steps with a railing? 3  -MF 3  -TB     To walk in hospital room? 3  -MF 3  -TB     AM-PAC 6 Clicks Score (PT) 20  -MF 21  -TB        Functional Assessment    Outcome Measure Options AM-PAC 6 Clicks Basic Mobility (PT)  - AM-PAC 6 Clicks Basic Mobility (PT)  -TB           User Key  (r) = Recorded By, (t) = Taken By, (c) = Cosigned By    Initials Name Provider Type    Makenzie Christopher, PTA Physical Therapist Assistant    Tania Salcido PTA Physical Therapist Assistant                 Time Calculation:    PT Charges     Row Name 01/06/23 0910             Time  Calculation    Start Time 0937  -MF      Stop Time 0948  -MF      Time Calculation (min) 11 min  -MF      PT Received On 01/06/23  -MF         Time Calculation- PT    Total Timed Code Minutes- PT 11 minute(s)  -MF         Timed Charges    06927 - Gait Training Minutes  11  -MF         Total Minutes    Timed Charges Total Minutes 11  -MF       Total Minutes 11  -MF            User Key  (r) = Recorded By, (t) = Taken By, (c) = Cosigned By    Initials Name Provider Type    Makenzie Christopher PTA Physical Therapist Assistant              Therapy Charges for Today     Code Description Service Date Service Provider Modifiers Qty    [id number]  GAIT TRAINING EA 15 MIN 1/6/2023 Makenzie Parker PTA GP 1          PT G-Codes  Outcome Measure Options: AM-PAC 6 Clicks Basic Mobility (PT)  AM-PAC 6 Clicks Score (PT): 20    Makenzie Parker PTA  1/6/2023

## 2023-01-07 ENCOUNTER — APPOINTMENT (OUTPATIENT)
Dept: GENERAL RADIOLOGY | Facility: HOSPITAL | Age: 78
DRG: 236 | End: 2023-01-07
Payer: MEDICARE

## 2023-01-07 LAB
ANION GAP SERPL CALCULATED.3IONS-SCNC: 7 MMOL/L (ref 5–15)
BH BB BLOOD EXPIRATION DATE: NORMAL
BH BB BLOOD EXPIRATION DATE: NORMAL
BH BB BLOOD TYPE BARCODE: 5100
BH BB BLOOD TYPE BARCODE: 5100
BH BB DISPENSE STATUS: NORMAL
BH BB DISPENSE STATUS: NORMAL
BH BB PRODUCT CODE: NORMAL
BH BB PRODUCT CODE: NORMAL
BH BB UNIT NUMBER: NORMAL
BH BB UNIT NUMBER: NORMAL
BUN SERPL-MCNC: 25 MG/DL (ref 8–23)
BUN/CREAT SERPL: 16.1 (ref 7–25)
CALCIUM SPEC-SCNC: 8.9 MG/DL (ref 8.6–10.5)
CHLORIDE SERPL-SCNC: 108 MMOL/L (ref 98–107)
CO2 SERPL-SCNC: 24 MMOL/L (ref 22–29)
CREAT SERPL-MCNC: 1.55 MG/DL (ref 0.76–1.27)
CROSSMATCH INTERPRETATION: NORMAL
CROSSMATCH INTERPRETATION: NORMAL
DEPRECATED RDW RBC AUTO: 51.9 FL (ref 37–54)
EGFRCR SERPLBLD CKD-EPI 2021: 46.4 ML/MIN/1.73
ERYTHROCYTE [DISTWIDTH] IN BLOOD BY AUTOMATED COUNT: 14.2 % (ref 12.3–15.4)
GLUCOSE SERPL-MCNC: 111 MG/DL (ref 65–99)
HCT VFR BLD AUTO: 28.1 % (ref 37.5–51)
HGB BLD-MCNC: 8.9 G/DL (ref 13–17.7)
MCH RBC QN AUTO: 31.2 PG (ref 26.6–33)
MCHC RBC AUTO-ENTMCNC: 31.7 G/DL (ref 31.5–35.7)
MCV RBC AUTO: 98.6 FL (ref 79–97)
PLATELET # BLD AUTO: 205 10*3/MM3 (ref 140–450)
PMV BLD AUTO: 11.3 FL (ref 6–12)
POTASSIUM SERPL-SCNC: 3.8 MMOL/L (ref 3.5–5.2)
RBC # BLD AUTO: 2.85 10*6/MM3 (ref 4.14–5.8)
SODIUM SERPL-SCNC: 139 MMOL/L (ref 136–145)
UNIT  ABO: NORMAL
UNIT  ABO: NORMAL
UNIT  RH: NORMAL
UNIT  RH: NORMAL
WBC NRBC COR # BLD: 8.98 10*3/MM3 (ref 3.4–10.8)

## 2023-01-07 PROCEDURE — 71045 X-RAY EXAM CHEST 1 VIEW: CPT

## 2023-01-07 PROCEDURE — 97116 GAIT TRAINING THERAPY: CPT

## 2023-01-07 PROCEDURE — 25010000002 ENOXAPARIN PER 10 MG: Performed by: NURSE PRACTITIONER

## 2023-01-07 PROCEDURE — 80048 BASIC METABOLIC PNL TOTAL CA: CPT | Performed by: NURSE PRACTITIONER

## 2023-01-07 PROCEDURE — 25010000002 FUROSEMIDE PER 20 MG: Performed by: NURSE PRACTITIONER

## 2023-01-07 PROCEDURE — 85027 COMPLETE CBC AUTOMATED: CPT | Performed by: NURSE PRACTITIONER

## 2023-01-07 RX ORDER — FUROSEMIDE 10 MG/ML
20 INJECTION INTRAMUSCULAR; INTRAVENOUS ONCE
Status: COMPLETED | OUTPATIENT
Start: 2023-01-07 | End: 2023-01-07

## 2023-01-07 RX ADMIN — FUROSEMIDE 20 MG: 10 INJECTION, SOLUTION INTRAMUSCULAR; INTRAVENOUS at 09:23

## 2023-01-07 RX ADMIN — OXYCODONE HYDROCHLORIDE 5 MG: 5 TABLET ORAL at 20:53

## 2023-01-07 RX ADMIN — BISACODYL 10 MG: 10 SUPPOSITORY RECTAL at 11:57

## 2023-01-07 RX ADMIN — FEBUXOSTAT 40 MG: 40 TABLET, FILM COATED ORAL at 09:18

## 2023-01-07 RX ADMIN — ACETAMINOPHEN 650 MG: 325 TABLET, FILM COATED ORAL at 20:53

## 2023-01-07 RX ADMIN — ACETAMINOPHEN 650 MG: 325 TABLET, FILM COATED ORAL at 04:46

## 2023-01-07 RX ADMIN — SACUBITRIL AND VALSARTAN 1 TABLET: 24; 26 TABLET, FILM COATED ORAL at 09:17

## 2023-01-07 RX ADMIN — METHOCARBAMOL 500 MG: 500 TABLET, FILM COATED ORAL at 14:40

## 2023-01-07 RX ADMIN — ACETAMINOPHEN 650 MG: 325 TABLET, FILM COATED ORAL at 09:18

## 2023-01-07 RX ADMIN — METOPROLOL SUCCINATE 25 MG: 25 TABLET, EXTENDED RELEASE ORAL at 09:17

## 2023-01-07 RX ADMIN — ACETAMINOPHEN 650 MG: 325 TABLET, FILM COATED ORAL at 14:39

## 2023-01-07 RX ADMIN — CLOPIDOGREL 75 MG: 75 TABLET, FILM COATED ORAL at 17:33

## 2023-01-07 RX ADMIN — ATORVASTATIN CALCIUM 20 MG: 10 TABLET, FILM COATED ORAL at 20:53

## 2023-01-07 RX ADMIN — BISACODYL 10 MG: 5 TABLET ORAL at 09:17

## 2023-01-07 RX ADMIN — METHOCARBAMOL 500 MG: 500 TABLET, FILM COATED ORAL at 20:52

## 2023-01-07 RX ADMIN — BISACODYL 10 MG: 5 TABLET ORAL at 20:54

## 2023-01-07 RX ADMIN — POLYETHYLENE GLYCOL 3350 17 G: 17 POWDER, FOR SOLUTION ORAL at 09:18

## 2023-01-07 RX ADMIN — PANTOPRAZOLE SODIUM 40 MG: 40 TABLET, DELAYED RELEASE ORAL at 04:46

## 2023-01-07 RX ADMIN — ENOXAPARIN SODIUM 40 MG: 100 INJECTION SUBCUTANEOUS at 09:17

## 2023-01-07 RX ADMIN — SACUBITRIL AND VALSARTAN 1 TABLET: 24; 26 TABLET, FILM COATED ORAL at 20:53

## 2023-01-07 RX ADMIN — CALCITRIOL 0.25 MCG: 0.25 CAPSULE ORAL at 11:00

## 2023-01-07 RX ADMIN — OXYCODONE HYDROCHLORIDE 5 MG: 5 TABLET ORAL at 04:46

## 2023-01-07 RX ADMIN — ASPIRIN 81 MG: 81 TABLET, COATED ORAL at 09:18

## 2023-01-07 RX ADMIN — OXYCODONE HYDROCHLORIDE 5 MG: 5 TABLET ORAL at 14:39

## 2023-01-07 RX ADMIN — METHOCARBAMOL 500 MG: 500 TABLET, FILM COATED ORAL at 04:48

## 2023-01-07 NOTE — PLAN OF CARE
Goal Outcome Evaluation:  Plan of Care Reviewed With: patient        Progress: improving  Outcome Evaluation: Pt. doing well with therapy. Pt. was Independent with transfers. He walked 400' with Supervision/Independent . He went up/down 4 steps without difficulty. His O2 sat was 92% on RA following walk. Encouraged pt. to get up and walk as he pleases. Pt. will be safe to return home with spouse.

## 2023-01-07 NOTE — PROGRESS NOTES
"Patient name: Gallo Philip  Patient : 1945  VISIT # [id number]  MR #[id number]    Procedure:Procedure(s):  CORONARY ARTERY BYPASS GRAFTING X 1, LEFT INTERNAL MAMMARY ARTERY GRAFT, OFF-PUMP  Procedure Date:1/3/2023  POD:4 Days Post-Op    Subjective     Up in chair.  Remains on room air.  Weight is down 4 pounds and remains 5 pounds over baseline.  Reports having 3 small bowel movements and passing very little gas.  Abdominal distention.  Creatinine improved to 1.55.  Ambulating 400 feet with PT reports pain is well controlled.      Telemetry: Sinus rhythm 70s-80s       Objective     Visit Vitals  /79 (BP Location: Left arm, Patient Position: Sitting)   Pulse 71   Temp 97.9 °F (36.6 °C) (Oral)   Resp 18   Ht 177 cm (69.69\")   Wt 93.9 kg (207 lb)   SpO2 95%   BMI 29.97 kg/m²   Baseline weight 202 pounds    Intake/Output Summary (Last 24 hours) at 2023 1037  Last data filed at 2023 0552  Gross per 24 hour   Intake 720 ml   Output 450 ml   Net 270 ml       Lab:        IMAGES:       Imaging Results (Last 24 Hours)     Procedure Component Value Units Date/Time    XR Chest 1 View [[id number]] Collected: 23 0458     Updated: 23 0503    Narrative:      EXAM/TECHNIQUE: XR CHEST 1 VW-     INDICATION: Post-Op Heart Surgery     COMPARISON: Prior to     FINDINGS:     Mediastinal drain and LEFT chest tube have been removed. No visible  pneumothorax. Cardiac silhouette is enlarged but stable. There is  increased hazy opacity in the LEFT lower lobe. No large pleural  effusion. Sternotomy wires are stable in alignment. No acute osseous  finding.       Impression:         1.  Mediastinal drain and left-sided chest tube have been removed.  2.  Increased hazy LEFT lower lobe opacity, likely related to  atelectasis.  3.  Enlarged but stable cardiac silhouette.  This report was finalized on 2023 04:59 by Dr. Sammy Owens MD.        CXR: Left lower lobe atelectasis.  No pleural effusion or " pneumothorax    Physical Exam:  General: Alert, oriented. No apparent distress. On room air  Cardiovascular: Regular rate and rhythm without murmur, rubs, or gallops.    Pulmonary: Clear to auscultation bilaterally without wheezing, rubs, or rales.  Chest: Sternotomy incision clean, dry, and intact. Sternum stable. No clicks.    Abdomen: Soft, slightly distended, and nontender.  Extremities: Warm, moves all extremities.  Mild peripheral edema  Neurologic:  Grossly intact with no focal deficits.            Impression:  Coronary artery disease  Hypertension  Chronic kidney disease, stage 3B  Former smoker  Overweight, BMI 28        Plan:  Continue begin bowel regimen, suppository again today   Give Lasix 20 mg IV x1 today  Routine post cardiac surgery care  Encourage pulmonary toilet and ambulation    Anticipate possibly discharge tomorrow if has a good bowel movement bowel movement    Discussed with patient and nursing      CAROL French  01/07/23  10:37 CST

## 2023-01-07 NOTE — THERAPY TREATMENT NOTE
Acute Care - Physical Therapy Treatment Note  [facility]     Patient Name: Gallo Philip  : 1945  MRN: [id number]  Today's Date: 2023      Visit Dx:     ICD-10-CM ICD-9-CM   1. Coronary artery disease involving native coronary artery of native heart without angina pectoris  I25.10 414.01   2. Impaired mobility  Z74.09 799.89     Patient Active Problem List   Diagnosis   • Hypertension   • Shortness of breath   • Family history of early CAD   • Transient global amnesia   • Thoracic outlet syndrome   • Coronary artery disease involving native coronary artery of native heart without angina pectoris   • PVC (premature ventricular contraction)   • Skipped heart beats   • Chronic combined systolic and diastolic heart failure (HCC)   • Hyperlipidemia LDL goal <70   • Stage 3b chronic kidney disease (HCC)   • Class 1 obesity due to excess calories with serious comorbidity and body mass index (BMI) of 30.0 to 30.9 in adult   • Overweight (BMI 25.0-29.9)   • Former smoker   • Coronary artery disease involving coronary bypass graft of native heart without angina pectoris     Past Medical History:   Diagnosis Date   • JAZ (acute kidney injury) ([facility])    • Bleeding ulcer    • Chronic combined systolic and diastolic heart failure (HCC) 2022   • Coronary artery disease involving native coronary artery of native heart without angina pectoris 2022   • Hyperlipidemia LDL goal <70 2022   • Hypertension    • Hypertensive nephrosclerosis    • Shortness of breath    • Stage 3b chronic kidney disease (HCC) 2022   • Stroke ([facility])     2013 - ON PLAVIX     Past Surgical History:   Procedure Laterality Date   • APPENDECTOMY     • CARDIAC CATHETERIZATION Left 2022    Procedure: Cardiac Catheterization/Vascular Study;  Surgeon: Hero Cash MD;  Location: [facility] INVASIVE LOCATION;  Service: Cardiology;  Laterality: Left;   • CORONARY ARTERY BYPASS GRAFT N/A 1/3/2023    Procedure: CORONARY ARTERY  BYPASS GRAFTING X 1, LEFT INTERNAL MAMMARY ARTERY GRAFT, OFF-PUMP;  Surgeon: Keith Griffin MD;  Location: [facility] OR;  Service: Cardiothoracic;  Laterality: N/A;   • KIDNEY STONE SURGERY     • REPLACEMENT TOTAL KNEE Left 2018     PT Assessment (last 12 hours)     PT Evaluation and Treatment     Row Name 01/07/23 1043          Physical Therapy Time and Intention    Subjective Information no complaints  -     Document Type therapy note (daily note)  -     Mode of Treatment physical therapy  -     Row Name 01/07/23 1043          General Information    Existing Precautions/Restrictions fall;sternal  -     Row Name 01/07/23 1043          Pain    Pretreatment Pain Rating 0/10 - no pain  -     Posttreatment Pain Rating 0/10 - no pain  -     Row Name 01/07/23 1043          Bed Mobility    Comment, (Bed Mobility) up in chair  -     Row Name 01/07/23 1043          Sit-Stand Transfer    Sit-Stand [place] (Transfers) independent  -     Row Name 01/07/23 1043          Stand-Sit Transfer    Stand-Sit [place] (Transfers) independent  -     Row Name 01/07/23 1043          Gait/Stairs (Locomotion)    [place] Level (Gait) supervision;independent  -     Distance in Feet (Gait) 400  1 standing rest  -     [place] Level (Stairs) contact guard  -     Handrail Location (Stairs) right side (descending);left side (ascending)  -     Number of Steps (Stairs) 4  -     Ascending Technique (Stairs) step-to-step  -     Descending Technique (Stairs) step-to-step  -     Row Name             Wound 01/03/23 1615 midline sternal Incision    Wound - Properties Group Placement Date: 01/03/23  -CO Placement Time: 1615 -CO Orientation: midline  -CO Location: sternal  -CO Primary Wound Type: Incision  -CO    Retired Wound - Properties Group Placement Date: 01/03/23  -CO Placement Time: 1615 -CO Orientation: midline  -CO Location: sternal  -CO Primary Wound Type: Incision  -CO    Retired Wound -  Properties Group Date first assessed: 01/03/23  -CO Time first assessed: 1615  -CO Location: sternal  -CO Primary Wound Type: Incision  -CO    Row Name 01/07/23 1043          Plan of Care Review    Plan of Care Reviewed With patient  -MF     Progress improving  -     Outcome Evaluation Pt. doing well with therapy. Pt. was Independent with transfers. He walked 400' with Supervision/Independent . He went up/down 4 steps without difficulty. His O2 sat was 92% on RA following walk. Encouraged pt. to get up and walk as he pleases. Pt. will be safe to return home with spouse.  -     Row Name 01/07/23 1043          Vital Signs    Post SpO2 (%) 92  -MF     O2 Delivery Post Treatment room air  -     Post Patient Position Sitting  -     Row Name 01/07/23 1043          Positioning and Restraints    Pre-Treatment Position sitting in chair/recliner  -     Post Treatment Position chair  -MF     In Chair reclined;call light within reach;encouraged to call for assist;with family/caregiver  -           User Key  (r) = Recorded By, (t) = Taken By, (c) = Cosigned By    Initials Name Provider Type    CO Trisha Doyle, RN Registered Nurse    Makenzie Christopher, PTA Physical Therapist Assistant                Physical Therapy Education     Title: PT OT SLP Therapies (In Progress)     Topic: Physical Therapy (In Progress)     Point: Mobility training (Done)     Learning Progress Summary           Patient Acceptance, E, VU,DU,NR by MINA at 1/4/2023 0757    Comment: Benefits of activity, progression of PT, sternal prec                   Point: Home exercise program (Not Started)     Learner Progress:  Not documented in this visit.          Point: Body mechanics (Not Started)     Learner Progress:  Not documented in this visit.          Point: Precautions (Done)     Learning Progress Summary           Patient Acceptance, E, VU,DU,NR by MINA at 1/4/2023 0757    Comment: Benefits of activity, progression of PT, sternal prec                                User Key     Initials Effective Dates Name Provider Type Discipline    MINA 08/02/16 -  Parveen Corea, PT DPT Physical Therapist PT              PT Recommendation and Plan     Plan of Care Reviewed With: patient  Progress: improving  Outcome Evaluation: Pt. doing well with therapy. Pt. was Independent with transfers. He walked 400' with Supervision/Independent . He went up/down 4 steps without difficulty. His O2 sat was 92% on RA following walk. Encouraged pt. to get up and walk as he pleases. Pt. will be safe to return home with spouse.   Outcome Measures     Row Name 01/07/23 1043 01/06/23 0937 01/05/23 0953       How much help from another person do you currently need...    Turning from your back to your side while in flat bed without using bedrails? 3  -MF 3  -MF 4  -TB    Moving from lying on back to sitting on the side of a flat bed without bedrails? 3  -MF 3  -MF 4  -TB    Moving to and from a bed to a chair (including a wheelchair)? 4  -MF 4  -MF 3  -TB    Standing up from a chair using your arms (e.g., wheelchair, bedside chair)? 4  -MF 4  -MF 4  -TB    Climbing 3-5 steps with a railing? 3  -MF 3  -MF 3  -TB    To walk in hospital room? 4  -MF 3  -MF 3  -TB    AM-PAC 6 Clicks Score (PT) 21  -MF 20  -MF 21  -TB       Functional Assessment    Outcome Measure Options AM-PAC 6 Clicks Basic Mobility (PT)  -MF AM-PAC 6 Clicks Basic Mobility (PT)  -MF AM-PAC 6 Clicks Basic Mobility (PT)  -TB          User Key  (r) = Recorded By, (t) = Taken By, (c) = Cosigned By    Initials Name Provider Type    Makenzie Christopher, PTA Physical Therapist Assistant    TB Tania Bearden, PTA Physical Therapist Assistant                 Time Calculation:    PT Charges     Row Name 01/07/23 1058             Time Calculation    Start Time 1043  -      Stop Time 1055  -      Time Calculation (min) 12 min  -      PT Received On 01/07/23  -         Time Calculation- PT    Total Timed Code  Minutes- PT 12 minute(s)  -MF         Timed Charges    96934 - Gait Training Minutes  12  -MF         Total Minutes    Timed Charges Total Minutes 12  -MF       Total Minutes 12  -MF            User Key  (r) = Recorded By, (t) = Taken By, (c) = Cosigned By    Initials Name Provider Type    Makenzie Christopher PTA Physical Therapist Assistant              Therapy Charges for Today     Code Description Service Date Service Provider Modifiers Qty    61289359409 HC GAIT TRAINING EA 15 MIN 1/6/2023 Makenzie Parker, ZAC GP 1    41788384109 HC GAIT TRAINING EA 15 MIN 1/6/2023 Makenzie Parker, ZAC GP 1    25663655988 HC GAIT TRAINING EA 15 MIN 1/7/2023 Makenzie Parker, ZAC GP 1          PT G-Codes  Outcome Measure Options: AM-PAC 6 Clicks Basic Mobility (PT)  AM-PAC 6 Clicks Score (PT): 21    Makenzie Parker PTA  1/7/2023

## 2023-01-07 NOTE — PLAN OF CARE
Problem: Adult Inpatient Plan of Care  Goal: Plan of Care Review  Outcome: Ongoing, Progressing  Flowsheets  Taken 1/7/2023 0119 by Cathleen Akins RN  Plan of Care Reviewed With: patient  Outcome Evaluation: Patient pain is better now that tubes are all out.  Patient received pain med at bed time for incisional pain and he stated that it would also help him withy sleep.  Patient to have a 1view cxr this am.  Cont. to monitor.  Call for concerns.  Taken 1/6/2023 0937 by Makenzie Parker PTA  Progress: improving   Goal Outcome Evaluation:  Plan of Care Reviewed With: patient           Outcome Evaluation: Patient pain is better now that tubes are all out.  Patient received pain med at bed time for incisional pain and he stated that it would also help him withy sleep.  Patient to have a 1view cxr this am.  Cont. to monitor.  Call for concerns.

## 2023-01-07 NOTE — THERAPY TREATMENT NOTE
Acute Care - Physical Therapy Treatment Note  [facility]     Patient Name: Gallo Philip  : 1945  MRN: [id number]  Today's Date: 2023      Visit Dx:     ICD-10-CM ICD-9-CM   1. Coronary artery disease involving native coronary artery of native heart without angina pectoris  I25.10 414.01   2. Impaired mobility  Z74.09 799.89     Patient Active Problem List   Diagnosis   • Hypertension   • Shortness of breath   • Family history of early CAD   • Transient global amnesia   • Thoracic outlet syndrome   • Coronary artery disease involving native coronary artery of native heart without angina pectoris   • PVC (premature ventricular contraction)   • Skipped heart beats   • Chronic combined systolic and diastolic heart failure (HCC)   • Hyperlipidemia LDL goal <70   • Stage 3b chronic kidney disease (HCC)   • Class 1 obesity due to excess calories with serious comorbidity and body mass index (BMI) of 30.0 to 30.9 in adult   • Overweight (BMI 25.0-29.9)   • Former smoker   • Coronary artery disease involving coronary bypass graft of native heart without angina pectoris     Past Medical History:   Diagnosis Date   • JAZ (acute kidney injury) ([facility])    • Bleeding ulcer    • Chronic combined systolic and diastolic heart failure (HCC) 2022   • Coronary artery disease involving native coronary artery of native heart without angina pectoris 2022   • Hyperlipidemia LDL goal <70 2022   • Hypertension    • Hypertensive nephrosclerosis    • Shortness of breath    • Stage 3b chronic kidney disease (HCC) 2022   • Stroke ([facility])     2013 - ON PLAVIX     Past Surgical History:   Procedure Laterality Date   • APPENDECTOMY     • CARDIAC CATHETERIZATION Left 2022    Procedure: Cardiac Catheterization/Vascular Study;  Surgeon: Hero Cash MD;  Location: [facility] INVASIVE LOCATION;  Service: Cardiology;  Laterality: Left;   • CORONARY ARTERY BYPASS GRAFT N/A 1/3/2023    Procedure: CORONARY ARTERY  BYPASS GRAFTING X 1, LEFT INTERNAL MAMMARY ARTERY GRAFT, OFF-PUMP;  Surgeon: Keith Griffin MD;  Location: [facility];  Service: Cardiothoracic;  Laterality: N/A;   • KIDNEY STONE SURGERY     • REPLACEMENT TOTAL KNEE Left 2018     PT Assessment (last 12 hours)     PT Evaluation and Treatment     Row Name 01/07/23 1515 01/07/23 1435       Physical Therapy Time and Intention    Subjective Information complains of;pain  - --  -    Document Type therapy note (daily note)  - --    Mode of Treatment physical therapy  - --    Session Not Performed -- other (see comments)  -    Comment, Session Not Performed -- pt requested to check back after pain med  -    Row Name 01/07/23 1043          Physical Therapy Time and Intention    Subjective Information no complaints  -     Document Type therapy note (daily note)  -     Mode of Treatment physical therapy  -     Row Name 01/07/23 1515 01/07/23 1043       General Information    Existing Precautions/Restrictions fall;sternal  - fall;sternal  -    Row Name 01/07/23 1515 01/07/23 1043       Pain    Pretreatment Pain Rating 4/10  - 0/10 - no pain  -    Posttreatment Pain Rating 4/10  - 0/10 - no pain  -    Pain Location incisional  - --    Pain Location - chest  - --    Pain Intervention(s) Medication (See MAR);Repositioned;Ambulation/increased activity  - --    Row Name 01/07/23 1515 01/07/23 1043       Bed Mobility    Comment, (Bed Mobility) chair  - up in chair  -    Row Name 01/07/23 1515 01/07/23 1043       Sit-Stand Transfer    Sit-Stand [place] (Transfers) independent  - independent  -    Row Name 01/07/23 1515 01/07/23 1043       Stand-Sit Transfer    Stand-Sit [place] (Transfers) independent  - independent  -    Row Name 01/07/23 1515 01/07/23 1043       Gait/Stairs (Locomotion)    [place] Level (Gait) supervision;independent  - supervision;independent  -    Distance in Feet (Gait) 400  - 400  1 standing  rest  -    [place] Level (Stairs) -- contact guard  -    Handrail Location (Stairs) -- right side (descending);left side (ascending)  -    Number of Steps (Stairs) -- 4  -MF    Ascending Technique (Stairs) -- step-to-step  -    Descending Technique (Stairs) -- step-to-step  -    Row Name 01/07/23 1515          Motor Skills    Comments, Therapeutic Exercise cardiac protocol warm up/ cool down  -     Row Name             Wound 01/03/23 1615 midline sternal Incision    Wound - Properties Group Placement Date: 01/03/23  -CO Placement Time: 1615 -CO Orientation: midline  -CO Location: sternal  -CO Primary Wound Type: Incision  -CO    Retired Wound - Properties Group Placement Date: 01/03/23  -CO Placement Time: 1615 -CO Orientation: midline  -CO Location: sternal  -CO Primary Wound Type: Incision  -CO    Retired Wound - Properties Group Date first assessed: 01/03/23  -CO Time first assessed: 1615  -CO Location: sternal  -CO Primary Wound Type: Incision  -CO    Row Name 01/07/23 1043          Plan of Care Review    Plan of Care Reviewed With patient  -     Progress improving  -     Outcome Evaluation Pt. doing well with therapy. Pt. was Independent with transfers. He walked 400' with Supervision/Independent . He went up/down 4 steps without difficulty. His O2 sat was 92% on RA following walk. Encouraged pt. to get up and walk as he pleases. Pt. will be safe to return home with spouse.  -     Row Name 01/07/23 1515 01/07/23 1043       Vital Signs    Post SpO2 (%) 94  - 92  -    O2 Delivery Post Treatment room air  - room air  -    Post Patient Position -- Sitting  -    Row Name 01/07/23 1515 01/07/23 1043       Positioning and Restraints    Pre-Treatment Position sitting in chair/recliner  - sitting in chair/recliner  -    Post Treatment Position chair  - chair  -    In Chair reclined;call light within reach;encouraged to call for assist;with family/caregiver  - reclined;call  light within reach;encouraged to call for assist;with family/caregiver  -          User Key  (r) = Recorded By, (t) = Taken By, (c) = Cosigned By    Initials Name Provider Type    Tabby White PTA Physical Therapist Assistant    Trisha Stephenson, RN Registered Nurse    Makenzie Christopher PTA Physical Therapist Assistant                Physical Therapy Education     Title: PT OT SLP Therapies (In Progress)     Topic: Physical Therapy (In Progress)     Point: Mobility training (Done)     Learning Progress Summary           Patient Acceptance, E, VU,DU,NR by MINA at 1/4/2023 0757    Comment: Benefits of activity, progression of PT, sternal prec                   Point: Home exercise program (Not Started)     Learner Progress:  Not documented in this visit.          Point: Body mechanics (Not Started)     Learner Progress:  Not documented in this visit.          Point: Precautions (Done)     Learning Progress Summary           Patient Acceptance, E, VU,DU,NR by MINA at 1/4/2023 0757    Comment: Benefits of activity, progression of PT, sternal prec                               User Key     Initials Effective Dates Name Provider Type Discipline    MINA 08/02/16 -  Parveen Corea, PT DPT Physical Therapist PT              PT Recommendation and Plan         Outcome Measures     Row Name 01/07/23 1043 01/06/23 0937 01/05/23 0953       How much help from another person do you currently need...    Turning from your back to your side while in flat bed without using bedrails? 3  -MF 3  -MF 4  -TB    Moving from lying on back to sitting on the side of a flat bed without bedrails? 3  -MF 3  -MF 4  -TB    Moving to and from a bed to a chair (including a wheelchair)? 4  -MF 4  -MF 3  -TB    Standing up from a chair using your arms (e.g., wheelchair, bedside chair)? 4  -MF 4  -MF 4  -TB    Climbing 3-5 steps with a railing? 3  -MF 3  -MF 3  -TB    To walk in hospital room? 4  -MF 3  -MF 3  -TB    AM-PAC 6 Clicks Score  (PT) 21  -MF 20  -MF 21  -TB       Functional Assessment    Outcome Measure Options AM-PAC 6 Clicks Basic Mobility (PT)  -MF AM-PAC 6 Clicks Basic Mobility (PT)  -MF AM-PAC 6 Clicks Basic Mobility (PT)  -TB          User Key  (r) = Recorded By, (t) = Taken By, (c) = Cosigned By    Initials Name Provider Type     Makenzie Parker, ZAC Physical Therapist Assistant    TB Tania Bearden, ZAC Physical Therapist Assistant                 Time Calculation:    PT Charges     Row Name 01/07/23 1528 01/07/23 1525 01/07/23 1058       Time Calculation    Start Time 1515  -AH 1515  - 1043  -    Stop Time 1528  -AH -- 1055  -    Time Calculation (min) 13 min  - -- 12 min  -    PT Received On -- -- 01/07/23  -       Time Calculation- PT    Total Timed Code Minutes- PT 13 minute(s)  - -- 12 minute(s)  -       Timed Charges    91845 - Gait Training Minutes  13  - -- 12  -       Total Minutes    Timed Charges Total Minutes 13  - -- 12  -     Total Minutes 13  - -- 12  -          User Key  (r) = Recorded By, (t) = Taken By, (c) = Cosigned By    Initials Name Provider Type     Tabby Kaur, ZAC Physical Therapist Assistant     Makenzie Parker, PTA Physical Therapist Assistant              Therapy Charges for Today     Code Description Service Date Service Provider Modifiers Qty    26861848648 HC GAIT TRAINING EA 15 MIN 1/7/2023 Tabby Kaur PTA GP 1          PT G-Codes  Outcome Measure Options: AM-PAC 6 Clicks Basic Mobility (PT)  AM-PAC 6 Clicks Score (PT): 21    Tabby Kaur PTA  1/7/2023

## 2023-01-08 ENCOUNTER — READMISSION MANAGEMENT (OUTPATIENT)
Dept: CALL CENTER | Facility: HOSPITAL | Age: 78
End: 2023-01-08
Payer: MEDICARE

## 2023-01-08 ENCOUNTER — APPOINTMENT (OUTPATIENT)
Dept: GENERAL RADIOLOGY | Facility: HOSPITAL | Age: 78
DRG: 236 | End: 2023-01-08
Payer: MEDICARE

## 2023-01-08 ENCOUNTER — TELEPHONE (OUTPATIENT)
Dept: CARDIAC SURGERY | Facility: CLINIC | Age: 78
End: 2023-01-08
Payer: MEDICARE

## 2023-01-08 VITALS
OXYGEN SATURATION: 94 % | RESPIRATION RATE: 18 BRPM | TEMPERATURE: 97.5 F | WEIGHT: 203.8 LBS | DIASTOLIC BLOOD PRESSURE: 52 MMHG | HEART RATE: 77 BPM | SYSTOLIC BLOOD PRESSURE: 99 MMHG | HEIGHT: 70 IN | BODY MASS INDEX: 29.18 KG/M2

## 2023-01-08 PROBLEM — I50.22 CHRONIC SYSTOLIC HEART FAILURE: Status: ACTIVE | Noted: 2023-01-08

## 2023-01-08 LAB
ANION GAP SERPL CALCULATED.3IONS-SCNC: 9 MMOL/L (ref 5–15)
BUN SERPL-MCNC: 27 MG/DL (ref 8–23)
BUN/CREAT SERPL: 18.4 (ref 7–25)
CALCIUM SPEC-SCNC: 8.5 MG/DL (ref 8.6–10.5)
CHLORIDE SERPL-SCNC: 107 MMOL/L (ref 98–107)
CO2 SERPL-SCNC: 25 MMOL/L (ref 22–29)
CREAT SERPL-MCNC: 1.47 MG/DL (ref 0.76–1.27)
DEPRECATED RDW RBC AUTO: 50.9 FL (ref 37–54)
EGFRCR SERPLBLD CKD-EPI 2021: 49.4 ML/MIN/1.73
ERYTHROCYTE [DISTWIDTH] IN BLOOD BY AUTOMATED COUNT: 14.2 % (ref 12.3–15.4)
GLUCOSE SERPL-MCNC: 113 MG/DL (ref 65–99)
HCT VFR BLD AUTO: 27.8 % (ref 37.5–51)
HGB BLD-MCNC: 8.6 G/DL (ref 13–17.7)
MCH RBC QN AUTO: 30 PG (ref 26.6–33)
MCHC RBC AUTO-ENTMCNC: 30.9 G/DL (ref 31.5–35.7)
MCV RBC AUTO: 96.9 FL (ref 79–97)
PLATELET # BLD AUTO: 257 10*3/MM3 (ref 140–450)
PMV BLD AUTO: 10.6 FL (ref 6–12)
POTASSIUM SERPL-SCNC: 3.6 MMOL/L (ref 3.5–5.2)
QT INTERVAL: 388 MS
QTC INTERVAL: 447 MS
RBC # BLD AUTO: 2.87 10*6/MM3 (ref 4.14–5.8)
SODIUM SERPL-SCNC: 141 MMOL/L (ref 136–145)
WBC NRBC COR # BLD: 9.36 10*3/MM3 (ref 3.4–10.8)

## 2023-01-08 PROCEDURE — 71045 X-RAY EXAM CHEST 1 VIEW: CPT

## 2023-01-08 PROCEDURE — 85027 COMPLETE CBC AUTOMATED: CPT | Performed by: NURSE PRACTITIONER

## 2023-01-08 PROCEDURE — 80048 BASIC METABOLIC PNL TOTAL CA: CPT | Performed by: NURSE PRACTITIONER

## 2023-01-08 PROCEDURE — 25010000002 ENOXAPARIN PER 10 MG: Performed by: NURSE PRACTITIONER

## 2023-01-08 RX ORDER — FUROSEMIDE 20 MG/1
20 TABLET ORAL DAILY
Qty: 5 TABLET | Refills: 0 | Status: SHIPPED | OUTPATIENT
Start: 2023-01-08

## 2023-01-08 RX ORDER — HYDROCODONE BITARTRATE AND ACETAMINOPHEN 5; 325 MG/1; MG/1
1 TABLET ORAL EVERY 6 HOURS PRN
Qty: 25 TABLET | Refills: 0 | Status: SHIPPED | OUTPATIENT
Start: 2023-01-08

## 2023-01-08 RX ADMIN — PANTOPRAZOLE SODIUM 40 MG: 40 TABLET, DELAYED RELEASE ORAL at 05:39

## 2023-01-08 RX ADMIN — FEBUXOSTAT 40 MG: 40 TABLET, FILM COATED ORAL at 08:18

## 2023-01-08 RX ADMIN — SACUBITRIL AND VALSARTAN 1 TABLET: 24; 26 TABLET, FILM COATED ORAL at 08:18

## 2023-01-08 RX ADMIN — ACETAMINOPHEN 650 MG: 325 TABLET, FILM COATED ORAL at 03:46

## 2023-01-08 RX ADMIN — OXYCODONE HYDROCHLORIDE 5 MG: 5 TABLET ORAL at 03:46

## 2023-01-08 RX ADMIN — ACETAMINOPHEN 650 MG: 325 TABLET, FILM COATED ORAL at 08:19

## 2023-01-08 RX ADMIN — ENOXAPARIN SODIUM 40 MG: 100 INJECTION SUBCUTANEOUS at 08:19

## 2023-01-08 RX ADMIN — CALCITRIOL 0.25 MCG: 0.25 CAPSULE ORAL at 08:18

## 2023-01-08 RX ADMIN — METHOCARBAMOL 500 MG: 500 TABLET, FILM COATED ORAL at 05:39

## 2023-01-08 RX ADMIN — ASPIRIN 81 MG: 81 TABLET, COATED ORAL at 08:18

## 2023-01-08 RX ADMIN — METOPROLOL SUCCINATE 25 MG: 25 TABLET, EXTENDED RELEASE ORAL at 08:18

## 2023-01-08 NOTE — PLAN OF CARE
Goal Outcome Evaluation:  Plan of Care Reviewed With: patient        Progress: no change  Outcome Evaluation: pt discharge today. CABG education done.

## 2023-01-08 NOTE — TELEPHONE ENCOUNTER
Patient was discharged home over the weekend.  He will need 1 week follow up with me with labs before appointment and 6 week follow up with Dr. Griffin.

## 2023-01-08 NOTE — DISCHARGE INSTRUCTIONS
SOMEONE FROM THE [facility] CALL CENTER WILL BE CONTACTING YOU AFTER DISCHARGE TO CHECK ON YOUR RECOVERY

## 2023-01-08 NOTE — PLAN OF CARE
Goal Outcome Evaluation:  Plan of Care Reviewed With: patient           Outcome Evaluation: Patient pain is better now that tubes are all out.  Patient received pain med at bed time for incisional pain and he stated that it would also help him withy sleep.  Patient to have a 1view cxr this am.  Cont. to monitor.  Call for concerns.

## 2023-01-08 NOTE — PROGRESS NOTES
"Patient name: Gallo Philip  Patient : 1945  VISIT # [id number]  MR #[id number]    Procedure:Procedure(s):  CORONARY ARTERY BYPASS GRAFTING X 1, LEFT INTERNAL MAMMARY ARTERY GRAFT, OFF-PUMP  Procedure Date:1/3/2023  POD:5 Days Post-Op    Subjective       Up in chair, reports feeling better and ready to go home.  Had a bowel movement and passing gas, abdomen soft and minimally distended.  Weight is down 4 pounds and remains 1 pound above baseline.  Creatinine continues to improve, 1.47 today.  Pain is controlled.  Ambulating well, 400 feet with PT.      Telemetry: Sinus rhythm 70s-80s       Objective     Visit Vitals  BP 94/59 (BP Location: Left arm, Patient Position: Sitting)   Pulse 73   Temp 97.6 °F (36.4 °C) (Oral)   Resp 18   Ht 177 cm (69.69\")   Wt 92.4 kg (203 lb 12.8 oz)   SpO2 95%   BMI 29.51 kg/m²   Baseline weight 202 pounds    Intake/Output Summary (Last 24 hours) at 2023 0829  Last data filed at 2023 0800  Gross per 24 hour   Intake 960 ml   Output 1475 ml   Net -515 ml       Lab:        IMAGES:       Imaging Results (Last 24 Hours)     Procedure Component Value Units Date/Time    XR Chest 1 View [[id number]] Collected: 23     Updated: 23    Narrative:      EXAM/TECHNIQUE: XR CHEST 1 VW-     INDICATION: Post-Op Heart Surgery     COMPARISON: Prior day     FINDINGS:     Enlarged but stable cardiac silhouette. Sternotomy wires are stable in  alignment. Interval decrease in LEFT lower lobe atelectasis. No new  airspace opacity. No large pleural effusion. No visible pneumothorax.       Impression:         Improved lung aeration with decreased atelectasis. No other interval  change.  This report was finalized on 2023 04:56 by Dr. Sammy Owens MD.        CXR: Mild left lower lobe atelectasis.  No pneumothorax.    Physical Exam:  General: Alert, oriented. No apparent distress. On room air  Cardiovascular: Regular rate and rhythm without murmur, rubs, or gallops.  "   Pulmonary: Clear to auscultation bilaterally without wheezing, rubs, or rales.  Chest: Sternotomy incision clean, dry, and intact. Sternum stable. No clicks.    Abdomen: Soft, slightly distended, and nontender.  Extremities: Warm, moves all extremities.  Mild peripheral edema  Neurologic:  Grossly intact with no focal deficits.            Impression:  Coronary artery disease  Hypertension  Chronic kidney disease, stage 3B  Former smoker  Overweight, BMI 28        Plan:  Pull pacing wires  Lasix 20 mg p.o. and potassium p.o. daily x5 at discharge  Routine post cardiac surgery care  Continue pulmonary toilet and ambulation    Discharge home, medications to be sent to [facility] in [place]    Follow-up with CAROL Yoder in 1 week and with Dr. Griffin in 5 weeks.    Discussed with patient and nursing      CAROL French  01/08/23  08:29 CST

## 2023-01-08 NOTE — THERAPY DISCHARGE NOTE
Acute Care - Physical Therapy Discharge Summary  [facility]       Patient Name: Gallo Philip  : 1945  MRN: [id number]    Today's Date: 2023                 Admit Date: 1/3/2023      PT Recommendation and Plan    Visit Dx:    ICD-10-CM ICD-9-CM   1. Coronary artery disease involving coronary bypass graft of native heart without angina pectoris  I25.810 414.05   2. Coronary artery disease involving native coronary artery of native heart without angina pectoris  I25.10 414.01   3. Impaired mobility  Z74.09 799.89        Outcome Measures     Row Name 23 1043 23 0937          How much help from another person do you currently need...    Turning from your back to your side while in flat bed without using bedrails? 3  -MF 3  -MF     Moving from lying on back to sitting on the side of a flat bed without bedrails? 3  -MF 3  -MF     Moving to and from a bed to a chair (including a wheelchair)? 4  -MF 4  -MF     Standing up from a chair using your arms (e.g., wheelchair, bedside chair)? 4  -MF 4  -MF     Climbing 3-5 steps with a railing? 3  -MF 3  -MF     To walk in hospital room? 4  -MF 3  -MF     AM-PAC 6 Clicks Score (PT) 21  -MF 20  -MF        Functional Assessment    Outcome Measure Options AM-PAC 6 Clicks Basic Mobility (PT)  -MF AM-PAC 6 Clicks Basic Mobility (PT)  -MF           User Key  (r) = Recorded By, (t) = Taken By, (c) = Cosigned By    Initials Name Provider Type    Makenzie Christohper, PTA Physical Therapist Assistant                     PT Rehab Goals     Row Name 23 1513             Bed Mobility Goal 1 (PT)    Activity/Assistive Device (Bed Mobility Goal 1, PT) sit to supine/supine to sit  -      [place] Level/Cues Needed (Bed Mobility Goal 1, PT) supervision required  -      Time Frame (Bed Mobility Goal 1, PT) long term goal (LTG);10 days  -      Progress/Outcomes (Bed Mobility Goal 1, PT) goal not met  -         Transfer Goal 1 (PT)    Activity/Assistive  Device (Transfer Goal 1, PT) sit-to-stand/stand-to-sit;bed-to-chair/chair-to-bed  -      [place] Level/Cues Needed (Transfer Goal 1, PT) supervision required  -AH      Time Frame (Transfer Goal 1, PT) long term goal (LTG);10 days  -AH      Progress/Outcome (Transfer Goal 1, PT) goal met  -         Gait Training Goal 1 (PT)    Activity/Assistive Device (Gait Training Goal 1, PT) gait (walking locomotion);decrease fall risk;improve balance and speed;increase endurance/gait distance  -      [place] Level (Gait Training Goal 1, PT) supervision required  -      Distance (Gait Training Goal 1, PT) 250 ft  -AH      Time Frame (Gait Training Goal 1, PT) long term goal (LTG);10 days  -AH      Progress/Outcome (Gait Training Goal 1, PT) goal met  -         Stairs Goal 1 (PT)    Activity/Assistive Device (Stairs Goal 1, PT) ascending stairs;descending stairs  -      [place] Level/Cues Needed (Stairs Goal 1, PT) supervision required  -      Number of Stairs (Stairs Goal 1, PT) 4 steps  -AH      Time Frame (Stairs Goal 1, PT) long term goal (LTG);10 days  -AH      Progress/Outcome (Stairs Goal 1, PT) goal met  -            User Key  (r) = Recorded By, (t) = Taken By, (c) = Cosigned By    Initials Name Provider Type Discipline     Tabby Kaur PTA Physical Therapist Assistant PT                Therapy Charges for Today     Code Description Service Date Service Provider Modifiers Qty    [id number]  GAIT TRAINING EA 15 MIN 1/7/2023 Tabby Kaur PTA GP 1          PT Discharge Summary  Reason for Discharge: Discharge from facility  Outcomes Achieved: Refer to plan of care for updates on goals achieved  Discharge Destination: Home      Tabby Kaur PTA   1/8/2023

## 2023-01-09 NOTE — TELEPHONE ENCOUNTER
Called wife and appts sched as directed.  She is asking about her work release form for [employer] (from [employer]).  States she called a week or so ago and was told it was all completed and just waiting for Dr Griffin's signature.  She is calling to check status on this.  She is requesting a call back re: this/geraldine

## 2023-01-09 NOTE — TELEPHONE ENCOUNTER
Called Mrs. Philip re: this and she insists that she was instructed that our office should fill out our part and fax it in and then [employer] would review it and send it to her for her signature and then she would have to have it back to them by 1-22-22.  I offered mult times for her to come sign this before we fax it but she refused stating that was not what she was instructed to do.  Advised pt we would fax this in today/geraldine

## 2023-01-09 NOTE — OUTREACH NOTE
Prep Survey    Flowsheet Row Responses   [facility] facility patient discharged from? [place]   Is LACE score < 7 ? No   Eligibility Readm Mgmt   Discharge diagnosis  Severe coronary disease with decreased LVEF CABG   Does the patient have one of the following disease processes/diagnoses(primary or secondary)? Cardiothoracic surgery   Does the patient have Home health ordered? No   Is there a DME ordered? No   Prep survey completed? Yes          JACKELINE VARGAS - Registered Nurse

## 2023-01-09 NOTE — TELEPHONE ENCOUNTER
Form faxed to [employer] @ [phone]. Patient's wife requests form be mailed to her as well. Mailed today.

## 2023-01-09 NOTE — TELEPHONE ENCOUNTER
I do have this completed now as I was waiting for patient discharge date to include on the form. There are a couple of pages that she will need to sign. If she wants to come by to sign this she can, or can wait until his OV on 1/16. The form is not due until 1/22. Once she signs, I can fax it to [employer] at that time.

## 2023-01-11 ENCOUNTER — READMISSION MANAGEMENT (OUTPATIENT)
Dept: CALL CENTER | Facility: HOSPITAL | Age: 78
End: 2023-01-11
Payer: MEDICARE

## 2023-01-11 NOTE — OUTREACH NOTE
CT Surgery Week 1 Survey    Flowsheet Row Responses   [facility] patient discharged from? [place]   Does the patient have one of the following disease processes/diagnoses(primary or secondary)? Cardiothoracic surgery   Week 1 attempt successful? Yes   Call start time 1326   Call end time 1335   Discharge diagnosis  Severe coronary disease with decreased LVEF CABG   Person spoke with today (if not patient) and relationship spouse   Meds reviewed with patient/caregiver? Yes   Is the patient having any side effects they believe may be caused by any medication additions or changes? No   Does the patient have all medications related to this admission filled (includes all antibiotics, pain medications, cardiac medications, etc.) Yes   Is the patient taking all medications as directed (includes completed medication regime)? Yes   Does the patient have a primary care provider?  Yes  [1/18/23]   Does the patient have an appointment scheduled with their C/T surgeon? Yes  [1/16/23]   Has the patient kept scheduled appointments due by today? N/A   Comments Spouse advised to make a cardiologist fu appt for 6 weeks   Psychosocial issues? No   Did the patient receive a copy of their discharge instructions? Yes   Nursing interventions Reviewed instructions with patient   What is the patient's perception of their health status since discharge? Improving   Nursing interventions Nurse provided patient education   Is the patient/caregiver able to teach back normal signs of recovery? Nausea and lack of appetite, Pain or discomfort at incisional site   Nursing interventions Reassured on normal signs of recovery   Is the patient /caregiver able to teach back basic post-op care? No tub bath, swimming, or hot tub until instructed by MD, Use a clean wash cloth and antibacterial bar or liquid soap to clean incisions, Lifting as instructed by MD in discharge instructions, Shower daily   Is the patient/caregiver able to teach back signs  and symptoms of incisional infection? Increased drainage or bleeding, Increased redness, swelling or pain at the incisonal site, Fever, Incisional warmth, Pus or odor from incision   Is the patient/caregiver able to teach back steps to recovery at home? Rest and rebuild strength, gradually increase activity, Eat a well-balance diet   If the patient is a current smoker, are they able to teach back resources for cessation? Not a smoker   Is the patient/caregiver able to teach back the hierarchy of who to call/visit for symptoms/problems? PCP, Specialist, Home health nurse, Urgent Care, ED, 911 Yes   Week 1 call completed? Yes   Is the patient interested in additional calls from an ambulatory ?  NOTE:  applies to high risk patients requiring additional follow-up. No   Wrap up additional comments Spouse states pt is doing good, and taking Norco for pain management when needed. Reviewed AVS/medications with spouse. Spouse verfied post-op appt on 1/16/22, PCP hospital fu appt on 1/18/23, and advised to make a cardiologist fu appt.            ADEN Chambers Registered

## 2023-01-12 LAB
MAXIMAL PREDICTED HEART RATE: 145 BPM
STRESS TARGET HR: 123 BPM

## 2023-01-13 NOTE — PROGRESS NOTES
Subjective   Chief Complaint   Patient presents with   • Post-op Follow-up     Patient had CABG x 1 on 1/3        Patient ID: Gallo Philip is a 77 y.o. male who is here for follow-up having had Coronary Artery Bypass, using arterial graft; one arterial graft by Dr. Griffin on 1/3/2023    History of Present Illness  Post operative recovery was uneventful without any major complications. Sleep habits are fair.  He states its hard to get comfortable in recliner. Pain control has been good.  No fevers/sweats/chills. No drainage from incisions.  Occasional sternal click with deep breaths. No chest pain or shortness of breath. Appetite is good. He is not diabetic and he does not smoke.  Ambulating 5-8 minutes twice daily.   He was 1 pound above his baseline weight at the time of discharge and was sent home with Lasix 20 mg daily for 5 days.  Weight today is down 5 pounds since discharge and he is below his baseline weight.  He reports no lower extremity edema.  Of note creatinine at the time of discharge was 1.47.  Labs have been repeated today and creatinine is stable at 1.5.  WBC is up to 17.  He does report a gout flareup for which he contacted his primary care provider and he has taken a course of steroids.  He states this is much better and he is now able to walk again.    The following portions of the patient's history were reviewed and updated as appropriate: allergies, current medications, past family history, past medical history, past social history, past surgical history and problem list.    Review of Systems   Constitutional: Negative for chills, diaphoresis, fatigue and fever.   HENT: Negative for trouble swallowing and voice change.    Eyes: Negative for visual disturbance.   Respiratory: Negative for chest tightness and shortness of breath.    Cardiovascular: Negative for chest pain, palpitations and leg swelling.   Gastrointestinal: Negative for abdominal pain, diarrhea, nausea and vomiting.  "  Musculoskeletal: Negative for arthralgias and myalgias.   Skin: Negative for color change, pallor, rash and wound.   Neurological: Negative for dizziness, syncope and light-headedness.   Psychiatric/Behavioral: Negative for agitation, confusion and sleep disturbance.       Objective   Visit Vitals  /62 (BP Location: Right arm, Patient Position: Sitting, Cuff Size: Adult)   Pulse 79   Ht 177 cm (69.69\")   Wt 90.1 kg (198 lb 9.6 oz)   SpO2 99%   BMI 28.75 kg/m²       Physical Exam  Vitals reviewed.   Constitutional:       General: He is not in acute distress.     Appearance: Normal appearance.   HENT:      Head: Normocephalic.   Eyes:      Pupils: Pupils are equal, round, and reactive to light.   Cardiovascular:      Rate and Rhythm: Normal rate and regular rhythm.      Heart sounds: Normal heart sounds. No murmur heard.  Pulmonary:      Breath sounds: Normal breath sounds. No wheezing or rales.   Abdominal:      General: There is no distension.      Palpations: Abdomen is soft.      Tenderness: There is no abdominal tenderness.   Musculoskeletal:         General: No swelling or tenderness.   Skin:     General: Skin is warm and dry.      Comments: Sternum is stable, no appreciable click on exam.  Sternal incision is clean dry and intact and healing nicely.   Neurological:      General: No focal deficit present.      Mental Status: He is alert and oriented to person, place, and time.   Psychiatric:         Mood and Affect: Mood normal.         Behavior: Behavior normal.         Thought Content: Thought content normal.         Judgment: Judgment normal.             Assessment & Plan           Diagnoses and all orders for this visit:    1. Coronary artery disease involving native coronary artery of native heart without angina pectoris (Primary)    2. Primary hypertension    3. Overweight (BMI 25.0-29.9)    4. Stage 3b chronic kidney disease (HCC)    5. Former smoker           Overall, Gallo Philip is doing " well.  We discussed current sternotomy precautions and how these will not be advanced yet.  I have reminded him of the importance of strict sternal precautions in the setting of sternal click with deep breathing and especially given steroid use postoperatively.  He verbalized understanding to this.  Labs today are stable.  WBC elevation likely secondary to Medrol Dosepak.  Sternal incision is healing nicely with no erythema, drainage, or warmth.  No fevers at home.  Following post op cardiac surgery home instructions. Provided support and encouragement. All questions have been answered to the best of my ability. Will discuss starting cardiac rehabilitation at official 1 month post op visit. Patient has follow up with Dr. Griffin in a few weeks. Patient does not have a scheduled 6-week follow-up with cardiology.  We will reach out to Dr. Cash's office to see if we can get this set up for the patient.  Patient has been instructed to contact our office with any questions or concerns should they arise prior to the next office visit.  Keep scheduled follow-up with Dr. Griffin on 2/20/2023.    BMI is >= 25 and <30. (Overweight) The following options were offered after discussion;: referral to primary care     Gallo Philip is a non-smoker and therefore does not need tobacco cessation education/counseling.        Advance Care Planning   ACP discussion was held with the patient during this visit. Patient does not have an advance directive, declines further assistance.

## 2023-01-16 ENCOUNTER — OFFICE VISIT (OUTPATIENT)
Dept: CARDIAC SURGERY | Facility: CLINIC | Age: 78
End: 2023-01-16
Payer: MEDICARE

## 2023-01-16 ENCOUNTER — LAB (OUTPATIENT)
Dept: LAB | Facility: HOSPITAL | Age: 78
End: 2023-01-16
Payer: MEDICARE

## 2023-01-16 VITALS
HEART RATE: 79 BPM | OXYGEN SATURATION: 99 % | SYSTOLIC BLOOD PRESSURE: 104 MMHG | HEIGHT: 70 IN | DIASTOLIC BLOOD PRESSURE: 62 MMHG | BODY MASS INDEX: 28.43 KG/M2 | WEIGHT: 198.6 LBS

## 2023-01-16 DIAGNOSIS — I25.810 CORONARY ARTERY DISEASE INVOLVING CORONARY BYPASS GRAFT OF NATIVE HEART WITHOUT ANGINA PECTORIS: ICD-10-CM

## 2023-01-16 DIAGNOSIS — I25.10 CORONARY ARTERY DISEASE INVOLVING NATIVE CORONARY ARTERY OF NATIVE HEART WITHOUT ANGINA PECTORIS: Primary | ICD-10-CM

## 2023-01-16 DIAGNOSIS — Z87.891 FORMER SMOKER: ICD-10-CM

## 2023-01-16 DIAGNOSIS — N18.32 STAGE 3B CHRONIC KIDNEY DISEASE: ICD-10-CM

## 2023-01-16 DIAGNOSIS — I10 PRIMARY HYPERTENSION: ICD-10-CM

## 2023-01-16 DIAGNOSIS — E66.3 OVERWEIGHT (BMI 25.0-29.9): ICD-10-CM

## 2023-01-16 LAB
ANION GAP SERPL CALCULATED.3IONS-SCNC: 10 MMOL/L (ref 5–15)
BASOPHILS # BLD AUTO: 0.13 10*3/MM3 (ref 0–0.2)
BASOPHILS NFR BLD AUTO: 0.7 % (ref 0–1.5)
BUN SERPL-MCNC: 29 MG/DL (ref 8–23)
BUN/CREAT SERPL: 19.2 (ref 7–25)
CALCIUM SPEC-SCNC: 9.2 MG/DL (ref 8.6–10.5)
CHLORIDE SERPL-SCNC: 107 MMOL/L (ref 98–107)
CO2 SERPL-SCNC: 28 MMOL/L (ref 22–29)
CREAT SERPL-MCNC: 1.51 MG/DL (ref 0.76–1.27)
DEPRECATED RDW RBC AUTO: 50.5 FL (ref 37–54)
EGFRCR SERPLBLD CKD-EPI 2021: 47.9 ML/MIN/1.73
EOSINOPHIL # BLD AUTO: 0.13 10*3/MM3 (ref 0–0.4)
EOSINOPHIL NFR BLD AUTO: 0.7 % (ref 0.3–6.2)
ERYTHROCYTE [DISTWIDTH] IN BLOOD BY AUTOMATED COUNT: 14 % (ref 12.3–15.4)
GLUCOSE SERPL-MCNC: 128 MG/DL (ref 65–99)
HCT VFR BLD AUTO: 41.6 % (ref 37.5–51)
HGB BLD-MCNC: 13 G/DL (ref 13–17.7)
IMM GRANULOCYTES # BLD AUTO: 0.38 10*3/MM3 (ref 0–0.05)
IMM GRANULOCYTES NFR BLD AUTO: 2.1 % (ref 0–0.5)
LYMPHOCYTES # BLD AUTO: 3.42 10*3/MM3 (ref 0.7–3.1)
LYMPHOCYTES NFR BLD AUTO: 19 % (ref 19.6–45.3)
MCH RBC QN AUTO: 30.9 PG (ref 26.6–33)
MCHC RBC AUTO-ENTMCNC: 31.3 G/DL (ref 31.5–35.7)
MCV RBC AUTO: 98.8 FL (ref 79–97)
MONOCYTES # BLD AUTO: 1.11 10*3/MM3 (ref 0.1–0.9)
MONOCYTES NFR BLD AUTO: 6.2 % (ref 5–12)
NEUTROPHILS NFR BLD AUTO: 12.82 10*3/MM3 (ref 1.7–7)
NEUTROPHILS NFR BLD AUTO: 71.3 % (ref 42.7–76)
NRBC BLD AUTO-RTO: 0 /100 WBC (ref 0–0.2)
PLATELET # BLD AUTO: 789 10*3/MM3 (ref 140–450)
PMV BLD AUTO: 9.5 FL (ref 6–12)
POTASSIUM SERPL-SCNC: 4.2 MMOL/L (ref 3.5–5.2)
RBC # BLD AUTO: 4.21 10*6/MM3 (ref 4.14–5.8)
SODIUM SERPL-SCNC: 145 MMOL/L (ref 136–145)
WBC NRBC COR # BLD: 17.99 10*3/MM3 (ref 3.4–10.8)

## 2023-01-16 PROCEDURE — 36415 COLL VENOUS BLD VENIPUNCTURE: CPT

## 2023-01-16 PROCEDURE — 99024 POSTOP FOLLOW-UP VISIT: CPT | Performed by: NURSE PRACTITIONER

## 2023-01-16 PROCEDURE — 80048 BASIC METABOLIC PNL TOTAL CA: CPT

## 2023-01-16 PROCEDURE — 85025 COMPLETE CBC W/AUTO DIFF WBC: CPT

## 2023-01-17 ENCOUNTER — TELEPHONE (OUTPATIENT)
Dept: CARDIAC SURGERY | Facility: CLINIC | Age: 78
End: 2023-01-17

## 2023-01-17 NOTE — TELEPHONE ENCOUNTER
Caller: Mitzy Philip    Relationship: Emergency Contact    Best call back number: [phone]    Who are you requesting to speak with (clinical staff, provider,  specific staff member): CLINICAL     What was the call regarding: PATIENT WOULD LIKE TO KNOW IF THERE IS ANY RESTRICTIONS ON HIM GOING OUT TO EAT TO [employer] AND IF SO UNTIL WHEN?    Do you require a callback: YES PLEASE

## 2023-01-18 ENCOUNTER — READMISSION MANAGEMENT (OUTPATIENT)
Dept: CALL CENTER | Facility: HOSPITAL | Age: 78
End: 2023-01-18
Payer: MEDICARE

## 2023-01-18 NOTE — OUTREACH NOTE
CT Surgery Week 2 Survey    Flowsheet Row Responses   [facility] facility patient discharged from? [place]   Does the patient have one of the following disease processes/diagnoses(primary or secondary)? Cardiothoracic surgery   Week 2 attempt successful? No   Unsuccessful attempts Attempt 1          FEROZ DANG - Registered Nurse

## 2023-01-20 ENCOUNTER — READMISSION MANAGEMENT (OUTPATIENT)
Dept: CALL CENTER | Facility: HOSPITAL | Age: 78
End: 2023-01-20
Payer: MEDICARE

## 2023-01-20 NOTE — OUTREACH NOTE
CT Surgery Week 1 Survey    Flowsheet Row Responses   [facility] patient discharged from? [place]   Does the patient have one of the following disease processes/diagnoses(primary or secondary)? Cardiothoracic surgery   Call start time 1259   Call end time 1302   Discharge diagnosis  Severe coronary disease with decreased LVEF CABG   Meds reviewed with patient/caregiver? Yes   Is the patient having any side effects they believe may be caused by any medication additions or changes? No   Does the patient have all medications related to this admission filled (includes all antibiotics, pain medications, cardiac medications, etc.) Yes   Is the patient taking all medications as directed (includes completed medication regime)? Yes   Does the patient have a primary care provider?  Yes   Has the patient kept scheduled appointments due by today? Yes   Has home health visited the patient within 72 hours of discharge? N/A   Psychosocial issues? No   Did the patient receive a copy of their discharge instructions? Yes   Nursing interventions Reviewed instructions with patient   What is the patient's perception of their health status since discharge? Improving   Nursing interventions Nurse provided patient education   Is the patient/caregiver able to teach back normal signs of recovery? Pain or discomfort at incisional site   Nursing interventions Reassured on normal signs of recovery   Is the patient /caregiver able to teach back basic post-op care? Hold pillow to support chest when coughing, Drive as instructed by MD in discharge instructions   Is the patient/caregiver able to teach back signs and symptoms of incisional infection? Increased redness, swelling or pain at the incisonal site, Increased drainage or bleeding, Incisional warmth, Pus or odor from incision, Fever   Is the patient/caregiver able to teach back steps to recovery at home? Set small, achievable goals for return to baseline health, Rest and rebuild  strength, gradually increase activity   Is the patient /caregiver able to teach back the importance of cardiac rehab? Yes   If the patient is a current smoker, are they able to teach back resources for cessation? Not a smoker   Is the patient/caregiver able to teach back the hierarchy of who to call/visit for symptoms/problems? PCP, Specialist, Home health nurse, Urgent Care, ED, 911 Yes            HOSEA HUERTA - Licensed Nurse

## 2023-02-20 ENCOUNTER — OFFICE VISIT (OUTPATIENT)
Dept: CARDIAC SURGERY | Facility: CLINIC | Age: 78
End: 2023-02-20
Payer: MEDICARE

## 2023-02-20 VITALS
DIASTOLIC BLOOD PRESSURE: 72 MMHG | HEART RATE: 82 BPM | WEIGHT: 199 LBS | SYSTOLIC BLOOD PRESSURE: 102 MMHG | HEIGHT: 70 IN | BODY MASS INDEX: 28.49 KG/M2 | OXYGEN SATURATION: 96 %

## 2023-02-20 DIAGNOSIS — I25.10 CORONARY ARTERY DISEASE INVOLVING NATIVE CORONARY ARTERY OF NATIVE HEART WITHOUT ANGINA PECTORIS: Primary | ICD-10-CM

## 2023-02-20 DIAGNOSIS — Z95.1 S/P CABG X 1: Primary | ICD-10-CM

## 2023-02-20 PROCEDURE — 99024 POSTOP FOLLOW-UP VISIT: CPT | Performed by: SURGERY

## 2023-02-20 NOTE — PROGRESS NOTES
"[facility] Cardiothoracic Surgery  PROGRESS NOTE          Procedure Performed: Coronary Artery Bypass, LIMA to LAD  Date of Procedure: 01/03/2023    Subjective:    Gallo Philip is a 77-year-old male who is now 6 weeks status post coronary artery bypass. He is accompanied by an adult female    In summary, the patient maintains a general feeling of wellness but reports some pain on the left side of his sternum with achiness when he moves. He denies any clicking or popping sounds. The patient is having some pain when breathing in and maintains he has minimal pain when sneezing and coughing. His bout with gout kept him from walking for 3 weeks but he since has walked 1 full mile as of 02/19/2023. His breathing has improved significantly and he has less difficulty breathing since the surgery. He was advised his pain may be associated with tiny rib fractures from spreading his rib cage to perform the surgery. The patient is currently retired and the adult female reports his appetite has been suppressed.  He has expressed happiness with his current care.       Objective:      02/20/23  1251   Weight: 90.3 kg (199 lb)              PE:  Visit Vitals  /72   Pulse 82   Ht 177 cm (69.69\")   Wt 90.3 kg (199 lb)   SpO2 96%   BMI 28.81 kg/m²        GENERAL: NAD, resting comfortably, normal color  CARDIOVASCULAR: regular, regular rate, sinus, well-healed sternotomy with stable sternum  PULMONARY: Normal bilateral breath sounds, no labored breathing  ABDOMEN: soft, nt/nd  EXTREMITIES: mild peripheral edema, normal pulses, normal ROM    Mr. Orozcos sternum is stable. He points to some left sided pain to the left side of his sternum focally in the midsternal portion. All of his incisions have healed well.               Lab Results (last 72 hours)      ** No results found for the last 72 hours. **         Assessment/Plan         Mr. Philip is a. 77-year-old male who is postop 6 weeks from coronary artery " bypass grafting x1 with [place] to LAD, this was done off pump due to his severe preoperative kidney dysfunction. He has done very well since surgery. No unexpected postoperative complications. His creatinine is actually better than it was preoperative. He feels better and his heart failure symptoms have resolved. No problems with wound healing. I discussed with him going back to normal activity on a progressive basis. He understands and agrees. We discussed returning to driving, which is okay.     He prefers cardiac rehab at [facility], we will make that referral.     I would prefer him to remain on DAPT for 3 months, then okay to transition to aspirin only if okay with his primary cardiologist, Dr. Cash. He has a follow-up scheduled with Dr. Cash in a couple weeks.     Mr. Philip can follow up with me on an as needed basis.     Thank you for trusting me with the care of Mr. Philip. Please do not hesitate to call with questions or concerns.       Keith Griffin MD   Cardiothoracic Surgeon    Transcribed from ambient dictation for Keith Griffin MD by Fortunato Mast.  02/20/23   14:17 CST    Patient or patient representative verbalized consent to the visit recording.  I have personally performed the services described in this document as transcribed by the above individual, and it is both accurate and complete.

## 2023-03-02 ENCOUNTER — TELEPHONE (OUTPATIENT)
Dept: CARDIAC SURGERY | Facility: CLINIC | Age: 78
End: 2023-03-02

## 2023-03-02 NOTE — TELEPHONE ENCOUNTER
Caller: Mitzy Philip    Relationship: Emergency Contact    Best call back number: [phone]    What was the call regarding: PATIENT HAS NOT HEARD FROM [facility] CARDIAC REHAB. IF POSSIBLE HE WOULD LIKE AN UPDATE ON SCHEDULING.    Do you require a callback: YES PLEASE

## 2023-03-03 NOTE — TELEPHONE ENCOUNTER
Spoke with pt's wife and informed her this was sent on 02/20/2023. Advised her to contact [facility] and if they need the referral sent again, I will be happy to do so. She voiced understanding.

## 2023-03-08 ENCOUNTER — OFFICE VISIT (OUTPATIENT)
Dept: CARDIOLOGY | Facility: CLINIC | Age: 78
End: 2023-03-08
Payer: MEDICARE

## 2023-03-08 VITALS
WEIGHT: 203 LBS | DIASTOLIC BLOOD PRESSURE: 72 MMHG | SYSTOLIC BLOOD PRESSURE: 118 MMHG | BODY MASS INDEX: 29.06 KG/M2 | HEIGHT: 70 IN | HEART RATE: 78 BPM

## 2023-03-08 DIAGNOSIS — I25.810 CORONARY ARTERY DISEASE INVOLVING CORONARY BYPASS GRAFT OF NATIVE HEART WITHOUT ANGINA PECTORIS: Primary | ICD-10-CM

## 2023-03-08 DIAGNOSIS — R06.02 SHORTNESS OF BREATH: ICD-10-CM

## 2023-03-08 DIAGNOSIS — E78.2 HYPERLIPEMIA, MIXED: ICD-10-CM

## 2023-03-08 DIAGNOSIS — I49.3 PVC (PREMATURE VENTRICULAR CONTRACTION): ICD-10-CM

## 2023-03-08 DIAGNOSIS — I25.10 CORONARY ARTERY DISEASE INVOLVING NATIVE CORONARY ARTERY OF NATIVE HEART WITHOUT ANGINA PECTORIS: ICD-10-CM

## 2023-03-08 PROCEDURE — 99214 OFFICE O/P EST MOD 30 MIN: CPT | Performed by: INTERNAL MEDICINE

## 2023-03-08 RX ORDER — SPIRONOLACTONE 25 MG/1
25 TABLET ORAL DAILY
COMMUNITY

## 2023-03-08 NOTE — PROGRESS NOTES
Gallo Philip  [id number]  1945  78 y.o.  male    Referring Provider: Carol Mccallum APRN    Reason for  Visit: Here for routine follow up   Recent admission and discharge as below   Records reviewed          Subjective    Mild chronic exertional shortness of breath on exertion relieved with rest  No significant cough or wheezing    No palpitations  No associated chest pain  No significant pedal edema    No fever or chills  No significant expectoration    No hemoptysis  No presyncope or syncope    Tolerating current medications well with no untoward side effects   Compliant with prescribed medication regimen. Tries to adhere to cardiac diet.     Overall much better     Surgical wound healed very well. No evidence of excessive bruising, pain, redness, swelling, discharge or foul odor noted  Patient declined any recent history of fever, chills, pain or warmth locally     Starting cardiac rehab [facility] next week   BP well controlled at home.       No bleeding, excessive bruising, gait instability or fall risks        Labs       History of present illness:  Gallo Philip is a 78 y.o. yo male with coronary artery disease coronary artery bypass grafting who presents today for   Chief Complaint   Patient presents with   • Chest Pain     3 mo f/u   • Congestive Heart Failure   .    History  Past Medical History:   Diagnosis Date   • JAZ (acute kidney injury) (HCC)    • Bleeding ulcer    • Chronic combined systolic and diastolic heart failure (HCC) 08/05/2022   • Coronary artery disease involving native coronary artery of native heart without angina pectoris 06/24/2022   • Hyperlipidemia LDL goal <70 08/23/2022   • Hypertension    • Hypertensive nephrosclerosis    • Shortness of breath    • Stage 3b chronic kidney disease (HCC) 08/23/2022   • Stroke (HCC)     2013 - ON PLAVIX   ,   Past Surgical History:   Procedure Laterality Date   • APPENDECTOMY     • CARDIAC CATHETERIZATION Left 8/5/2022     Procedure: Cardiac Catheterization/Vascular Study;  Surgeon: Hero Cash MD;  Location:  [facility] CATH INVASIVE [facility];  Service: Cardiology;  Laterality: Left;   • CORONARY ARTERY BYPASS GRAFT N/A 1/3/2023    Procedure: CORONARY ARTERY BYPASS GRAFTING X 1, LEFT INTERNAL MAMMARY ARTERY GRAFT, OFF-PUMP;  Surgeon: Keith Griffin MD;  Location:  [facility] OR;  Service: Cardiothoracic;  Laterality: N/A;   • KIDNEY STONE SURGERY     • REPLACEMENT TOTAL KNEE Left    ,   Family History   Problem Relation Age of Onset   • No Known Problems Mother    • Heart attack Father    • Heart disease Father    ,   Social History     Tobacco Use   • Smoking status: Former     Packs/day: 0.25     Years: 10.00     Pack years: 2.50     Types: Cigarettes     Quit date: 1969     Years since quittin.2   • Smokeless tobacco: Never   Vaping Use   • Vaping Use: Never used   Substance Use Topics   • Alcohol use: No   • Drug use: No   ,     Medications  Current Outpatient Medications   Medication Sig Dispense Refill   • aspirin 81 MG tablet Take 1 tablet by mouth Daily.     • atorvastatin (LIPITOR) 80 MG tablet Take 1 tablet by mouth Every Night. 90 tablet 3   • calcitriol (ROCALTROL) 0.25 MCG capsule Take 1 capsule by mouth Daily. 30 capsule 3   • clopidogrel (PLAVIX) 75 MG tablet Take 1 tablet by mouth Daily.     • febuxostat (ULORIC) 40 MG tablet Take 1 tablet by mouth Daily.     • furosemide (Lasix) 20 MG tablet Take 1 tablet by mouth Daily. 5 tablet 0   • metoprolol succinate XL (TOPROL-XL) 25 MG 24 hr tablet Take 1 tablet by mouth Daily. 90 tablet 3   • pantoprazole (PROTONIX) 40 MG EC tablet Take 1 tablet by mouth Every Morning. 30 tablet 3   • sacubitril-valsartan (Entresto) 24-26 MG tablet Take 1 tablet by mouth 2 (Two) Times a Day. 60 tablet 11   • colchicine 0.6 MG tablet Take 1 tablet by mouth Daily.     • HYDROcodone-acetaminophen (Norco) 5-325 MG per tablet Take 1 tablet by mouth Every 6 (Six) Hours As Needed for  "Moderate Pain. 25 tablet 0   • spironolactone (ALDACTONE) 25 MG tablet Take 1 tablet by mouth Daily.       No current facility-administered medications for this visit.       Allergies:  Patient has no known allergies.    Review of Systems  Review of Systems   Constitutional: Negative.   HENT: Negative.    Eyes: Negative.    Cardiovascular: Negative for chest pain, claudication, cyanosis, dyspnea on exertion, irregular heartbeat, leg swelling, near-syncope, orthopnea, palpitations, paroxysmal nocturnal dyspnea and syncope.   Respiratory: Negative.    Endocrine: Negative.    Hematologic/Lymphatic: Negative.    Skin: Negative.    Gastrointestinal: Negative for anorexia.   Genitourinary: Negative.    Neurological: Negative.    Psychiatric/Behavioral: Negative.        Objective     Physical Exam:  /72   Pulse 78   Ht 177.8 cm (70\")   Wt 92.1 kg (203 lb)   BMI 29.13 kg/m²     Physical Exam  Constitutional:       Appearance: He is well-developed.   HENT:      Head: Normocephalic.   Neck:      Vascular: Normal carotid pulses. No carotid bruit or JVD.      Trachea: No tracheal tenderness or tracheal deviation.   Cardiovascular:      Rate and Rhythm: Regular rhythm.      Pulses: Normal pulses.      Heart sounds: Murmur heard.    Systolic murmur is present with a grade of 2/6.  Pulmonary:      Effort: Pulmonary effort is normal.      Breath sounds: No stridor.   Abdominal:      Palpations: Abdomen is soft.   Musculoskeletal:      Cervical back: No edema.   Skin:     General: Skin is warm.   Neurological:      Mental Status: He is alert.      Cranial Nerves: No cranial nerve deficit.      Sensory: No sensory deficit.   Psychiatric:         Speech: Speech normal.         Behavior: Behavior normal.     Surgical wound healed very well. No evidence of excessive bruising, pain, redness, swelling, discharge or foul odor noted  Patient declined any recent history of fever, chills, pain or warmth locally         Results " "Review:      Gallo Philip  Complete Transthoracic Echocardiogram with Complete Doppler, Color Flow and Contrast  Order# [id number]  Reading physician: Hero Cash MD Ordering physician: Hero Cash MD Study date: 22     Patient Information    Patient Name   Gallo Philip MRN   [id number] Legal Sex   Male  (Age)   1945 (78 y.o.)     Patient Hx Of Height, Weight, and Vitals    Height Weight BSA (Calculated - sq m) BMI (Calculated) Retired BMI (kg/m2) Pulse BP   177.8 cm (70\") 88.9 kg (196 lb) 2.07 sq meters 28.1  71 131/82      [facility] PACS Images     Show images for Adult Transthoracic Echo Complete w/ Color, Spectral and Contrast if necessary per protocol     Clinical Indication    Dyspnea   Dx: Shortness of breath [R06.02 (ICD-10-CM)]     Interpretation Summary    • The left ventricular cavity is mildly dilated.  • Left ventricular ejection fraction appears to be 36 - 40%. Left ventricular systolic function is moderately decreased.  • The following left ventricular wall segments are akinetic: apical anterior, apical lateral, apical inferior, apical septal, apex and mid anteroseptal.  • Left ventricular diastolic function is consistent with (grade I) impaired relaxation.  • Left atrial volume is mildly increased.  • Estimated right ventricular systolic pressure from tricuspid regurgitation is normal (<35 mmHg).  • There is a trivial pericardial effusion. The effusion is fluid filled. There is no evidence of cardiac tamponade.  • Compared to echo dated 2017 LVEF has dropped from 60% to 36-40% with new wall motion abnormalities            Results for orders placed during the hospital encounter of 23    Intra-Op TAVR Transesophageal Echo    Interpretation Summary  •  Left ventricular systolic function is moderately decreased.  •  The left ventricular cavity is dilated.  •  The left atrial cavity is dilated. No evidence of a left atrial appendage thrombus was present.  •  Mitral annular " calcification is present. Moderate mitral valve regurgitation is present with a centrally-directed jet noted     Conclusion of cardiac catheterization    8/5/2022     Normal left main coronary artery  Mid left anterior descending coronary artery subtotally occluded at the bifurcation site with a moderate sized second diagonal branch  After the subtotal occlusion in the mid left anterior descending coronary artery is fairly well visualized and is satisfactory for grafting ramus branch with approximately 50% stenosis in the ostial segment  No high-grade obstructive disease of the left circumflex coronary artery noted  Moderate atherosclerotic changes with some aneurysmal dilatation of the proximal right coronary artery  Midportion has approximately a 40% stenosis  No significant disease of the right posterior descending coronary artery  Subtotally occluded proximal moderate-sized right posterior lateral artery  Collaterals noted from the right coronary artery reconstituting in the distal and apical left anterior descending coronary artery  Patent bilateral internal mammary arteries that can be used as a surgical conduit  Mildly elevated left ventricular end-diastolic pressure 16 mmHg  Left ventriculogram not performed to save contrast due to stage IIIb renal failure        ____________________________________________________________________________________________________________________________________________     Plan after cardiac catheterization     Will refer for CT surgery evaluation and consideration for possible aortocoronary bypass surgery  May require work-up for viable hibernating myocardium  Given shortage of thallium may consider gadolinium enhanced cardiac MRI however given renal failure this may not be feasible  Will defer to surgery whether aortocoronary bypass surgery is feasible/advisable in the above scenario  Patient will be admitted for gentle hydration given renal failure  Usual post procedure  precautions after arteriotomy including monitoring for signs both local and systemic side effects.  On ultimate discharge will receive printed instructions  Intensive risk factor modifications for both primary and secondary prevention if applicable  Hydration   Observation             ____________________________________________________________________________________________________________________________________________  Health maintenance and recommendations    Low salt/ HTN/ Heart healthy carbohydrate restricted cardiac diet   The patient is advised to reduce or avoid caffeine or other cardiac stimulants.   Minimize or avoid  NSAID-type medications      Monitor for any signs of bleeding including red or dark stools. Fall precautions.   Advised staying uptodate with immunizations per established standard guidelines.    Offered to give patient  a copy of my notes     Questions were encouraged, asked and answered to the patient's  understanding and satisfaction. Questions if any regarding current medications and side effects, need for refills and importance of compliance to medications stressed.    Reviewed available prior notes, consults, prior visits, laboratory findings, radiology and cardiology relevant reports. Updated chart as applicable. I have reviewed the patient's medical history in detail and updated the computerized patient record as relevant.      Updated patient regarding any new or relevant abnormalities on review of records or any new findings on physical exam. Mentioned to patient about purpose of visit and desirable health short and long term goals and objectives.    Primary to monitor CBC CMP Lipid panel and TSH as applicable    ___________________________________________________________________________________________________________________________________________   Procedures    Assessment & Plan   Diagnoses and all orders for this visit:    1. Coronary artery disease involving coronary  bypass graft of native heart without angina pectoris (Primary)    2. Coronary artery disease involving native coronary artery of native heart without angina pectoris    3. PVC (premature ventricular contraction)    4. Shortness of breath  -     CBC & Differential; Future  -     Comprehensive Metabolic Panel; Future  -     Lipid Panel; Future  -     Adult Transthoracic Echo Limited W/ Cont if Necessary Per Protocol; Future    5. Hyperlipemia, mixed  -     Lipid Panel; Future          Plan    Patient expressed understanding  Encouraged and answered all questions   Discussed with the patient and all questioned fully answered. He will call me if any problems arise.   Discussed results of prior testing with patient : echo, cath and coronary artery bypass grafting     Monitor left ventricular ejection fraction by serial echo      Escalate therapy as tolerated     Orders Placed This Encounter   Procedures   • Comprehensive Metabolic Panel     Standing Status:   Future     Standing Expiration Date:   3/8/2024     Order Specific Question:   Release to patient     Answer:   Routine Release   • Lipid Panel     Standing Status:   Future     Standing Expiration Date:   3/8/2024     Order Specific Question:   Release to patient     Answer:   Routine Release   • Adult Transthoracic Echo Limited W/ Cont if Necessary Per Protocol     Standing Status:   Future     Standing Expiration Date:   3/7/2024     Scheduling Instructions:      Myocardial strain to be performed during echocardiogram as long as technically feasible     Order Specific Question:   Reason for exam?     Answer:   Dyspnea     Order Specific Question:   Release to patient     Answer:   Routine Release   • CBC & Differential     Standing Status:   Future     Standing Expiration Date:   3/8/2024     Order Specific Question:   Manual Differential     Answer:   No     Order Specific Question:   Release to patient     Answer:   Routine Release        Check BP and heart  rates twice daily initially till blood pressures and heart rates under good control and then at least 3x / week,   If blood pressures continue to be well-controlled then can check week a month  at home and bring a recording for review next visit  If BP >130/85 or < 100/60 persistently over 3 reading 30 mins apart or if heart rates persistently above 100 bpm or less than 55 bpm call sooner for evaluation and advise     Monitor for any signs of bleeding including red or dark stools as well as easy bruisabilty. Fall precautions.           Return in about 8 weeks (around 5/3/2023).

## 2023-03-15 ENCOUNTER — TELEPHONE (OUTPATIENT)
Dept: CARDIOLOGY | Facility: CLINIC | Age: 78
End: 2023-03-15
Payer: MEDICARE

## 2023-03-15 NOTE — TELEPHONE ENCOUNTER
Caller: Mitzy Philip    Relationship: Emergency Contact    Best call back number: [phone]    What is the best time to reach you: ANY    What was the call regarding: PT WIFE CALLED IN ABOUT BLOOD WORK DONE AT [facility] AND SEEING IF IT WAS FAXED OVER. DID CONFIRM, PT WOULD LIKE A CALL BACK ABOUT RESULTS PLEASE    Do you require a callback: YES

## 2023-03-21 NOTE — TELEPHONE ENCOUNTER
Caller: David Philip    Relationship: Emergency Contact    Best call back number: [phone]    What is the best time to reach you: ANYTIME    Who are you requesting to speak with (clinical staff, provider,  specific staff member): JIE    Do you know the name of the person who called: DAVID PHILIP    What was the call regarding: PATIENT'S EMERGENCY CONTACT WOULD LIKE A CALL BACK TO DISCUSS THE PATIENT'S LAB RESULTS IN FURTHER DETAIL.    Do you require a callback: YES

## 2023-03-27 ENCOUNTER — TELEPHONE (OUTPATIENT)
Dept: CARDIOLOGY | Facility: CLINIC | Age: 78
End: 2023-03-27
Payer: MEDICARE

## 2023-03-27 ENCOUNTER — TELEPHONE (OUTPATIENT)
Dept: CARDIOLOGY | Facility: CLINIC | Age: 78
End: 2023-03-27

## 2023-03-27 NOTE — TELEPHONE ENCOUNTER
Caller: Mitzy Philip    Relationship: Emergency Contact    Best call back number: [phone]  What is the best time to reach you: ANY    Who are you requesting to speak with (clinical staff, provider,  specific staff member): DR TORRE'S NURSE        What was the call regarding: PT WIFE CALLED AGAIN  ABOUT BLOOD WORK DONE AT [facility] AND SEEING IF IT WAS FAXED OVER. DID CONFIRM, PT WOULD LIKE A CALL BACK ABOUT RESULTS PLEASE    Do you require a callback: YES

## 2023-03-27 NOTE — TELEPHONE ENCOUNTER
PATIENTS WIFE HAS BEEN NOTIFIED THAT THERE ARE NO APPOINTMENTS AVAILABLE. I WILL SEND IT TO [facility] [place] [facility] JUST IN CASE A Wednesday OPENS UP.

## 2023-03-27 NOTE — TELEPHONE ENCOUNTER
Caller: Mitzy Philip    Relationship to patient: Emergency Contact    Best call back number: [phone]      Type of visit: 6 MO F/U    Requested date: PATIENT'S WIFE IS REQUESTING A Wednesday OR Friday APPOINTMENT    If rescheduling, when is the original appointment: THURS., 5/11/23 AT 10:45AM.    Additional notes:   PATIENT'S WIFE IS IS OFF Wednesday'S AND Friday'S  FROM WORK AND CAN BRING PATIENT.  SHE SAID OTHERWISE SHE WILL HAVE TO REQUEST TIME OFF FROM WORK.

## 2023-04-05 ENCOUNTER — HOSPITAL ENCOUNTER (OUTPATIENT)
Dept: CARDIOLOGY | Facility: HOSPITAL | Age: 78
Discharge: HOME | End: 2023-04-05
Admitting: INTERNAL MEDICINE
Payer: MEDICARE

## 2023-04-05 DIAGNOSIS — R06.02 SHORTNESS OF BREATH: ICD-10-CM

## 2023-04-05 PROCEDURE — 93308 TTE F-UP OR LMTD: CPT | Performed by: INTERNAL MEDICINE

## 2023-04-05 PROCEDURE — 93321 DOPPLER ECHO F-UP/LMTD STD: CPT | Performed by: INTERNAL MEDICINE

## 2023-04-05 PROCEDURE — 93325 DOPPLER ECHO COLOR FLOW MAPG: CPT

## 2023-04-05 PROCEDURE — 93321 DOPPLER ECHO F-UP/LMTD STD: CPT

## 2023-04-05 PROCEDURE — 25510000001 PERFLUTREN 6.52 MG/ML SUSPENSION: Performed by: INTERNAL MEDICINE

## 2023-04-05 PROCEDURE — 93325 DOPPLER ECHO COLOR FLOW MAPG: CPT | Performed by: INTERNAL MEDICINE

## 2023-04-05 PROCEDURE — 93356 MYOCRD STRAIN IMG SPCKL TRCK: CPT

## 2023-04-05 PROCEDURE — 93356 MYOCRD STRAIN IMG SPCKL TRCK: CPT | Performed by: INTERNAL MEDICINE

## 2023-04-05 PROCEDURE — 93308 TTE F-UP OR LMTD: CPT

## 2023-04-05 RX ADMIN — PERFLUTREN 9.78 MG: 6.52 INJECTION, SUSPENSION INTRAVENOUS at 11:45

## 2023-04-08 LAB
BH CV ECHO LEFT VENTRICLE GLOBAL LONGITUDINAL STRAIN: -8 %
BH CV ECHO MEAS - EDV(MOD-SP4): 120 ML
BH CV ECHO MEAS - EF(MOD-BP): 43.6 %
BH CV ECHO MEAS - EF(MOD-SP4): 43.6 %
BH CV ECHO MEAS - ESV(MOD-SP4): 67.7 ML
BH CV ECHO MEAS - LAT PEAK E' VEL: 9.3 CM/SEC
BH CV ECHO MEAS - LV DIASTOLIC VOL/BSA (35-75): 57.1 CM2
BH CV ECHO MEAS - LV SYSTOLIC VOL/BSA (12-30): 32.2 CM2
BH CV ECHO MEAS - MED PEAK E' VEL: 4.8 CM/SEC
BH CV ECHO MEAS - MV A MAX VEL: 66.9 CM/SEC
BH CV ECHO MEAS - MV DEC TIME: 0.45 MSEC
BH CV ECHO MEAS - MV E MAX VEL: 42.7 CM/SEC
BH CV ECHO MEAS - MV E/A: 0.64
BH CV ECHO MEAS - PA V2 MAX: 56.3 CM/SEC
BH CV ECHO MEAS - RAP SYSTOLE: 5 MMHG
BH CV ECHO MEAS - RVSP: 16.3 MMHG
BH CV ECHO MEAS - SI(MOD-SP4): 24.9 ML/M2
BH CV ECHO MEAS - SV(MOD-SP4): 52.3 ML
BH CV ECHO MEAS - TR MAX PG: 11.3 MMHG
BH CV ECHO MEAS - TR MAX VEL: 168 CM/SEC
BH CV ECHO MEASUREMENTS AVERAGE E/E' RATIO: 6.06
LEFT ATRIUM VOLUME INDEX: 21.5 ML/M2
LEFT ATRIUM VOLUME: 45.1 ML
MAXIMAL PREDICTED HEART RATE: 144 BPM
STRESS TARGET HR: 122 BPM

## 2023-04-10 ENCOUNTER — TELEPHONE (OUTPATIENT)
Dept: CARDIOLOGY | Facility: CLINIC | Age: 78
End: 2023-04-10
Payer: MEDICARE

## 2023-04-10 RX ORDER — CALCITRIOL 0.25 UG/1
CAPSULE, LIQUID FILLED ORAL
Qty: 30 CAPSULE | Refills: 0 | OUTPATIENT
Start: 2023-04-10

## 2023-04-10 RX ORDER — CALCITRIOL 0.25 UG/1
0.25 CAPSULE, LIQUID FILLED ORAL DAILY
Qty: 30 CAPSULE | Refills: 3 | OUTPATIENT
Start: 2023-04-10

## 2023-04-10 NOTE — TELEPHONE ENCOUNTER
Caller: DAVID    Relationship: WIFE     Best call back number: [phone]    What is the best time to reach you: ANY    Who are you requesting to speak with (clinical staff, provider,  specific staff member): ANY    Do you know the name of the person who called:     What was the call regarding: PATIENT'S WIFE CALLED IN TO SEE WHAT IS HAPPENING   WITH PATIENTS MEDICINE REFILL REQUEST. PLEASE ADVISE PATIENT'S WIFE AS TO WHY REFILL IS GETTING DENIED. PHARMACY IS TELLING THEM TO GET THE SCRIPT WITH KIDNEY DOCTOR BUT CARDIOLOGY IS WHERE THEY HAVE GOT IT IN THE PAST. PATIENT IS COMPLETELY OUT OF MEDICINE. PLEASE ADVISE. THANK YOU!    Do you require a callback: YES

## 2023-04-10 NOTE — TELEPHONE ENCOUNTER
Caller: CedrickMitzy    Relationship: Emergency Contact    Best call back number: [phone]    Requested Prescriptions:   Requested Prescriptions     Pending Prescriptions Disp Refills   • calcitriol (ROCALTROL) 0.25 MCG capsule 30 capsule 3     Sig: Take 1 capsule by mouth Daily.        Pharmacy where request should be sent: [facility] [address] [place] [address] [phone] [facility] [phone]      Last office visit with prescribing clinician: 3/8/2023   Last telemedicine visit with prescribing clinician: 5/11/2023   Next office visit with prescribing clinician: Visit date not found     Additional details provided by patient: NO PILLS REMAINING    Does the patient have less than a 3 day supply:  [x] Yes  [] No    Would you like a call back once the refill request has been completed: [x] Yes [] No      Adan Sanchez Rep   04/10/23 09:49 CDT

## 2023-04-10 NOTE — TELEPHONE ENCOUNTER
Caller: Mitzy Philip    Relationship: Emergency Contact    Best call back number: [phone]    What is the best time to reach you: ANY      What was the call regarding: PT'S WIFE WAS TRYING TO GET CALCITRIOL REFILLED BUT SHE STATED  [facility] SAID IT WAS DENIED DUE NEEDING TO CONTACT PT'S KIDNEY DOCTOR. SHE WAS CONFUSED ON THIS BECAUSE DR. TORRE WAS THE ON WHO PRESCRIBED IT. SHE DID NOT WANT TO CONTACT THEM STATING THAT IT SHOULD BE DR. TORRE'S RESPONSIBILITY.   SHE ALSO STATED THAT PT IS COMPLETELY OUT OF MEDICATION AND WAS UPSET THAT SHE DID NOT RECEIVE A CALLBACK WHEN THE PRESCRIPTION WS DENIED.   I ATTEMPTED TO WARM TRANSFER HER BUT SHE STATED SHE WAS AT WORK AND NEEDED TO BE CALLED BACK    Do you require a callback: YES

## 2023-04-11 NOTE — TELEPHONE ENCOUNTER
Call placed to patient and spouse. Patient and spouse given phone number to [facility] Kidney specialist to call and verify how patient should be taking calcitriol.

## 2023-04-19 RX ORDER — PANTOPRAZOLE SODIUM 40 MG/1
40 TABLET, DELAYED RELEASE ORAL
Qty: 30 TABLET | Refills: 3 | Status: SHIPPED | OUTPATIENT
Start: 2023-04-19

## 2023-04-19 NOTE — TELEPHONE ENCOUNTER
Caller: Mitzy Philip    Relationship: Emergency Contact    Best call back number: [phone]    Requested Prescriptions:   Requested Prescriptions     Pending Prescriptions Disp Refills   • pantoprazole (PROTONIX) 40 MG EC tablet 30 tablet 3     Sig: Take 1 tablet by mouth Every Morning.        Pharmacy where request should be sent: [facility] [address] [place] [address] [phone] [facility] [phone]      Last office visit with prescribing clinician: 3/8/2023   Last telemedicine visit with prescribing clinician: 5/11/2023   Next office visit with prescribing clinician: Visit date not found     Additional details provided by patient: PATIENT HAS ONE LEFT.    Does the patient have less than a 3 day supply:  [x] Yes  [] No      Adan Rosado Rep   04/19/23 10:36 CDT

## 2023-05-10 PROBLEM — Z95.1 S/P CABG X 1: Status: ACTIVE | Noted: 2023-05-10

## 2023-05-10 NOTE — PROGRESS NOTES
Chief Complaint  Coronary Artery Disease (8wk F/U. ) and Results (Echo/Lab)    Subjective          Gallo Philip presents to [id number] for routine follow-up of outpatient testing.  2D echo on 4/5/2023 revealed decreased left ventricular systolic function with  ejection fraction 43.6% (improved from 36 to 40% on 7/5/2022), abnormal global longitudinal LV strain of -8% and hypokinetic apical septal and apex wall segments.  He has chronic combined systolic and diastolic congestive heart failure, coronary artery disease status post CABG x1 (LIMA to LAD) 1/3/2023 per Dr. Keith Griffin at [facility], hypertension, hyperlipidemia, stage III chronic kidney disease, cerebrovascular accident, thoracic outlet syndrome and former obesity. Patient denies chest pain, shortness of breath, palpitations, dizziness, syncope, orthopnea, PND, edema or decreased stamina.  Patient denies any signs of bleeding.    Congestive Heart Failure  Presents for follow-up visit. Pertinent negatives include no abdominal pain, chest pain, chest pressure, claudication, edema, fatigue, muscle weakness, near-syncope, nocturia, orthopnea, palpitations, paroxysmal nocturnal dyspnea, shortness of breath or unexpected weight change. The symptoms have been stable. His past medical history is significant for CAD. Compliance with total regimen is 51-75%. Compliance with diet is 51-75%. Compliance with exercise is 51-75%. Compliance with medications is %.   Coronary Artery Disease  Presents for follow-up visit. Pertinent negatives include no chest pain, chest pressure, chest tightness, dizziness, leg swelling, muscle weakness, palpitations, shortness of breath or weight gain. Risk factors include hyperlipidemia. Risk factors do not include obesity. His past medical history is significant for CHF. The symptoms have been stable. Compliance with diet is variable. Compliance with exercise is variable.  Compliance with medications is good.   Hypertension  This is a chronic problem. The current episode started more than 1 year ago. The problem is controlled. Pertinent negatives include no anxiety, blurred vision, chest pain, headaches, malaise/fatigue, neck pain, orthopnea, palpitations, peripheral edema, PND, shortness of breath or sweats. Risk factors for coronary artery disease include male gender, dyslipidemia and obesity. Current antihypertension treatment includes calcium channel blockers, beta blockers and direct vasodilators. The current treatment provides significant improvement. Hypertensive end-organ damage includes kidney disease, CAD/MI, CVA and heart failure. Identifiable causes of hypertension include chronic renal disease.   Hyperlipidemia  This is a chronic problem. The current episode started more than 1 year ago. Exacerbating diseases include chronic renal disease. He has no history of obesity. Pertinent negatives include no chest pain or shortness of breath. Current antihyperlipidemic treatment includes statins. Risk factors for coronary artery disease include male sex, obesity, hypertension and dyslipidemia.     I have reviewed and confirmed the accuracy of the ROS  CAROL Perez      Objective     Current Outpatient Medications:   •  aspirin 81 MG tablet, Take 1 tablet by mouth Daily., Disp: , Rfl:   •  atorvastatin (LIPITOR) 80 MG tablet, Take 1 tablet by mouth Every Night., Disp: 90 tablet, Rfl: 3  •  calcitriol (ROCALTROL) 0.25 MCG capsule, Take 1 capsule by mouth Daily., Disp: 30 capsule, Rfl: 3  •  clopidogrel (PLAVIX) 75 MG tablet, Take 1 tablet by mouth Daily., Disp: , Rfl:   •  colchicine 0.6 MG tablet, Take 1 tablet by mouth Daily., Disp: , Rfl:   •  febuxostat (ULORIC) 40 MG tablet, Take 1 tablet by mouth Daily., Disp: , Rfl:   •  metoprolol succinate XL (TOPROL-XL) 25 MG 24 hr tablet, Take 1 tablet by mouth Daily., Disp: 90 tablet, Rfl: 3  •  pantoprazole (PROTONIX) 40 MG  "EC tablet, Take 1 tablet by mouth Every Morning., Disp: 30 tablet, Rfl: 3  •  sacubitril-valsartan (Entresto) 24-26 MG tablet, Take 1 tablet by mouth 2 (Two) Times a Day., Disp: 60 tablet, Rfl: 11  •  spironolactone (ALDACTONE) 25 MG tablet, Take 0.5 tablets by mouth Daily., Disp: 45 tablet, Rfl: 3  Vital Signs:   /76   Pulse 62   Ht 177.8 cm (70\")   Wt 88 kg (194 lb)   SpO2 98%   BMI 27.84 kg/m²     Vitals and nursing note reviewed.   Constitutional:       General: Awake.      Appearance: Normal and healthy appearance. Well-developed and not in distress. Obese.   Eyes:      General: Lids are normal.      Conjunctiva/sclera: Conjunctivae normal.      Pupils: Pupils are equal, round, and reactive to light.   HENT:      Head: Normocephalic and atraumatic.      Nose: Nose normal.   Neck:      Vascular: No JVR. JVD normal.   Pulmonary:      Effort: Pulmonary effort is normal.      Breath sounds: Normal breath sounds. No wheezing. No rhonchi. No rales.   Chest:      Chest wall: Not tender to palpatation.   Cardiovascular:      PMI at left midclavicular line. Normal rate. Regular rhythm. Normal S1. Normal S2.      Murmurs: There is no murmur.      No gallop. No click. No rub.   Pulses:     Intact distal pulses.   Edema:     Peripheral edema present.     Pretibial: bilateral trace edema of the pretibial area.     Ankle: bilateral trace edema of the ankle.     Feet: bilateral trace edema of the feet.  Abdominal:      General: Bowel sounds are normal.      Palpations: Abdomen is soft.      Tenderness: There is no abdominal tenderness.   Musculoskeletal: Normal range of motion.         General: No tenderness.      Cervical back: Normal range of motion. Skin:     General: Skin is warm and dry.   Neurological:      General: No focal deficit present.      Mental Status: Alert, oriented to person, place, and time and oriented to person, place and time.   Psychiatric:         Attention and Perception: Attention and " perception normal.         Mood and Affect: Mood and affect normal.         Speech: Speech normal.         Behavior: Behavior normal. Behavior is cooperative.         Thought Content: Thought content normal.         Cognition and Memory: Cognition and memory normal.         Judgment: Judgment normal.        Result Review :   The following data was reviewed by: CAROL Perez on 5/11/2023:  Common labs        1/7/2023    03:33 1/8/2023    03:12 1/16/2023    14:33   Common Labs   Glucose 111   113   128     BUN 25   27   29     Creatinine 1.55   1.47   1.51     Sodium 139   141   145     Potassium 3.8   3.6   4.2     Chloride 108   107   107     Calcium 8.9   8.5   9.2     WBC 8.98   9.36   17.99     Hemoglobin 8.9   8.6   13.0     Hematocrit 28.1   27.8   41.6     Platelets 205   257   789       Data reviewed: Cardiology studies 2d echo 7/5/22, lexiscan 7/5/22, holter monitor 7/5/22 and left heart catheterization 8/5/22          Assessment and Plan    Diagnoses and all orders for this visit:    1. Chronic combined systolic and diastolic heart failure (HCC) (Primary)- EF 43.6 % on 2D echo 4/5/2023. NYHA class II.  Stage C.  Compensated. Resume spironolactone at lower dose of 12.5 mg daily. BMP in one week. Reviewed signs and symptoms of CHF and what to report with the patient. Patient instructed to restrict sodium and weigh daily. Report weight gain of greater than 2 lbs overnight or 5 lbs in 1 week. Pt verbalized understanding of instructions and plan of care. Continue Entresto and metoprolol succinate. Consider up titration of Entresto and/or metoprolol in the future, if tolerated.  Patient has failed Jardiance in the past due to diarrhea and nausea.    2. Coronary artery disease involving native coronary artery of native heart without angina pectoris- Stable.  Continue Imdur.    3. Primary hypertension-blood pressure is well controlled. Continue metoprolol succinate, spironolactone and Entresto. Monitor  and record daily blood pressure. Report readings consistently higher than 130/80 or consistently lower than 100/60.     4. Hyperlipidemia LDL goal <70-continue atorvastatin.  Recheck lipid panel around 11/7/2022.    5. Stage 3b chronic kidney disease (HCC)- patient is following with nephrology outpatient.  Stable.  Continue to monitor following above medication adjustment.    6. S/P CABG x 1- LIMA to LAD 1/3/23 per Dr. Keith Griffin at [facility].     Follow Up   Return in about 3 months (around 8/11/2023) for Next scheduled follow up.  Patient was given instructions and counseling regarding his condition or for health maintenance advice. Please see specific information pulled into the AVS if appropriate.

## 2023-05-11 ENCOUNTER — OFFICE VISIT (OUTPATIENT)
Dept: CARDIOLOGY | Facility: CLINIC | Age: 78
End: 2023-05-11
Payer: MEDICARE

## 2023-05-11 VITALS
BODY MASS INDEX: 27.77 KG/M2 | HEART RATE: 62 BPM | OXYGEN SATURATION: 98 % | HEIGHT: 70 IN | SYSTOLIC BLOOD PRESSURE: 122 MMHG | DIASTOLIC BLOOD PRESSURE: 76 MMHG | WEIGHT: 194 LBS

## 2023-05-11 DIAGNOSIS — N18.32 STAGE 3B CHRONIC KIDNEY DISEASE: ICD-10-CM

## 2023-05-11 DIAGNOSIS — I25.10 CORONARY ARTERY DISEASE INVOLVING NATIVE CORONARY ARTERY OF NATIVE HEART WITHOUT ANGINA PECTORIS: ICD-10-CM

## 2023-05-11 DIAGNOSIS — I10 PRIMARY HYPERTENSION: ICD-10-CM

## 2023-05-11 DIAGNOSIS — Z95.1 S/P CABG X 1: ICD-10-CM

## 2023-05-11 DIAGNOSIS — E78.5 HYPERLIPIDEMIA LDL GOAL <70: ICD-10-CM

## 2023-05-11 DIAGNOSIS — I50.42 CHRONIC COMBINED SYSTOLIC AND DIASTOLIC HEART FAILURE: Primary | ICD-10-CM

## 2023-05-11 RX ORDER — SPIRONOLACTONE 25 MG/1
12.5 TABLET ORAL DAILY
Qty: 45 TABLET | Refills: 3 | Status: SHIPPED | OUTPATIENT
Start: 2023-05-11

## 2023-07-04 NOTE — TELEPHONE ENCOUNTER
Caller: Mitzy Philip    Relationship: Emergency Contact    Best call back number: [phone]    Requested Prescriptions:   Requested Prescriptions     Pending Prescriptions Disp Refills   • calcitriol (ROCALTROL) 0.25 MCG capsule 30 capsule 3     Sig: Take 1 capsule by mouth Daily.        Pharmacy where request should be sent: [facility] [address] [place] [address] [phone] [facility] [phone] FX     Additional details provided by patient: PT AND PT WIFE REPORT MEDICINE WAS PRESCRIBED WHILE PT WAS IN HOSPITAL AND HE HAS ENOUGH FOR TONIGHT BUT THAT'S IT - THEY ARENT SURE IF HE SHOULD CONTINUE WITH MEDICINE AND IF SO, HE WILL NEED REFILLS CALLED IN    Does the patient have less than a 3 day supply:  [x] Yes  [] No    Would you like a call back once the refill request has been completed: [x] Yes [] No    If the office needs to give you a call back, can they leave a voicemail: [x] Yes [] No    Adan Lester Rep   12/05/22 09:49 CST       
None

## 2023-08-03 RX ORDER — METOPROLOL SUCCINATE 25 MG/1
TABLET, EXTENDED RELEASE ORAL
Qty: 90 TABLET | Refills: 3 | Status: SHIPPED | OUTPATIENT
Start: 2023-08-03

## 2023-08-11 ENCOUNTER — TELEPHONE (OUTPATIENT)
Dept: CARDIOLOGY | Facility: CLINIC | Age: 78
End: 2023-08-11
Payer: MEDICARE

## 2023-08-11 NOTE — TELEPHONE ENCOUNTER
Caller: DAVID WILLIS    Relationship: WIFE    Best call back number: [phone]    What is the best time to reach you: ANY    Who are you requesting to speak with (clinical staff, provider,  specific staff member): CLINICAL    Do you know the name of the person who called: N/A    What was the call regarding: PLS SEND LABWORK ORDERS TO [facility]. HE WILL GO MONDAY MORNING FOR DRAWS.     Is it okay if the provider responds through MyChart: PLS CALL IF NEEDED

## 2023-08-15 NOTE — PROGRESS NOTES
Chief Complaint  Congestive Heart Failure (3mo F/U. Started Aldactone LOV. States stopped after 1 month due to feeling bad. ) and Results (Lab)    Subjective          Gallo Philip presents to [facility] for routine follow-up of medication adjustment.  Spironolactone was resumed at lower dose of 12.5 mg daily at his last office visit on 5/11/2023.  Follow-up BMP on 5/19/2023 revealed normal potassium and stable creatinine of 1.6. However, pt has since discontinued spironolactone due to complaints of increased fatigue. He has chronic combined systolic and diastolic congestive heart failure, coronary artery disease status post CABG x1 (LIMA to LAD) 1/3/2023 per Dr. Keith Griffin at [facility], hypertension, hyperlipidemia, stage III chronic kidney disease, cerebrovascular accident, thoracic outlet syndrome and former obesity. Patient denies chest pain, shortness of breath, palpitations, dizziness, syncope, orthopnea, PND, edema or decreased stamina.  Patient denies any signs of bleeding.    Congestive Heart Failure  Presents for follow-up visit. Pertinent negatives include no abdominal pain, chest pain, chest pressure, claudication, edema, fatigue, muscle weakness, near-syncope, nocturia, orthopnea, palpitations, paroxysmal nocturnal dyspnea, shortness of breath or unexpected weight change. The symptoms have been stable. His past medical history is significant for CAD. Compliance with total regimen is 51-75%. Compliance with diet is 51-75%. Compliance with exercise is 51-75%. Compliance with medications is %.   Coronary Artery Disease  Presents for follow-up visit. Pertinent negatives include no chest pain, chest pressure, chest tightness, dizziness, leg swelling, muscle weakness, palpitations, shortness of breath or weight gain. Risk factors include hyperlipidemia. Risk factors do not include obesity. His past medical history is significant for CHF. The symptoms have  been stable. Compliance with diet is variable. Compliance with exercise is variable. Compliance with medications is good.   Hypertension  This is a chronic problem. The current episode started more than 1 year ago. The problem is controlled. Pertinent negatives include no anxiety, blurred vision, chest pain, headaches, malaise/fatigue, neck pain, orthopnea, palpitations, peripheral edema, PND, shortness of breath or sweats. Risk factors for coronary artery disease include male gender, dyslipidemia and obesity. Current antihypertension treatment includes calcium channel blockers, beta blockers and direct vasodilators. The current treatment provides significant improvement. Hypertensive end-organ damage includes kidney disease, CAD/MI, CVA and heart failure. Identifiable causes of hypertension include chronic renal disease.   Hyperlipidemia  This is a chronic problem. The current episode started more than 1 year ago. Exacerbating diseases include chronic renal disease. He has no history of obesity. Pertinent negatives include no chest pain or shortness of breath. Current antihyperlipidemic treatment includes statins. Risk factors for coronary artery disease include male sex, obesity, hypertension and dyslipidemia.     I have reviewed and confirmed the accuracy of the ROS CAROL Perez      Objective     Current Outpatient Medications:     aspirin 81 MG tablet, Take 1 tablet by mouth Daily., Disp: , Rfl:     atorvastatin (LIPITOR) 80 MG tablet, Take 1 tablet by mouth Every Night., Disp: 90 tablet, Rfl: 3    calcitriol (ROCALTROL) 0.25 MCG capsule, Take 1 capsule by mouth Daily., Disp: 30 capsule, Rfl: 3    clopidogrel (PLAVIX) 75 MG tablet, Take 1 tablet by mouth Daily., Disp: , Rfl:     colchicine 0.6 MG tablet, Take 1 tablet by mouth Daily., Disp: , Rfl:     febuxostat (ULORIC) 40 MG tablet, Take 1 tablet by mouth Daily., Disp: , Rfl:     metoprolol succinate XL (TOPROL-XL) 25 MG 24 hr tablet, Take 1 tablet  "by mouth once daily, Disp: 90 tablet, Rfl: 3    pantoprazole (PROTONIX) 40 MG EC tablet, Take 1 tablet by mouth Every Morning., Disp: 90 tablet, Rfl: 3    sacubitril-valsartan (Entresto) 24-26 MG tablet, Take 1 tablet by mouth 2 (Two) Times a Day., Disp: 60 tablet, Rfl: 11  Vital Signs:   /75   Pulse 61   Ht 177.8 cm (70\")   Wt 89.8 kg (198 lb)   SpO2 97%   BMI 28.41 kg/mý     Vitals and nursing note reviewed.   Constitutional:       General: Awake.      Appearance: Normal and healthy appearance. Well-developed and not in distress. Obese.   Eyes:      General: Lids are normal.      Conjunctiva/sclera: Conjunctivae normal.      Pupils: Pupils are equal, round, and reactive to light.   HENT:      Head: Normocephalic and atraumatic.      Nose: Nose normal.   Neck:      Vascular: No JVR. JVD normal.   Pulmonary:      Effort: Pulmonary effort is normal.      Breath sounds: Normal breath sounds. No decreased breath sounds. No wheezing. No rhonchi. No rales.   Chest:      Chest wall: Not tender to palpatation.   Cardiovascular:      PMI at left midclavicular line. Normal rate. Regular rhythm. Normal S1. Normal S2.       Murmurs: There is no murmur.      No gallop.  No click. No rub.   Pulses:     Intact distal pulses.   Edema:     Peripheral edema present.     Pretibial: bilateral trace edema of the pretibial area.     Ankle: bilateral trace edema of the ankle.     Feet: bilateral trace edema of the feet.  Abdominal:      General: Bowel sounds are normal.      Palpations: Abdomen is soft.      Tenderness: There is no abdominal tenderness.   Musculoskeletal: Normal range of motion.         General: No tenderness.      Cervical back: Normal range of motion. Skin:     General: Skin is warm and dry.   Neurological:      General: No focal deficit present.      Mental Status: Alert, oriented to person, place, and time and oriented to person, place and time.   Psychiatric:         Attention and Perception: Attention " and perception normal.         Mood and Affect: Mood and affect normal.         Speech: Speech normal.         Behavior: Behavior normal. Behavior is cooperative.         Thought Content: Thought content normal.         Cognition and Memory: Cognition and memory normal.         Judgment: Judgment normal.      Result Review :   The following data was reviewed by: CAROL Perez on 08/16/2023:  Common labs          1/7/2023    03:33 1/8/2023    03:12 1/16/2023    14:33   Common Labs   Glucose 111  113  128    BUN 25  27  29    Creatinine 1.55  1.47  1.51    Sodium 139  141  145    Potassium 3.8  3.6  4.2    Chloride 108  107  107    Calcium 8.9  8.5  9.2    WBC 8.98  9.36  17.99    Hemoglobin 8.9  8.6  13.0    Hematocrit 28.1  27.8  41.6    Platelets 205  257  789      Data reviewed: Cardiology studies 2d echo 7/5/22, lexiscan 7/5/22, holter monitor 7/5/22 and left heart catheterization 8/5/22      ECG 12 Lead    Date/Time: 8/16/2023 10:24 AM  Performed by: Toya Ramey APRN  Authorized by: Toya Ramey APRN   Comparison: compared with previous ECG from 1/5/2023  Similar to previous ECG  Rhythm: sinus rhythm  Rate: normal  BPM: 61  Conduction: left bundle branch block and 1st degree AV block  QRS axis: right    Clinical impression: abnormal EKG          Assessment and Plan    Diagnoses and all orders for this visit:    1. Chronic combined systolic and diastolic heart failure (HCC) (Primary)- EF 43.6 % on 2D echo 4/5/2023. NYHA class II.  Stage C.  Compensated. Reviewed signs and symptoms of CHF and what to report with the patient. Patient instructed to restrict sodium and weigh daily. Report weight gain of greater than 2 lbs overnight or 5 lbs in 1 week. Pt verbalized understanding of instructions and plan of care. Continue Entresto and metoprolol succinate. Consider up titration of Entresto and/or metoprolol in the future, if tolerated.  Patient has failed Jardiance in the past due to diarrhea  and nausea. He has failed spironolactone due to increased fatigue.     2. Coronary artery disease involving native coronary artery of native heart without angina pectoris- Stable.  Continue Imdur.    3. Primary hypertension-blood pressure is well controlled. Continue metoprolol succinate and Entresto. Monitor and record daily blood pressure. Report readings consistently higher than 130/80 or consistently lower than 100/60.     4. Hyperlipidemia LDL goal <70-continue atorvastatin.  Recheck lipid panel around 11/7/2022.    5. Stage 3b chronic kidney disease (HCC)- patient is following with nephrology outpatient.  Stable.  Continue to monitor following above medication adjustment.    6. S/P CABG x 1- LIMA to LAD 1/3/23 per Dr. Keith Griffin at [facility].     Follow Up   Return in about 3 months (around 11/16/2023) for Next scheduled follow up.  Patient was given instructions and counseling regarding his condition or for health maintenance advice. Please see specific information pulled into the AVS if appropriate.

## 2023-08-16 ENCOUNTER — OFFICE VISIT (OUTPATIENT)
Dept: CARDIOLOGY | Facility: CLINIC | Age: 78
End: 2023-08-16
Payer: MEDICARE

## 2023-08-16 VITALS
DIASTOLIC BLOOD PRESSURE: 75 MMHG | WEIGHT: 198 LBS | SYSTOLIC BLOOD PRESSURE: 127 MMHG | HEART RATE: 61 BPM | HEIGHT: 70 IN | OXYGEN SATURATION: 97 % | BODY MASS INDEX: 28.35 KG/M2

## 2023-08-16 DIAGNOSIS — Z95.1 S/P CABG X 1: ICD-10-CM

## 2023-08-16 DIAGNOSIS — I10 PRIMARY HYPERTENSION: ICD-10-CM

## 2023-08-16 DIAGNOSIS — I25.10 CORONARY ARTERY DISEASE INVOLVING NATIVE CORONARY ARTERY OF NATIVE HEART WITHOUT ANGINA PECTORIS: ICD-10-CM

## 2023-08-16 DIAGNOSIS — I50.42 CHRONIC COMBINED SYSTOLIC AND DIASTOLIC HEART FAILURE: Primary | ICD-10-CM

## 2023-08-16 DIAGNOSIS — N18.32 STAGE 3B CHRONIC KIDNEY DISEASE: ICD-10-CM

## 2023-08-16 DIAGNOSIS — E78.5 HYPERLIPIDEMIA LDL GOAL <70: ICD-10-CM

## 2023-08-16 RX ORDER — PANTOPRAZOLE SODIUM 40 MG/1
40 TABLET, DELAYED RELEASE ORAL EVERY MORNING
Qty: 90 TABLET | Refills: 3 | Status: SHIPPED | OUTPATIENT
Start: 2023-08-16

## 2023-08-21 RX ORDER — SACUBITRIL AND VALSARTAN 24; 26 MG/1; MG/1
1 TABLET, FILM COATED ORAL 2 TIMES DAILY
Qty: 60 TABLET | Refills: 11 | Status: SHIPPED | OUTPATIENT
Start: 2023-08-21

## 2023-08-21 NOTE — TELEPHONE ENCOUNTER
Caller: Mitzy Philip    Relationship: Emergency Contact    Best call back number: [phone]    Requested Prescriptions:   Requested Prescriptions     Pending Prescriptions Disp Refills    sacubitril-valsartan (Entresto) 24-26 MG tablet 60 tablet 11     Sig: Take 1 tablet by mouth 2 (Two) Times a Day.        Pharmacy where request should be sent: [facility] [address] [place] [address] [phone] [facility] [phone]      Last office visit with prescribing clinician: 8/16/2023   Last telemedicine visit with prescribing clinician: Visit date not found   Next office visit with prescribing clinician: 11/8/2023     Additional details provided by patient: 1 DAY REMAINING     Does the patient have less than a 3 day supply:  [x] Yes  [] No    Would you like a call back once the refill request has been completed: [x] Yes [] No    If the office needs to give you a call back, can they leave a voicemail: [x] Yes [] No    Adan Ambrosio Rep   08/21/23 12:10 CDT

## 2023-08-25 ENCOUNTER — TELEPHONE (OUTPATIENT)
Dept: CARDIOLOGY | Facility: CLINIC | Age: 78
End: 2023-08-25

## 2023-08-25 NOTE — TELEPHONE ENCOUNTER
Caller: DAVID     Relationship: WIFE     Best call back number: [phone]    What medications are you currently taking:   Current Outpatient Medications on File Prior to Visit   Medication Sig Dispense Refill    aspirin 81 MG tablet Take 1 tablet by mouth Daily.      atorvastatin (LIPITOR) 80 MG tablet Take 1 tablet by mouth Every Night. 90 tablet 3    calcitriol (ROCALTROL) 0.25 MCG capsule Take 1 capsule by mouth Daily. 30 capsule 3    clopidogrel (PLAVIX) 75 MG tablet Take 1 tablet by mouth Daily.      colchicine 0.6 MG tablet Take 1 tablet by mouth Daily.      febuxostat (ULORIC) 40 MG tablet Take 1 tablet by mouth Daily.      metoprolol succinate XL (TOPROL-XL) 25 MG 24 hr tablet Take 1 tablet by mouth once daily 90 tablet 3    pantoprazole (PROTONIX) 40 MG EC tablet Take 1 tablet by mouth Every Morning. 90 tablet 3    sacubitril-valsartan (Entresto) 24-26 MG tablet Take 1 tablet by mouth 2 (Two) Times a Day. 60 tablet 11     No current facility-administered medications on file prior to visit.          When did you start taking these medications: A YEAR AGO    Which medication are you concerned about:   sacubitril-valsartan (Entresto) 24-26 MG tablet Take 1 tablet by mouth 2 (Two) Times a Day.       Who prescribed you this medication: ADAM HANNAH    What are your concerns: PATIENT'S INSURANCE WAS $45 A MONTH FOR ENTRESTO AND NOW PATIENT IS PAYING $186 FOR 30 DAYS. PLEASE ADVISE IF SAMPLES OR A PROGRAM IS AVAILABLE FOR HELP WITH MEDICINE COST. THANK YOU!    How long have you had these concerns:

## 2023-08-25 NOTE — TELEPHONE ENCOUNTER
Spoke with patients wife. Samples will be at  for p/u. Patient assistance form was mailed out per her request.

## 2023-11-07 NOTE — PROGRESS NOTES
Chief Complaint  Congestive Heart Failure (3mo F/U), Coronary Artery Disease, and Hypertension    Subjective          Gallo Philip presents to [facility] for routine follow-up. He has chronic combined systolic and diastolic congestive heart failure, coronary artery disease status post CABG x1 (LIMA to LAD) 1/3/2023 per Dr. Keith Griffin at [facility], hypertension, hyperlipidemia, stage III chronic kidney disease, cerebrovascular accident, thoracic outlet syndrome and former obesity. Patient denies chest pain, shortness of breath, palpitations, dizziness, syncope, orthopnea, PND, edema or decreased stamina.  Patient denies any signs of bleeding.    Congestive Heart Failure  Presents for follow-up visit. Pertinent negatives include no abdominal pain, chest pain, chest pressure, claudication, edema, fatigue, muscle weakness, near-syncope, nocturia, orthopnea, palpitations, paroxysmal nocturnal dyspnea, shortness of breath or unexpected weight change. The symptoms have been stable. His past medical history is significant for CAD. Compliance with total regimen is 51-75%. Compliance with diet is 51-75%. Compliance with exercise is 51-75%. Compliance with medications is %.   Coronary Artery Disease  Presents for follow-up visit. Pertinent negatives include no chest pain, chest pressure, chest tightness, dizziness, leg swelling, muscle weakness, palpitations, shortness of breath or weight gain. Risk factors include hyperlipidemia. Risk factors do not include obesity. His past medical history is significant for CHF. The symptoms have been stable. Compliance with diet is variable. Compliance with exercise is variable. Compliance with medications is good.   Hypertension  This is a chronic problem. The current episode started more than 1 year ago. The problem is controlled. Pertinent negatives include no anxiety, blurred vision, chest pain, headaches, malaise/fatigue, neck pain,  "orthopnea, palpitations, peripheral edema, PND, shortness of breath or sweats. Risk factors for coronary artery disease include male gender, dyslipidemia and obesity. Current antihypertension treatment includes calcium channel blockers, beta blockers and direct vasodilators. The current treatment provides significant improvement. Hypertensive end-organ damage includes kidney disease, CAD/MI, CVA and heart failure. Identifiable causes of hypertension include chronic renal disease.   Hyperlipidemia  This is a chronic problem. The current episode started more than 1 year ago. Exacerbating diseases include chronic renal disease. He has no history of obesity. Pertinent negatives include no chest pain or shortness of breath. Current antihyperlipidemic treatment includes statins. Risk factors for coronary artery disease include male sex, obesity, hypertension and dyslipidemia.       I have reviewed and confirmed the accuracy of the ROS CAROL Perze        Objective     Current Outpatient Medications:     aspirin 81 MG tablet, Take 1 tablet by mouth Daily., Disp: , Rfl:     atorvastatin (LIPITOR) 80 MG tablet, Take 1 tablet by mouth Every Night., Disp: 90 tablet, Rfl: 3    calcitriol (ROCALTROL) 0.25 MCG capsule, Take 1 capsule by mouth Daily., Disp: 30 capsule, Rfl: 3    clopidogrel (PLAVIX) 75 MG tablet, Take 1 tablet by mouth Daily., Disp: , Rfl:     colchicine 0.6 MG tablet, Take 1 tablet by mouth Daily., Disp: , Rfl:     febuxostat (ULORIC) 40 MG tablet, Take 1 tablet by mouth Daily., Disp: , Rfl:     metoprolol succinate XL (TOPROL-XL) 25 MG 24 hr tablet, Take 1 tablet by mouth once daily, Disp: 90 tablet, Rfl: 3    pantoprazole (PROTONIX) 40 MG EC tablet, Take 1 tablet by mouth Every Morning., Disp: 90 tablet, Rfl: 3    sacubitril-valsartan (Entresto) 24-26 MG tablet, Take 1 tablet by mouth 2 (Two) Times a Day., Disp: 60 tablet, Rfl: 11  Vital Signs:   /76   Pulse 61   Ht 177.8 cm (70\")   Wt 91.6 " kg (202 lb)   SpO2 99%   BMI 28.98 kg/m²     Vitals and nursing note reviewed.   Constitutional:       General: Awake.      Appearance: Normal and healthy appearance. Well-developed and not in distress. Obese.   Eyes:      General: Lids are normal.      Conjunctiva/sclera: Conjunctivae normal.      Pupils: Pupils are equal, round, and reactive to light.   HENT:      Head: Normocephalic and atraumatic.      Nose: Nose normal.   Neck:      Vascular: No JVR. JVD normal.   Pulmonary:      Effort: Pulmonary effort is normal.      Breath sounds: Normal breath sounds. No decreased breath sounds. No wheezing. No rhonchi. No rales.   Chest:      Chest wall: Not tender to palpatation.   Cardiovascular:      PMI at left midclavicular line. Normal rate. Regular rhythm. Normal S1. Normal S2.       Murmurs: There is no murmur.      No gallop.  No click. No rub.   Pulses:     Intact distal pulses.   Edema:     Peripheral edema present.     Pretibial: bilateral trace edema of the pretibial area.     Ankle: bilateral trace edema of the ankle.     Feet: bilateral trace edema of the feet.  Abdominal:      General: Bowel sounds are normal.      Palpations: Abdomen is soft.      Tenderness: There is no abdominal tenderness.   Musculoskeletal: Normal range of motion.         General: No tenderness.      Cervical back: Normal range of motion. Skin:     General: Skin is warm and dry.   Neurological:      General: No focal deficit present.      Mental Status: Alert, oriented to person, place, and time and oriented to person, place and time.   Psychiatric:         Attention and Perception: Attention and perception normal.         Mood and Affect: Mood and affect normal.         Speech: Speech normal.         Behavior: Behavior normal. Behavior is cooperative.         Thought Content: Thought content normal.         Cognition and Memory: Cognition and memory normal.         Judgment: Judgment normal.        Result Review :   The following  data was reviewed by: CAROL Perze on 11/08/2023:  Common labs          1/7/2023    03:33 1/8/2023    03:12 1/16/2023    14:33   Common Labs   Glucose 111  113  128    BUN 25  27  29    Creatinine 1.55  1.47  1.51    Sodium 139  141  145    Potassium 3.8  3.6  4.2    Chloride 108  107  107    Calcium 8.9  8.5  9.2    WBC 8.98  9.36  17.99    Hemoglobin 8.9  8.6  13.0    Hematocrit 28.1  27.8  41.6    Platelets 205  257  789      Data reviewed: Cardiology studies 2d echo 7/5/22, lexiscan 7/5/22, holter monitor 7/5/22 and left heart catheterization 8/5/22           Assessment and Plan    Diagnoses and all orders for this visit:    1. Chronic combined systolic and diastolic heart failure (HCC) (Primary)- EF 43.6 % on 2D echo 4/5/2023. NYHA class II.  Stage C.  Compensated. Reviewed signs and symptoms of CHF and what to report with the patient. Patient instructed to restrict sodium and weigh daily. Report weight gain of greater than 2 lbs overnight or 5 lbs in 1 week. Pt verbalized understanding of instructions and plan of care. Continue Entresto and metoprolol succinate. Consider up titration of Entresto and/or metoprolol in the future, if tolerated.  Patient has failed Jardiance in the past due to diarrhea and nausea. He has failed spironolactone due to increased fatigue.     2. Coronary artery disease involving native coronary artery of native heart without angina pectoris- Stable.  Continue Imdur.    3. Primary hypertension-blood pressure is mildly elevated in office today, however pt reports well controlled at home with readings consistently lower than 130/80. Continue metoprolol succinate and Entresto. Monitor and record daily blood pressure. Report readings consistently higher than 130/80 or consistently lower than 100/60.     4. Hyperlipidemia LDL goal <70-continue atorvastatin.  Recheck lipid panel around 11/7/2022.    5. Stage 3b chronic kidney disease (HCC)- patient is following with nephrology  outpatient.  Stable.  Continue to monitor following above medication adjustment.    6. S/P CABG x 1- LIMA to LAD 1/3/23 per Dr. Keith Griffin at [facility].     Follow Up   Return in about 3 months (around 2/8/2024) for Next scheduled follow up.  Patient was given instructions and counseling regarding his condition or for health maintenance advice. Please see specific information pulled into the AVS if appropriate.

## 2023-11-08 ENCOUNTER — OFFICE VISIT (OUTPATIENT)
Dept: CARDIOLOGY | Facility: CLINIC | Age: 78
End: 2023-11-08
Payer: MEDICARE

## 2023-11-08 VITALS
BODY MASS INDEX: 28.92 KG/M2 | HEIGHT: 70 IN | WEIGHT: 202 LBS | OXYGEN SATURATION: 99 % | HEART RATE: 61 BPM | SYSTOLIC BLOOD PRESSURE: 135 MMHG | DIASTOLIC BLOOD PRESSURE: 76 MMHG

## 2023-11-08 DIAGNOSIS — Z95.1 S/P CABG X 1: ICD-10-CM

## 2023-11-08 DIAGNOSIS — I50.42 CHRONIC COMBINED SYSTOLIC AND DIASTOLIC HEART FAILURE: Primary | ICD-10-CM

## 2023-11-08 DIAGNOSIS — N18.32 STAGE 3B CHRONIC KIDNEY DISEASE: ICD-10-CM

## 2023-11-08 DIAGNOSIS — I10 PRIMARY HYPERTENSION: ICD-10-CM

## 2023-11-08 DIAGNOSIS — E78.5 HYPERLIPIDEMIA LDL GOAL <70: ICD-10-CM

## 2023-11-08 DIAGNOSIS — I25.10 CORONARY ARTERY DISEASE INVOLVING NATIVE CORONARY ARTERY OF NATIVE HEART WITHOUT ANGINA PECTORIS: ICD-10-CM

## 2023-12-18 RX ORDER — ATORVASTATIN CALCIUM 80 MG/1
80 TABLET, FILM COATED ORAL NIGHTLY
Qty: 90 TABLET | Refills: 3 | Status: SHIPPED | OUTPATIENT
Start: 2023-12-18

## 2023-12-18 NOTE — TELEPHONE ENCOUNTER
Caller: Mitzy Philip    Relationship: Emergency Contact    Best call back number: [phone]    Requested Prescriptions:   Requested Prescriptions     Pending Prescriptions Disp Refills    atorvastatin (LIPITOR) 80 MG tablet 90 tablet 3     Sig: Take 1 tablet by mouth Every Night.        Pharmacy where request should be sent: [facility] [address] [place] [address] [phone] [facility] [phone]      Last office visit with prescribing clinician: 3/8/2023   Last telemedicine visit with prescribing clinician: Visit date not found   Next office visit with prescribing clinician: Visit date not found     Additional details provided by patient: HAS ONE PILL LEFT    Does the patient have less than a 3 day supply:  [x] Yes  [] No    Would you like a call back once the refill request has been completed: [] Yes [x] No    If the office needs to give you a call back, can they leave a voicemail: [] Yes [x] No    Adan James Rep   12/18/23 11:22 CST

## 2024-01-18 ENCOUNTER — TELEPHONE (OUTPATIENT)
Dept: CARDIOLOGY | Facility: CLINIC | Age: 79
End: 2024-01-18
Payer: MEDICARE

## 2024-01-18 NOTE — TELEPHONE ENCOUNTER
*OK for HUB to read and get answer*    [facility]. Patient insurance in our system is an [employer] supplement. Call placed to patient to see what insurance he has to do a prior authorization.

## 2024-02-14 ENCOUNTER — OFFICE VISIT (OUTPATIENT)
Dept: CARDIOLOGY | Facility: CLINIC | Age: 79
End: 2024-02-14
Payer: MEDICARE

## 2024-02-14 VITALS
OXYGEN SATURATION: 97 % | HEIGHT: 70 IN | DIASTOLIC BLOOD PRESSURE: 76 MMHG | BODY MASS INDEX: 29.49 KG/M2 | WEIGHT: 206 LBS | SYSTOLIC BLOOD PRESSURE: 116 MMHG | HEART RATE: 65 BPM

## 2024-02-14 DIAGNOSIS — Z95.1 S/P CABG X 1: ICD-10-CM

## 2024-02-14 DIAGNOSIS — I10 PRIMARY HYPERTENSION: ICD-10-CM

## 2024-02-14 DIAGNOSIS — E78.5 HYPERLIPIDEMIA LDL GOAL <70: ICD-10-CM

## 2024-02-14 DIAGNOSIS — I25.10 CORONARY ARTERY DISEASE INVOLVING NATIVE CORONARY ARTERY OF NATIVE HEART WITHOUT ANGINA PECTORIS: ICD-10-CM

## 2024-02-14 DIAGNOSIS — I50.42 CHRONIC COMBINED SYSTOLIC AND DIASTOLIC HEART FAILURE: Primary | ICD-10-CM

## 2024-02-14 DIAGNOSIS — N18.32 STAGE 3B CHRONIC KIDNEY DISEASE: ICD-10-CM

## 2024-02-23 ENCOUNTER — TELEPHONE (OUTPATIENT)
Dept: CARDIOLOGY | Facility: CLINIC | Age: 79
End: 2024-02-23
Payer: MEDICARE

## 2024-02-23 NOTE — TELEPHONE ENCOUNTER
Caller: David Philip    Relationship: Emergency Contact    Best call back number: [phone]     What is the best time to reach you: ANYTIME    Who are you requesting to speak with (clinical staff, provider,  specific staff member): CLINICAL    Do you know the name of the person who called: DAVID    What was the call regarding: PT IS SUPPOSED TO HAVE A DENTAL CLEANING ON THURSDAY ANDS THEY WANTED TO KNOW IF THE PT NEEDED TO TAKE AN ANTIBIOTIC BEFOREHAND    CALL PRESCRIPTION INTO [facility] ON [address] IN [place] IF IT IS NEEDED.    Is it okay if the provider responds through MyChart: NO

## 2024-03-13 ENCOUNTER — TELEPHONE (OUTPATIENT)
Dept: CARDIOLOGY | Facility: CLINIC | Age: 79
End: 2024-03-13

## 2024-03-13 NOTE — TELEPHONE ENCOUNTER
Spoke with patients wife. BP has not been running low. BP has been 107/60's. He has just had dizziness on occasions. They are going to continue treatment for fluid in ears that ENT has provided and will call us if anything changes.

## 2024-03-13 NOTE — TELEPHONE ENCOUNTER
Caller: DAVID    Relationship to patient: WIFE    Best call back number: [phone]    Chief complaint: DIZZINESS    Type of visit: FOLLOW UP    Requested date:     If rescheduling, when is the original appointment:     Additional notes: PATIENT GETTING DIZZY AT TIMES - ENT IS TREATING PATIENT FOR FLUID IN THE EARS AND PATIENT WONDERING IF THEY NEED TO BE SEEN OR IF SOMETHING CAN BE DONE FROM HOME. PLEASE CALL PATIENT TO ADVISE. THANK YOU!

## 2024-05-15 ENCOUNTER — OFFICE VISIT (OUTPATIENT)
Dept: CARDIOLOGY | Facility: CLINIC | Age: 79
End: 2024-05-15
Payer: MEDICARE

## 2024-05-15 VITALS
BODY MASS INDEX: 29.2 KG/M2 | HEIGHT: 70 IN | DIASTOLIC BLOOD PRESSURE: 77 MMHG | OXYGEN SATURATION: 100 % | WEIGHT: 204 LBS | HEART RATE: 62 BPM | SYSTOLIC BLOOD PRESSURE: 118 MMHG

## 2024-05-15 DIAGNOSIS — I50.42 CHRONIC COMBINED SYSTOLIC AND DIASTOLIC HEART FAILURE: Primary | ICD-10-CM

## 2024-05-15 DIAGNOSIS — N18.32 STAGE 3B CHRONIC KIDNEY DISEASE: ICD-10-CM

## 2024-05-15 DIAGNOSIS — Z95.1 S/P CABG X 1: ICD-10-CM

## 2024-05-15 DIAGNOSIS — E78.5 HYPERLIPIDEMIA LDL GOAL <70: ICD-10-CM

## 2024-05-15 DIAGNOSIS — I10 PRIMARY HYPERTENSION: ICD-10-CM

## 2024-05-15 DIAGNOSIS — I25.10 CORONARY ARTERY DISEASE INVOLVING NATIVE CORONARY ARTERY OF NATIVE HEART WITHOUT ANGINA PECTORIS: ICD-10-CM

## 2024-05-15 RX ORDER — METOPROLOL SUCCINATE 25 MG/1
25 TABLET, EXTENDED RELEASE ORAL DAILY
Qty: 90 TABLET | Refills: 3 | Status: SHIPPED | OUTPATIENT
Start: 2024-05-15

## 2024-05-15 RX ORDER — ATORVASTATIN CALCIUM 80 MG/1
80 TABLET, FILM COATED ORAL NIGHTLY
Qty: 90 TABLET | Refills: 3 | Status: SHIPPED | OUTPATIENT
Start: 2024-05-15

## 2024-05-15 RX ORDER — SACUBITRIL AND VALSARTAN 24; 26 MG/1; MG/1
1 TABLET, FILM COATED ORAL 2 TIMES DAILY
Qty: 60 TABLET | Refills: 11 | Status: SHIPPED | OUTPATIENT
Start: 2024-05-15

## 2024-05-15 NOTE — PROGRESS NOTES
Chief Complaint  Congestive Heart Failure (3mo F/U) and Coronary Artery Disease    Subjective          Gallo Philip presents to [facility] for routine follow-up. He has chronic combined systolic and diastolic congestive heart failure, coronary artery disease status post CABG x1 (LIMA to LAD) 1/3/2023 per Dr. Keith Griffin at [facility], hypertension, hyperlipidemia, stage III chronic kidney disease, cerebrovascular accident, thoracic outlet syndrome and former obesity. Patient denies chest pain, shortness of breath, palpitations, dizziness, syncope, orthopnea, PND, edema or decreased stamina.  Patient denies any signs of bleeding.    Congestive Heart Failure  Presents for follow-up visit. Pertinent negatives include no abdominal pain, chest pain, chest pressure, claudication, edema, fatigue, muscle weakness, near-syncope, nocturia, orthopnea, palpitations, paroxysmal nocturnal dyspnea, shortness of breath or unexpected weight change. The symptoms have been stable. His past medical history is significant for CAD. Compliance with total regimen is 51-75%. Compliance with diet is 51-75%. Compliance with exercise is 51-75%. Compliance with medications is %.   Coronary Artery Disease  Presents for follow-up visit. Pertinent negatives include no chest pain, chest pressure, chest tightness, dizziness, leg swelling, muscle weakness, palpitations, shortness of breath or weight gain. Risk factors include hyperlipidemia. Risk factors do not include obesity. His past medical history is significant for CHF. The symptoms have been stable. Compliance with diet is variable. Compliance with exercise is variable. Compliance with medications is good.   Hypertension  This is a chronic problem. The current episode started more than 1 year ago. The problem is controlled. Pertinent negatives include no anxiety, blurred vision, chest pain, headaches, malaise/fatigue, neck pain, orthopnea,  "palpitations, peripheral edema, PND, shortness of breath or sweats. Risk factors for coronary artery disease include male gender, dyslipidemia and obesity. Current antihypertension treatment includes calcium channel blockers, beta blockers and direct vasodilators. The current treatment provides significant improvement. Hypertensive end-organ damage includes kidney disease, CAD/MI, CVA and heart failure. Identifiable causes of hypertension include chronic renal disease.   Hyperlipidemia  This is a chronic problem. The current episode started more than 1 year ago. Exacerbating diseases include chronic renal disease. He has no history of obesity. Pertinent negatives include no chest pain or shortness of breath. Current antihyperlipidemic treatment includes statins. Risk factors for coronary artery disease include male sex, obesity, hypertension and dyslipidemia.     I have reviewed and confirmed the accuracy of the ROS CAROL Perez      Objective     Current Outpatient Medications:     aspirin 81 MG tablet, Take 1 tablet by mouth Daily., Disp: , Rfl:     atorvastatin (LIPITOR) 80 MG tablet, Take 1 tablet by mouth Every Night., Disp: 90 tablet, Rfl: 3    clopidogrel (PLAVIX) 75 MG tablet, Take 1 tablet by mouth Daily., Disp: , Rfl:     colchicine 0.6 MG tablet, Take 1 tablet by mouth Daily., Disp: , Rfl:     febuxostat (ULORIC) 40 MG tablet, Take 1 tablet by mouth Daily., Disp: , Rfl:     metoprolol succinate XL (TOPROL-XL) 25 MG 24 hr tablet, Take 1 tablet by mouth Daily., Disp: 90 tablet, Rfl: 3    pantoprazole (PROTONIX) 40 MG EC tablet, Take 1 tablet by mouth Every Morning., Disp: 90 tablet, Rfl: 3    sacubitril-valsartan (Entresto) 24-26 MG tablet, Take 1 tablet by mouth 2 (Two) Times a Day., Disp: 60 tablet, Rfl: 11  Vital Signs:   /77   Pulse 62   Ht 177.8 cm (70\")   Wt 92.5 kg (204 lb)   SpO2 100%   BMI 29.27 kg/m²     Vitals and nursing note reviewed.   Constitutional:       General: " Awake.      Appearance: Normal and healthy appearance. Well-developed and not in distress. Obese.   Eyes:      General: Lids are normal.      Conjunctiva/sclera: Conjunctivae normal.      Pupils: Pupils are equal, round, and reactive to light.   HENT:      Head: Normocephalic and atraumatic.      Nose: Nose normal.   Neck:      Vascular: No JVR. JVD normal.   Pulmonary:      Effort: Pulmonary effort is normal.      Breath sounds: Normal breath sounds. No decreased breath sounds. No wheezing. No rhonchi. No rales.   Chest:      Chest wall: Not tender to palpatation.   Cardiovascular:      PMI at left midclavicular line. Normal rate. Regular rhythm. Normal S1. Normal S2.       Murmurs: There is no murmur.      No gallop.  No click. No rub.   Pulses:     Intact distal pulses.   Edema:     Peripheral edema absent.   Abdominal:      General: Bowel sounds are normal.      Palpations: Abdomen is soft.      Tenderness: There is no abdominal tenderness.   Musculoskeletal: Normal range of motion.         General: No tenderness.      Cervical back: Normal range of motion. Skin:     General: Skin is warm and dry.   Neurological:      General: No focal deficit present.      Mental Status: Alert, oriented to person, place, and time and oriented to person, place and time.   Psychiatric:         Attention and Perception: Attention and perception normal.         Mood and Affect: Mood and affect normal.         Speech: Speech normal.         Behavior: Behavior normal. Behavior is cooperative.         Thought Content: Thought content normal.         Cognition and Memory: Cognition and memory normal.         Judgment: Judgment normal.        Result Review :   The following data was reviewed by: CAROL Perez on 05/15/2024:      Data reviewed: Cardiology studies 2d echo 7/5/22, lexiscan 7/5/22, holter monitor 7/5/22 and left heart catheterization 8/5/22           Assessment and Plan    Diagnoses and all orders for this  visit:    1. Chronic combined systolic and diastolic heart failure (HCC) (Primary)- EF 43.6 % on 2D echo 4/5/2023. NYHA class II.  Stage C.  Compensated. Reviewed signs and symptoms of CHF and what to report with the patient. Patient instructed to restrict sodium and weigh daily. Report weight gain of greater than 2 lbs overnight or 5 lbs in 1 week. Pt verbalized understanding of instructions and plan of care. Continue Entresto and metoprolol succinate. Consider up titration of Entresto and/or metoprolol in the future, if tolerated.  Patient has failed Jardiance in the past due to diarrhea and nausea. He has failed spironolactone due to increased fatigue.     2. Coronary artery disease involving native coronary artery of native heart without angina pectoris- Stable.  Continue Imdur.    3. Primary hypertension-blood pressure is well controlled. Continue metoprolol succinate and Entresto. Monitor and record daily blood pressure. Report readings consistently higher than 130/80 or consistently lower than 100/60.     4. Hyperlipidemia LDL goal <70- LDL well controlled at 56 on 3/10/23. Management per PCP. Continue atorvastatin.      5. Stage 3b chronic kidney disease (HCC)- patient is following with nephrology outpatient.  Stable.  Continue to monitor following above medication adjustment.    6. S/P CABG x 1- LIMA to LAD 1/3/23 per Dr. Keith Griffin at [facility].     Follow Up   Return in about 6 months (around 11/15/2024) for Next scheduled follow up.  Patient was given instructions and counseling regarding his condition or for health maintenance advice. Please see specific information pulled into the AVS if appropriate.

## 2024-07-08 ENCOUNTER — TELEPHONE (OUTPATIENT)
Dept: CARDIOLOGY | Facility: CLINIC | Age: 79
End: 2024-07-08
Payer: MEDICARE

## 2024-07-08 NOTE — TELEPHONE ENCOUNTER
Caller: Mitzy Philip    Relationship to patient: Emergency Contact    Best call back number: [phone]     Chief complaint: LAST FEW DAYS, PT HAS HAD NO ENERGY    Type of visit: FOLLOW UP     Requested date: ANY WEDNESDAY OR FRIDAY     If rescheduling, when is the original appointment: 11.20.24     Additional notes: REQUESTED CALLBACK SINCE SHE WAS GOING INTO WORK

## 2024-07-09 NOTE — TELEPHONE ENCOUNTER
Caller: David Philip    Relationship: Emergency Contact    Best call back number: [phone]    What is the best time to reach you: ANY    Who are you requesting to speak with (clinical staff, provider,  specific staff member):     Do you know the name of the person who called: DAVID    What was the call regarding: DAVID IS CHECKING IN ON THIS. THEY NEED A WEDNESDAY OR FRIDAY APPT    Is it okay if the provider responds through MyChart: DOES NOT HAVE

## 2024-07-23 NOTE — PROGRESS NOTES
Chief Complaint  Congestive Heart Failure    Subjective        Gallo Philip presents to [facility] with complaints of worsening symptoms. He has chronic combined systolic and diastolic congestive heart failure, coronary artery disease status post CABG x1 (LIMA to LAD) 1/3/2023 per Dr. Keith Griffin at [facility], hypertension, hyperlipidemia, stage III chronic kidney disease, cerebrovascular accident, thoracic outlet syndrome and former obesity. He complains of a two month history of generalized weakness and decreased stamina. He stopped atorvastatin four days ago, as statins have caused weakness in the past. He denies any improvement in symptoms since that time. He reports chronic dyspnea with moderate exertion. Patient denies chest pain, palpitations, dizziness, syncope, orthopnea, PND, or edema.  Patient denies any signs of bleeding.    Congestive Heart Failure  Presents for follow-up visit. Associated symptoms include fatigue. Pertinent negatives include no abdominal pain, chest pain, chest pressure, claudication, edema, muscle weakness, near-syncope, nocturia, orthopnea, palpitations, paroxysmal nocturnal dyspnea, shortness of breath or unexpected weight change. The symptoms have been stable. His past medical history is significant for CAD. Compliance with total regimen is 51-75%. Compliance with diet is 51-75%. Compliance with exercise is 51-75%. Compliance with medications is %.   Coronary Artery Disease  Presents for follow-up visit. Pertinent negatives include no chest pain, chest pressure, chest tightness, dizziness, leg swelling, muscle weakness, palpitations, shortness of breath or weight gain. Risk factors include hyperlipidemia. Risk factors do not include obesity. His past medical history is significant for CHF. The symptoms have been stable. Compliance with diet is variable. Compliance with exercise is variable. Compliance with medications is good.    Hypertension  This is a chronic problem. The current episode started more than 1 year ago. The problem is controlled. Associated symptoms include malaise/fatigue. Pertinent negatives include no anxiety, blurred vision, chest pain, headaches, neck pain, orthopnea, palpitations, peripheral edema, PND, shortness of breath or sweats. Risk factors for coronary artery disease include male gender, dyslipidemia and obesity. Current antihypertension treatment includes calcium channel blockers, beta blockers and direct vasodilators. The current treatment provides significant improvement. Hypertensive end-organ damage includes kidney disease, CAD/MI, CVA and heart failure. Identifiable causes of hypertension include chronic renal disease.   Hyperlipidemia  This is a chronic problem. The current episode started more than 1 year ago. Exacerbating diseases include chronic renal disease. He has no history of obesity. Pertinent negatives include no chest pain or shortness of breath. Current antihyperlipidemic treatment includes statins. Risk factors for coronary artery disease include male sex, obesity, hypertension and dyslipidemia.     I have reviewed and confirmed the accuracy of the ROS CAROL Perez        Objective     Current Outpatient Medications:     aspirin 81 MG tablet, Take 1 tablet by mouth Daily., Disp: , Rfl:     clopidogrel (PLAVIX) 75 MG tablet, Take 1 tablet by mouth Daily., Disp: , Rfl:     colchicine 0.6 MG tablet, Take 1 tablet by mouth Daily., Disp: , Rfl:     febuxostat (ULORIC) 40 MG tablet, Take 1 tablet by mouth Daily., Disp: , Rfl:     metoprolol succinate XL (TOPROL-XL) 25 MG 24 hr tablet, Take 1 tablet by mouth Daily., Disp: 90 tablet, Rfl: 3    pantoprazole (PROTONIX) 40 MG EC tablet, Take 1 tablet by mouth Every Morning., Disp: 90 tablet, Rfl: 3    sacubitril-valsartan (Entresto) 24-26 MG tablet, Take 1 tablet by mouth 2 (Two) Times a Day., Disp: 60 tablet, Rfl: 11    Inclisiran Sodium  "(Leqvio) 284 MG/1.5ML solution prefilled syringe, Inject 1.5 mL under the skin into the appropriate area as directed 1 (One) Time for 1 dose., Disp: , Rfl:   Vital Signs:   /85   Pulse 65   Ht 177.8 cm (70\")   Wt 93 kg (205 lb)   BMI 29.41 kg/m²     Vitals and nursing note reviewed.   Constitutional:       General: Awake.      Appearance: Normal and healthy appearance. Well-developed and not in distress. Obese.   Eyes:      General: Lids are normal.      Conjunctiva/sclera: Conjunctivae normal.      Pupils: Pupils are equal, round, and reactive to light.   HENT:      Head: Normocephalic and atraumatic.      Nose: Nose normal.   Neck:      Vascular: No JVR. JVD normal.   Pulmonary:      Effort: Pulmonary effort is normal.      Breath sounds: Normal breath sounds. No decreased breath sounds. No wheezing. No rhonchi. No rales.   Chest:      Chest wall: Not tender to palpatation.   Cardiovascular:      PMI at left midclavicular line. Normal rate. Regular rhythm. Normal S1. Normal S2.       Murmurs: There is no murmur.      No gallop.  No click. No rub.   Pulses:     Intact distal pulses.   Edema:     Peripheral edema absent.   Abdominal:      General: Bowel sounds are normal.      Palpations: Abdomen is soft.      Tenderness: There is no abdominal tenderness.   Musculoskeletal: Normal range of motion.         General: No tenderness.      Cervical back: Normal range of motion. Skin:     General: Skin is warm and dry.   Neurological:      General: No focal deficit present.      Mental Status: Alert, oriented to person, place, and time and oriented to person, place and time.   Psychiatric:         Attention and Perception: Attention and perception normal.         Mood and Affect: Mood and affect normal.         Speech: Speech normal.         Behavior: Behavior normal. Behavior is cooperative.         Thought Content: Thought content normal.         Cognition and Memory: Cognition and memory normal.         " Judgment: Judgment normal.        Result Review :   The following data was reviewed by: CAROL Perez on 07/24/2024:      Data reviewed: Cardiology studies 2d echo 7/5/22, lexiscan 7/5/22, holter monitor 7/5/22 and left heart catheterization 8/5/22           Assessment and Plan    Diagnoses and all orders for this visit:    1. Chronic combined systolic and diastolic heart failure (HCC) (Primary)- EF 43.6 % on 2D echo 4/5/2023. NYHA class II.  Stage C.  Compensated. Reviewed signs and symptoms of CHF and what to report with the patient. Patient instructed to restrict sodium and weigh daily. Report weight gain of greater than 2 lbs overnight or 5 lbs in 1 week. Pt verbalized understanding of instructions and plan of care. Continue Entresto and metoprolol succinate. Consider up titration of Entresto and/or metoprolol in the future, if tolerated.  Patient has failed Jardiance in the past due to diarrhea and nausea. He has failed spironolactone due to increased fatigue. Check 2d echo.     2. Coronary artery disease involving native coronary artery of native heart without angina pectoris- decreased stamina and weakness have been his anginal equivalent. Check lexiscan. Continue Imdur.     3. Primary hypertension-blood pressure is elevated in office today, however pt reports well controlled at home with readings consistently lower than 130/80. Continue metoprolol succinate and Entresto. Monitor and record daily blood pressure. Report readings consistently higher than 130/80 or consistently lower than 100/60.     4. Hyperlipidemia LDL goal <70- LDL well controlled at 56 on 3/10/23. Pt has stopped atorvastatin due to generalized weakness, which has been caused by statins in the past. He states he is unable to give himself injections at home, so PCSK9 inhibitor would not be a good option. Start Leqvio.     5. Stage 3b chronic kidney disease (HCC)- patient is following with nephrology outpatient.  Stable.  Continue to  monitor following above medication adjustment.    6. S/P CABG x 1- LIMA to LAD 1/3/23 per Dr. Keith Griffin at [facility].     Follow Up   Return in about 4 weeks (around 8/21/2024) for Next scheduled follow up.  Patient was given instructions and counseling regarding his condition or for health maintenance advice. Please see specific information pulled into the AVS if appropriate.

## 2024-07-24 ENCOUNTER — OFFICE VISIT (OUTPATIENT)
Dept: CARDIOLOGY | Facility: CLINIC | Age: 79
End: 2024-07-24
Payer: MEDICARE

## 2024-07-24 VITALS
SYSTOLIC BLOOD PRESSURE: 145 MMHG | DIASTOLIC BLOOD PRESSURE: 85 MMHG | HEART RATE: 65 BPM | HEIGHT: 70 IN | BODY MASS INDEX: 29.35 KG/M2 | WEIGHT: 205 LBS

## 2024-07-24 DIAGNOSIS — Z95.1 S/P CABG X 1: ICD-10-CM

## 2024-07-24 DIAGNOSIS — N18.32 STAGE 3B CHRONIC KIDNEY DISEASE: ICD-10-CM

## 2024-07-24 DIAGNOSIS — E78.5 HYPERLIPIDEMIA LDL GOAL <70: ICD-10-CM

## 2024-07-24 DIAGNOSIS — I25.10 CORONARY ARTERY DISEASE INVOLVING NATIVE CORONARY ARTERY OF NATIVE HEART WITHOUT ANGINA PECTORIS: ICD-10-CM

## 2024-07-24 DIAGNOSIS — I50.42 CHRONIC COMBINED SYSTOLIC AND DIASTOLIC HEART FAILURE: Primary | ICD-10-CM

## 2024-07-24 DIAGNOSIS — I10 PRIMARY HYPERTENSION: ICD-10-CM

## 2024-07-24 DIAGNOSIS — E78.5 HYPERLIPIDEMIA LDL GOAL <70: Primary | ICD-10-CM

## 2024-07-24 RX ORDER — SODIUM CHLORIDE 9 MG/ML
20 INJECTION, SOLUTION INTRAVENOUS ONCE
OUTPATIENT
Start: 2024-08-01

## 2024-07-24 RX ORDER — DIPHENHYDRAMINE HYDROCHLORIDE 50 MG/ML
50 INJECTION INTRAMUSCULAR; INTRAVENOUS AS NEEDED
OUTPATIENT
Start: 2024-08-01

## 2024-07-24 RX ORDER — FAMOTIDINE 10 MG/ML
20 INJECTION, SOLUTION INTRAVENOUS AS NEEDED
OUTPATIENT
Start: 2024-08-01

## 2024-07-24 RX ORDER — INCLISIRAN 284 MG/1.5ML
284 INJECTION, SOLUTION SUBCUTANEOUS ONCE
Start: 2024-07-24 | End: 2024-07-24

## 2024-07-30 ENCOUNTER — TELEPHONE (OUTPATIENT)
Dept: CARDIOLOGY | Facility: CLINIC | Age: 79
End: 2024-07-30
Payer: MEDICARE

## 2024-08-01 ENCOUNTER — INFUSION (OUTPATIENT)
Dept: ONCOLOGY | Facility: HOSPITAL | Age: 79
End: 2024-08-01
Payer: MEDICARE

## 2024-08-01 VITALS
RESPIRATION RATE: 17 BRPM | OXYGEN SATURATION: 96 % | SYSTOLIC BLOOD PRESSURE: 156 MMHG | BODY MASS INDEX: 29.92 KG/M2 | TEMPERATURE: 97 F | DIASTOLIC BLOOD PRESSURE: 77 MMHG | HEART RATE: 74 BPM | HEIGHT: 70 IN | WEIGHT: 209 LBS

## 2024-08-01 DIAGNOSIS — E78.5 HYPERLIPIDEMIA LDL GOAL <70: Primary | ICD-10-CM

## 2024-08-01 PROCEDURE — 96372 THER/PROPH/DIAG INJ SC/IM: CPT

## 2024-08-01 PROCEDURE — 25010000002 INCLISIRAN SODIUM 284 MG/1.5ML SOLUTION PREFILLED SYRINGE: Performed by: NURSE PRACTITIONER

## 2024-08-01 RX ORDER — SODIUM CHLORIDE 9 MG/ML
20 INJECTION, SOLUTION INTRAVENOUS ONCE
OUTPATIENT
Start: 2024-10-30

## 2024-08-01 RX ORDER — DIPHENHYDRAMINE HYDROCHLORIDE 50 MG/ML
50 INJECTION INTRAMUSCULAR; INTRAVENOUS AS NEEDED
OUTPATIENT
Start: 2024-10-30

## 2024-08-01 RX ORDER — FAMOTIDINE 10 MG/ML
20 INJECTION, SOLUTION INTRAVENOUS AS NEEDED
OUTPATIENT
Start: 2024-10-30

## 2024-08-01 RX ADMIN — INCLISIRAN 284 MG: 284 INJECTION, SOLUTION SUBCUTANEOUS at 09:08

## 2024-08-02 RX ORDER — PANTOPRAZOLE SODIUM 40 MG/1
40 TABLET, DELAYED RELEASE ORAL EVERY MORNING
Qty: 90 TABLET | Refills: 3 | Status: SHIPPED | OUTPATIENT
Start: 2024-08-02

## 2024-08-02 NOTE — TELEPHONE ENCOUNTER
Caller: DAVID    Relationship: WIFE     Best call back number: [phone]    Requested Prescriptions:   Requested Prescriptions     Pending Prescriptions Disp Refills    pantoprazole (PROTONIX) 40 MG EC tablet 90 tablet 3     Sig: Take 1 tablet by mouth Every Morning.        Pharmacy where request should be sent: [facility] [address] [place] [address] [phone] [facility] [phone]      Last office visit with prescribing clinician: 7/24/2024   Last telemedicine visit with prescribing clinician: Visit date not found   Next office visit with prescribing clinician: 9/17/2024     Additional details provided by patient: 90 DAY PLEASE. THANK YOU!    Does the patient have less than a 3 day supply:  [] Yes  [x] No    Would you like a call back once the refill request has been completed: [] Yes [] No    If the office needs to give you a call back, can they leave a voicemail: [] Yes [] No    Adan Sethi Rep   08/02/24 13:34 CDT

## 2024-09-06 ENCOUNTER — HOSPITAL ENCOUNTER (OUTPATIENT)
Dept: CARDIOLOGY | Facility: HOSPITAL | Age: 79
Discharge: HOME | End: 2024-09-06
Payer: MEDICARE

## 2024-09-06 VITALS
DIASTOLIC BLOOD PRESSURE: 78 MMHG | HEIGHT: 70 IN | SYSTOLIC BLOOD PRESSURE: 133 MMHG | BODY MASS INDEX: 29.92 KG/M2 | WEIGHT: 209 LBS | HEART RATE: 56 BPM

## 2024-09-06 VITALS
HEIGHT: 70 IN | DIASTOLIC BLOOD PRESSURE: 85 MMHG | SYSTOLIC BLOOD PRESSURE: 145 MMHG | BODY MASS INDEX: 29.92 KG/M2 | WEIGHT: 209 LBS

## 2024-09-06 DIAGNOSIS — I25.10 CORONARY ARTERY DISEASE INVOLVING NATIVE CORONARY ARTERY OF NATIVE HEART WITHOUT ANGINA PECTORIS: ICD-10-CM

## 2024-09-06 DIAGNOSIS — I50.42 CHRONIC COMBINED SYSTOLIC AND DIASTOLIC HEART FAILURE: ICD-10-CM

## 2024-09-06 LAB
BH CV ECHO LEFT VENTRICLE GLOBAL LONGITUDINAL STRAIN: -13.2 %
BH CV ECHO MEAS - AO MAX PG: 6.8 MMHG
BH CV ECHO MEAS - AO MEAN PG: 4 MMHG
BH CV ECHO MEAS - AO ROOT DIAM: 3.4 CM
BH CV ECHO MEAS - AO V2 MAX: 130 CM/SEC
BH CV ECHO MEAS - AO V2 VTI: 29.8 CM
BH CV ECHO MEAS - AVA(I,D): 3.2 CM2
BH CV ECHO MEAS - EDV(CUBED): 54.9 ML
BH CV ECHO MEAS - EDV(MOD-SP2): 146 ML
BH CV ECHO MEAS - EDV(MOD-SP4): 128 ML
BH CV ECHO MEAS - EF(MOD-BP): 40.6 %
BH CV ECHO MEAS - EF(MOD-SP2): 41.8 %
BH CV ECHO MEAS - EF(MOD-SP4): 37 %
BH CV ECHO MEAS - ESV(CUBED): 32.8 ML
BH CV ECHO MEAS - ESV(MOD-SP2): 84.9 ML
BH CV ECHO MEAS - ESV(MOD-SP4): 80.7 ML
BH CV ECHO MEAS - FS: 15.8 %
BH CV ECHO MEAS - IVS/LVPW: 0.75 CM
BH CV ECHO MEAS - IVSD: 0.9 CM
BH CV ECHO MEAS - LA DIMENSION: 3.8 CM
BH CV ECHO MEAS - LAT PEAK E' VEL: 5.2 CM/SEC
BH CV ECHO MEAS - LV DIASTOLIC VOL/BSA (35-75): 60.2 CM2
BH CV ECHO MEAS - LV MASS(C)D: 125.8 GRAMS
BH CV ECHO MEAS - LV MAX PG: 2.7 MMHG
BH CV ECHO MEAS - LV MEAN PG: 1 MMHG
BH CV ECHO MEAS - LV SYSTOLIC VOL/BSA (12-30): 37.9 CM2
BH CV ECHO MEAS - LV V1 MAX: 81.4 CM/SEC
BH CV ECHO MEAS - LV V1 VTI: 17.9 CM
BH CV ECHO MEAS - LVIDD: 3.8 CM
BH CV ECHO MEAS - LVIDS: 3.2 CM
BH CV ECHO MEAS - LVOT AREA: 5.3 CM2
BH CV ECHO MEAS - LVOT DIAM: 2.6 CM
BH CV ECHO MEAS - LVPWD: 1.2 CM
BH CV ECHO MEAS - MED PEAK E' VEL: 3.7 CM/SEC
BH CV ECHO MEAS - MV A MAX VEL: 70.3 CM/SEC
BH CV ECHO MEAS - MV DEC TIME: 0.25 SEC
BH CV ECHO MEAS - MV E MAX VEL: 47.1 CM/SEC
BH CV ECHO MEAS - MV E/A: 0.67
BH CV ECHO MEAS - RAP SYSTOLE: 5 MMHG
BH CV ECHO MEAS - RVSP: 23 MMHG
BH CV ECHO MEAS - SV(LVOT): 95 ML
BH CV ECHO MEAS - SV(MOD-SP2): 61.1 ML
BH CV ECHO MEAS - SV(MOD-SP4): 47.3 ML
BH CV ECHO MEAS - SVI(LVOT): 44.7 ML/M2
BH CV ECHO MEAS - SVI(MOD-SP2): 28.7 ML/M2
BH CV ECHO MEAS - SVI(MOD-SP4): 22.2 ML/M2
BH CV ECHO MEAS - TR MAX PG: 18 MMHG
BH CV ECHO MEAS - TR MAX VEL: 212 CM/SEC
BH CV ECHO MEASUREMENTS AVERAGE E/E' RATIO: 10.58
BH CV XLRA - RV BASE: 3.5 CM
BH CV XLRA - RV LENGTH: 6.6 CM
BH CV XLRA - RV MID: 3.2 CM
LEFT ATRIUM VOLUME INDEX: 26.5 ML/M2
LEFT ATRIUM VOLUME: 56.5 ML

## 2024-09-06 PROCEDURE — 0 TECHNETIUM TETROFOSMIN KIT: Performed by: NURSE PRACTITIONER

## 2024-09-06 PROCEDURE — A9502 TC99M TETROFOSMIN: HCPCS | Performed by: NURSE PRACTITIONER

## 2024-09-06 PROCEDURE — 25510000001 PERFLUTREN 6.52 MG/ML SUSPENSION: Performed by: INTERNAL MEDICINE

## 2024-09-06 PROCEDURE — 93356 MYOCRD STRAIN IMG SPCKL TRCK: CPT

## 2024-09-06 PROCEDURE — 93017 CV STRESS TEST TRACING ONLY: CPT

## 2024-09-06 PROCEDURE — 78452 HT MUSCLE IMAGE SPECT MULT: CPT

## 2024-09-06 PROCEDURE — 25010000002 REGADENOSON 0.4 MG/5ML SOLUTION: Performed by: INTERNAL MEDICINE

## 2024-09-06 PROCEDURE — 93306 TTE W/DOPPLER COMPLETE: CPT

## 2024-09-06 RX ORDER — REGADENOSON 0.08 MG/ML
0.4 INJECTION, SOLUTION INTRAVENOUS ONCE
Status: COMPLETED | OUTPATIENT
Start: 2024-09-06 | End: 2024-09-06

## 2024-09-06 RX ADMIN — TETROFOSMIN 1 DOSE: 1.38 INJECTION, POWDER, LYOPHILIZED, FOR SOLUTION INTRAVENOUS at 07:50

## 2024-09-06 RX ADMIN — PERFLUTREN 9.78 MG: 6.52 INJECTION, SUSPENSION INTRAVENOUS at 08:21

## 2024-09-06 RX ADMIN — REGADENOSON 0.4 MG: 0.08 INJECTION, SOLUTION INTRAVENOUS at 09:06

## 2024-09-06 RX ADMIN — TETROFOSMIN 1 DOSE: 1.38 INJECTION, POWDER, LYOPHILIZED, FOR SOLUTION INTRAVENOUS at 09:15

## 2024-09-08 LAB
BH CV NUCLEAR PRIOR STUDY: 3
BH CV REST NUCLEAR ISOTOPE DOSE: 11 MCI
BH CV STRESS BP STAGE 1: NORMAL
BH CV STRESS COMMENTS STAGE 1: NORMAL
BH CV STRESS DOSE REGADENOSON STAGE 1: 0.4
BH CV STRESS DURATION MIN STAGE 1: 0
BH CV STRESS DURATION SEC STAGE 1: 10
BH CV STRESS HR STAGE 1: 71
BH CV STRESS NUCLEAR ISOTOPE DOSE: 34.6 MCI
BH CV STRESS PROTOCOL 1: NORMAL
BH CV STRESS RECOVERY BP: NORMAL MMHG
BH CV STRESS RECOVERY HR: 66 BPM
BH CV STRESS STAGE 1: 1
LV EF NUC BP: 49 %
MAXIMAL PREDICTED HEART RATE: 143 BPM
PERCENT MAX PREDICTED HR: 49.65 %
STRESS BASELINE BP: NORMAL MMHG
STRESS BASELINE HR: 56 BPM
STRESS PERCENT HR: 58 %
STRESS POST EXERCISE DUR MIN: 0 MIN
STRESS POST EXERCISE DUR SEC: 10 SEC
STRESS POST PEAK BP: NORMAL MMHG
STRESS POST PEAK HR: 71 BPM
STRESS TARGET HR: 122 BPM

## 2024-09-17 ENCOUNTER — OFFICE VISIT (OUTPATIENT)
Dept: CARDIOLOGY | Facility: CLINIC | Age: 79
End: 2024-09-17
Payer: MEDICARE

## 2024-09-17 VITALS
DIASTOLIC BLOOD PRESSURE: 85 MMHG | WEIGHT: 203 LBS | SYSTOLIC BLOOD PRESSURE: 136 MMHG | BODY MASS INDEX: 29.06 KG/M2 | OXYGEN SATURATION: 98 % | HEIGHT: 70 IN | HEART RATE: 66 BPM

## 2024-09-17 DIAGNOSIS — I10 PRIMARY HYPERTENSION: ICD-10-CM

## 2024-09-17 DIAGNOSIS — E78.5 HYPERLIPIDEMIA LDL GOAL <70: ICD-10-CM

## 2024-09-17 DIAGNOSIS — I25.10 CORONARY ARTERY DISEASE INVOLVING NATIVE CORONARY ARTERY OF NATIVE HEART WITHOUT ANGINA PECTORIS: ICD-10-CM

## 2024-09-17 DIAGNOSIS — N18.32 STAGE 3B CHRONIC KIDNEY DISEASE: ICD-10-CM

## 2024-09-17 DIAGNOSIS — Z95.1 S/P CABG X 1: ICD-10-CM

## 2024-09-17 DIAGNOSIS — I50.42 CHRONIC COMBINED SYSTOLIC AND DIASTOLIC HEART FAILURE: Primary | ICD-10-CM

## 2024-09-17 RX ORDER — SACUBITRIL AND VALSARTAN 49; 51 MG/1; MG/1
1 TABLET, FILM COATED ORAL 2 TIMES DAILY
Qty: 56 TABLET | Refills: 0 | COMMUNITY
Start: 2024-09-17

## 2024-09-17 RX ORDER — INCLISIRAN 284 MG/1.5ML
INJECTION, SOLUTION SUBCUTANEOUS
COMMUNITY

## 2024-10-04 ENCOUNTER — TELEPHONE (OUTPATIENT)
Dept: CARDIOLOGY | Facility: CLINIC | Age: 79
End: 2024-10-04
Payer: MEDICARE

## 2024-10-04 NOTE — TELEPHONE ENCOUNTER
PT TO UNDERGO DENTAL EXTRACTIONS WITH NON IV SEDATION. PLEASE ADVISE IF OKAY TO PROCEED & RECOMMENDATIONS FOR STOPPING PLAVIX PRIOR TO PROCEDURE    PLEASE ADVISE  FORM TO BE SIGNED

## 2024-10-14 ENCOUNTER — TELEPHONE (OUTPATIENT)
Dept: CARDIOLOGY | Facility: CLINIC | Age: 79
End: 2024-10-14

## 2024-10-14 RX ORDER — SACUBITRIL AND VALSARTAN 49; 51 MG/1; MG/1
1 TABLET, FILM COATED ORAL 2 TIMES DAILY
Qty: 56 TABLET | Refills: 0 | COMMUNITY
Start: 2024-10-14 | End: 2024-10-15 | Stop reason: SDUPTHER

## 2024-10-14 NOTE — TELEPHONE ENCOUNTER
Caller: David Philip    Relationship: Emergency Contact    Best call back number: [phone]     Caller requesting test results: DAVID PHILIP- SPOUSE    What test was performed: LABS    When was the test performed: 9.25.24    Where was the test performed: [facility]     Additional notes:

## 2024-10-14 NOTE — TELEPHONE ENCOUNTER
Caller: David Philip    Relationship: Emergency Contact    Best call back number: [phone]     Requested Prescriptions:   Requested Prescriptions     Pending Prescriptions Disp Refills    sacubitril-valsartan (Entresto) 49-51 MG tablet 56 tablet 0     Sig: Take 1 tablet by mouth 2 (Two) Times a Day.        Pharmacy where request should be sent: [facility] [address] [place] [address] [phone] [facility] [phone]      Last office visit with prescribing clinician: 9/17/2024   Last telemedicine visit with prescribing clinician: Visit date not found   Next office visit with prescribing clinician: 11/8/2024     Additional details provided by patient: PATIENT HAS 2 PILLS LEFT, ONE FOR TONIGHT AND ONE FOR IN THE MORNING. PATIENT'S SPOUSE DAVID STATES THIS MEDICATION MG IS WORKING FOR THE PATIENT.     Does the patient have less than a 3 day supply:  [x] Yes  [] No    Would you like a call back once the refill request has been completed: [x] Yes [] No    If the office needs to give you a call back, can they leave a voicemail: [x] Yes [] No    Adan Quinn Rep   10/14/24 08:26 CDT

## 2024-10-15 RX ORDER — SACUBITRIL AND VALSARTAN 49; 51 MG/1; MG/1
1 TABLET, FILM COATED ORAL 2 TIMES DAILY
Qty: 60 TABLET | Refills: 11 | Status: SHIPPED | OUTPATIENT
Start: 2024-10-15 | End: 2024-10-15 | Stop reason: SDUPTHER

## 2024-10-15 RX ORDER — SACUBITRIL AND VALSARTAN 49; 51 MG/1; MG/1
1 TABLET, FILM COATED ORAL 2 TIMES DAILY
Qty: 30 TABLET | Refills: 0 | Status: SHIPPED | OUTPATIENT
Start: 2024-10-15

## 2024-10-15 NOTE — TELEPHONE ENCOUNTER
[facility] will not have Entresto in stock for a few days. Patient request 1 month sent to [facility]. Samples offered for p/u. She will try and if able

## 2024-10-21 ENCOUNTER — NURSE ONLY (OUTPATIENT)
Age: 79
End: 2024-10-21
Payer: MEDICARE

## 2024-10-21 VITALS — WEIGHT: 210 LBS | BODY MASS INDEX: 30.06 KG/M2 | HEIGHT: 70 IN

## 2024-10-21 DIAGNOSIS — M25.561 CHRONIC PAIN OF RIGHT KNEE: ICD-10-CM

## 2024-10-21 DIAGNOSIS — G89.29 CHRONIC PAIN OF RIGHT KNEE: ICD-10-CM

## 2024-10-21 DIAGNOSIS — M17.11 PRIMARY OSTEOARTHRITIS OF ONE KNEE, RIGHT: Primary | ICD-10-CM

## 2024-10-21 PROCEDURE — 20610 DRAIN/INJ JOINT/BURSA W/O US: CPT | Performed by: PHYSICIAN ASSISTANT

## 2024-10-21 RX ORDER — BUPIVACAINE HYDROCHLORIDE 5 MG/ML
3 INJECTION, SOLUTION PERINEURAL ONCE
Status: COMPLETED | OUTPATIENT
Start: 2024-10-21 | End: 2024-10-21

## 2024-10-21 RX ORDER — ATORVASTATIN CALCIUM 80 MG/1
1 TABLET, FILM COATED ORAL
COMMUNITY

## 2024-10-21 RX ORDER — BETAMETHASONE SODIUM PHOSPHATE AND BETAMETHASONE ACETATE 3; 3 MG/ML; MG/ML
12 INJECTION, SUSPENSION INTRA-ARTICULAR; INTRALESIONAL; INTRAMUSCULAR; SOFT TISSUE ONCE
Status: COMPLETED | OUTPATIENT
Start: 2024-10-21 | End: 2024-10-21

## 2024-10-21 RX ORDER — LIDOCAINE HYDROCHLORIDE 10 MG/ML
2.5 INJECTION, SOLUTION INFILTRATION; PERINEURAL ONCE
Status: COMPLETED | OUTPATIENT
Start: 2024-10-21 | End: 2024-10-21

## 2024-10-21 RX ADMIN — BUPIVACAINE HYDROCHLORIDE 15 MG: 5 INJECTION, SOLUTION PERINEURAL at 11:47

## 2024-10-21 RX ADMIN — BETAMETHASONE SODIUM PHOSPHATE AND BETAMETHASONE ACETATE 12 MG: 3; 3 INJECTION, SUSPENSION INTRA-ARTICULAR; INTRALESIONAL; INTRAMUSCULAR; SOFT TISSUE at 11:46

## 2024-10-21 RX ADMIN — LIDOCAINE HYDROCHLORIDE 2.5 ML: 10 INJECTION, SOLUTION INFILTRATION; PERINEURAL at 11:51

## 2024-10-21 NOTE — PROGRESS NOTES
NDC: 33906-10801    Lot#: [id number]    : JEREMIAH    Patient Supplied?: No              lidocaine 1 % injection 2.5 mL       Admin Date  10/21/2024  11:51 Action  Given Dose  2.5 mL Route  Intra-artICUlar Site   Documented By  Lalitha Carmichael MA    NDC: 3915-6399-44    Lot#: [id number]    : HOSPIRA    Patient Supplied?: No                     --------------------------------------------------------------------------------------------------------------------    Assessment:   Encounter Diagnoses   Name Primary?    Primary osteoarthritis of one knee, right Yes    Chronic pain of right knee         Plan:  At the patient's request, we will proceed with a repeat corticosteroid injection of right knee today. Patient will return in 3 months for repeat injection as needed. Patient may follow up with knee joint clinic for future injections. All questions were answered.      Electronically signed by Thomas Mathur PA-C on 10/21/2024 at 12:11 PM

## 2024-10-25 ENCOUNTER — TELEPHONE (OUTPATIENT)
Age: 79
End: 2024-10-25

## 2024-10-25 DIAGNOSIS — M17.11 PRIMARY OSTEOARTHRITIS OF ONE KNEE, RIGHT: Primary | ICD-10-CM

## 2024-10-29 ENCOUNTER — TELEPHONE (OUTPATIENT)
Dept: CARDIOLOGY | Facility: CLINIC | Age: 79
End: 2024-10-29
Payer: MEDICARE

## 2024-10-29 NOTE — TELEPHONE ENCOUNTER
"AFTER SPEAKING WITH JIE MEDINA SHE STATED FOR THE RT TKR ON 04/14/2025    Toya Ramey APRN     10/7/24 11:00 AM  Risk assessment- from a revised cardiac risk index standpoint, patient is moderate risk for a major cardiac event with this surgery. This is primarily because he has a known history of coronary/ischemic heart disease, congestive heart failure, and cerebrovascular disease, but no known history of insulin-dependent diabetes mellitus, or creatinine greater than 2. This correlates to a >11% estimated rate of myocardial infarction, pulmonary edema, ventricular fibrillation, cardiac arrest, or complete heart block. However, this is non-modifiable because he has no signs or symptoms of the following \"active cardiac conditions\"- acute coronary syndrome, acutely decompensated congestive heart failure, uncontrolled arrhythmias, or significant valvular disease. Therefore, recommend proceeding with the planned surgery without further delay.    It is acceptable for this patient to hold Plavix for >5 days prior to surgery/invasive procedure per  recommendations, as this patient has not had a coronary artery stent placed within the last 12 months. Resume Plavix as soon as possible. Continue aspirin 81 mg daily without interruption.  "

## 2024-11-01 ENCOUNTER — TELEPHONE (OUTPATIENT)
Dept: CARDIOLOGY | Facility: CLINIC | Age: 79
End: 2024-11-01

## 2024-11-01 ENCOUNTER — INFUSION (OUTPATIENT)
Dept: ONCOLOGY | Facility: HOSPITAL | Age: 79
End: 2024-11-01
Payer: MEDICARE

## 2024-11-01 VITALS
RESPIRATION RATE: 17 BRPM | SYSTOLIC BLOOD PRESSURE: 151 MMHG | WEIGHT: 200 LBS | HEART RATE: 61 BPM | OXYGEN SATURATION: 96 % | BODY MASS INDEX: 28.63 KG/M2 | HEIGHT: 70 IN | TEMPERATURE: 97.8 F | DIASTOLIC BLOOD PRESSURE: 71 MMHG

## 2024-11-01 DIAGNOSIS — E78.5 HYPERLIPIDEMIA LDL GOAL <70: Primary | ICD-10-CM

## 2024-11-01 PROCEDURE — G0463 HOSPITAL OUTPT CLINIC VISIT: HCPCS

## 2024-11-01 NOTE — PROGRESS NOTES
Patient has not had the 90 day lipid profile and he is not fasting today. Spoke with Hoa who discussed ADAM MEDINA reschedule Leqvio injection with fasting lipid profile for next Monday. Patient aware and appointment time given.

## 2024-11-01 NOTE — TELEPHONE ENCOUNTER
Caller: Mitzy Philip    Relationship: Emergency Contact    Best call back number: [phone]     What is the best time to reach you: ANY    Who are you requesting to speak with (clinical staff, provider,  specific staff member): SPECIFIC STAFF    Do you know the name of the person who called: FEDERICO    What was the call regarding: WAS SUPPOSED TO HAVE AN INJECTION TODAY 11.01.2024 BUT THEY DID NOT SEE HIM BECAUSE HE WAS SUPPOSED TO DO BLOODWORK AND WANTED TO KNOW WHAT TYPE OF BLOODWORK HE IS SUPPOSED TO DO AND WHERE TO GET IT DONE.    Is it okay if the provider responds through MyChart: NO WANTS A CALL

## 2024-11-04 ENCOUNTER — LAB (OUTPATIENT)
Dept: LAB | Facility: HOSPITAL | Age: 79
End: 2024-11-04
Payer: MEDICARE

## 2024-11-04 ENCOUNTER — INFUSION (OUTPATIENT)
Dept: ONCOLOGY | Facility: HOSPITAL | Age: 79
End: 2024-11-04
Payer: MEDICARE

## 2024-11-04 VITALS
OXYGEN SATURATION: 99 % | SYSTOLIC BLOOD PRESSURE: 149 MMHG | RESPIRATION RATE: 20 BRPM | TEMPERATURE: 97.1 F | HEART RATE: 60 BPM | DIASTOLIC BLOOD PRESSURE: 71 MMHG

## 2024-11-04 DIAGNOSIS — I50.42 CHRONIC COMBINED SYSTOLIC AND DIASTOLIC HEART FAILURE: Primary | ICD-10-CM

## 2024-11-04 DIAGNOSIS — E78.5 HYPERLIPIDEMIA LDL GOAL <70: Primary | ICD-10-CM

## 2024-11-04 DIAGNOSIS — E78.5 HYPERLIPIDEMIA LDL GOAL <70: ICD-10-CM

## 2024-11-04 LAB
ANION GAP SERPL CALCULATED.3IONS-SCNC: 13 MMOL/L (ref 5–15)
BUN SERPL-MCNC: 25 MG/DL (ref 8–23)
BUN/CREAT SERPL: 14.3 (ref 7–25)
CALCIUM SPEC-SCNC: 9.6 MG/DL (ref 8.6–10.5)
CHLORIDE SERPL-SCNC: 106 MMOL/L (ref 98–107)
CHOLEST SERPL-MCNC: 127 MG/DL (ref 0–200)
CO2 SERPL-SCNC: 22 MMOL/L (ref 22–29)
CREAT SERPL-MCNC: 1.75 MG/DL (ref 0.76–1.27)
EGFRCR SERPLBLD CKD-EPI 2021: 39.6 ML/MIN/1.73
GLUCOSE SERPL-MCNC: 150 MG/DL (ref 65–99)
HDLC SERPL-MCNC: 31 MG/DL (ref 40–60)
LDLC SERPL CALC-MCNC: 69 MG/DL (ref 0–100)
LDLC/HDLC SERPL: 2.11 {RATIO}
POTASSIUM SERPL-SCNC: 4.1 MMOL/L (ref 3.5–5.2)
SODIUM SERPL-SCNC: 141 MMOL/L (ref 136–145)
TRIGL SERPL-MCNC: 153 MG/DL (ref 0–150)
VLDLC SERPL-MCNC: 27 MG/DL (ref 5–40)
WHOLE BLOOD HOLD SPECIMEN: NORMAL

## 2024-11-04 PROCEDURE — 80061 LIPID PANEL: CPT

## 2024-11-04 PROCEDURE — 80048 BASIC METABOLIC PNL TOTAL CA: CPT

## 2024-11-04 PROCEDURE — 96372 THER/PROPH/DIAG INJ SC/IM: CPT

## 2024-11-04 PROCEDURE — 36415 COLL VENOUS BLD VENIPUNCTURE: CPT

## 2024-11-04 PROCEDURE — 25010000002 INCLISIRAN SODIUM 284 MG/1.5ML SOLUTION PREFILLED SYRINGE: Performed by: NURSE PRACTITIONER

## 2024-11-04 RX ORDER — DIPHENHYDRAMINE HYDROCHLORIDE 50 MG/ML
50 INJECTION INTRAMUSCULAR; INTRAVENOUS AS NEEDED
OUTPATIENT
Start: 2025-01-28

## 2024-11-04 RX ORDER — FAMOTIDINE 10 MG/ML
20 INJECTION, SOLUTION INTRAVENOUS AS NEEDED
OUTPATIENT
Start: 2025-01-28

## 2024-11-04 RX ORDER — SODIUM CHLORIDE 9 MG/ML
20 INJECTION, SOLUTION INTRAVENOUS ONCE
OUTPATIENT
Start: 2025-01-28

## 2024-11-04 RX ADMIN — INCLISIRAN 284 MG: 284 INJECTION, SOLUTION SUBCUTANEOUS at 08:25

## 2024-11-07 NOTE — TELEPHONE ENCOUNTER
Patient Quality Outreach    Patient is due for the following:   Asthma  -  Asthma follow-up visit    Next Steps:   Pt moved to [place]    Type of outreach:    Phone, spoke to patient/parent.      Gray Walters, CMA         Patient's wife advised to call PCP to discuss medication for gout.  She verbalized understanding and is agreeable

## 2024-11-19 NOTE — PROGRESS NOTES
Chief Complaint  Congestive Heart Failure (8wk F/U. Increased Entresto LOV) and Results (lab)    Subjective        Gallo Philip presents to [facility] for routine follow-up of medication adjustment.  Entresto was increased to 49/51 mg twice daily at his last office visit on 9/17/2024.  Follow-up BMP on 9/25/2024 revealed normal potassium with creatinine near baseline at 1.8.  He has chronic combined systolic and diastolic congestive heart failure, coronary artery disease status post CABG x1 (LIMA to LAD) 1/3/2023 per Dr. Keith Griffin at [facility], hypertension, hyperlipidemia, stage III chronic kidney disease, cerebrovascular accident, thoracic outlet syndrome and former obesity. He continues to complain of generalized weakness and decreased stamina.  He reports chronic dyspnea with moderate exertion. Patient denies chest pain, palpitations, dizziness, syncope, orthopnea, PND, or edema.  Patient denies any signs of bleeding.    Congestive Heart Failure  Presents for follow-up visit. Associated symptoms include fatigue. Pertinent negatives include no abdominal pain, chest pain, chest pressure, claudication, edema, muscle weakness, near-syncope, nocturia, orthopnea, palpitations, paroxysmal nocturnal dyspnea, shortness of breath or unexpected weight change. The symptoms have been stable. His past medical history is significant for CAD. Compliance with total regimen is 51-75%. Compliance with diet is 51-75%. Compliance with exercise is 51-75%. Compliance with medications is %.   Coronary Artery Disease  Presents for follow-up visit. Pertinent negatives include no chest pain, chest pressure, chest tightness, dizziness, leg swelling, muscle weakness, palpitations, shortness of breath or weight gain. Risk factors include hyperlipidemia. Risk factors do not include obesity. His past medical history is significant for CHF. The symptoms have been stable. Compliance with diet  is variable. Compliance with exercise is variable. Compliance with medications is good.   Hypertension  This is a chronic problem. The current episode started more than 1 year ago. The problem is controlled. Associated symptoms include malaise/fatigue. Pertinent negatives include no anxiety, blurred vision, chest pain, headaches, neck pain, orthopnea, palpitations, peripheral edema, PND, shortness of breath or sweats. Risk factors for coronary artery disease include male gender, dyslipidemia and obesity. Current antihypertension treatment includes calcium channel blockers, beta blockers and direct vasodilators. The current treatment provides significant improvement. Hypertensive end-organ damage includes kidney disease, CAD/MI, CVA and heart failure. Identifiable causes of hypertension include chronic renal disease.   Hyperlipidemia  This is a chronic problem. The current episode started more than 1 year ago. Exacerbating diseases include chronic renal disease. He has no history of obesity. Pertinent negatives include no chest pain or shortness of breath. Current antihyperlipidemic treatment includes statins. Risk factors for coronary artery disease include male sex, obesity, hypertension and dyslipidemia.     I have reviewed and confirmed the accuracy of the ROS CAROL Perez      Objective     Current Outpatient Medications:     aspirin 81 MG tablet, Take 1 tablet by mouth Daily., Disp: , Rfl:     clopidogrel (PLAVIX) 75 MG tablet, Take 1 tablet by mouth Daily., Disp: , Rfl:     colchicine 0.6 MG tablet, Take 1 tablet by mouth Daily., Disp: , Rfl:     febuxostat (ULORIC) 40 MG tablet, Take 1 tablet by mouth Daily., Disp: , Rfl:     Inclisiran Sodium (Leqvio) 284 MG/1.5ML solution prefilled syringe, Inject  under the skin into the appropriate area as directed., Disp: , Rfl:     metoprolol succinate XL (TOPROL-XL) 25 MG 24 hr tablet, Take 1 tablet by mouth Daily., Disp: 90 tablet, Rfl: 3    pantoprazole  "(PROTONIX) 40 MG EC tablet, Take 1 tablet by mouth Every Morning., Disp: 90 tablet, Rfl: 3    sacubitril-valsartan (Entresto) 49-51 MG tablet, Take 1 tablet by mouth 2 (Two) Times a Day., Disp: 30 tablet, Rfl: 0  Vital Signs:   /89   Pulse 65   Ht 177.8 cm (70\")   Wt 91.6 kg (202 lb)   SpO2 98%   BMI 28.98 kg/m²     Vitals and nursing note reviewed.   Constitutional:       General: Awake.      Appearance: Normal and healthy appearance. Well-developed and not in distress. Obese.   Eyes:      General: Lids are normal.      Conjunctiva/sclera: Conjunctivae normal.      Pupils: Pupils are equal, round, and reactive to light.   HENT:      Head: Normocephalic and atraumatic.      Nose: Nose normal.   Neck:      Vascular: No JVR. JVD normal.   Pulmonary:      Effort: Pulmonary effort is normal.      Breath sounds: Normal breath sounds. No decreased breath sounds. No wheezing. No rhonchi. No rales.   Chest:      Chest wall: Not tender to palpatation.   Cardiovascular:      PMI at left midclavicular line. Normal rate. Regular rhythm. Normal S1. Normal S2.       Murmurs: There is no murmur.      No gallop.  No click. No rub.   Pulses:     Intact distal pulses.   Edema:     Peripheral edema absent.   Abdominal:      General: Bowel sounds are normal.      Palpations: Abdomen is soft.      Tenderness: There is no abdominal tenderness.   Musculoskeletal: Normal range of motion.         General: No tenderness.      Cervical back: Normal range of motion. Skin:     General: Skin is warm and dry.   Neurological:      General: No focal deficit present.      Mental Status: Alert, oriented to person, place, and time and oriented to person, place and time.   Psychiatric:         Attention and Perception: Attention and perception normal.         Mood and Affect: Mood and affect normal.         Speech: Speech normal.         Behavior: Behavior normal. Behavior is cooperative.         Thought Content: Thought content normal.    "      Cognition and Memory: Cognition and memory normal.         Judgment: Judgment normal.        Result Review :   The following data was reviewed by: CAROL Perez on 11/21/2024:  Common labs          11/4/2024    08:10   Common Labs   Glucose 150    BUN 25    Creatinine 1.75    Sodium 141    Potassium 4.1    Chloride 106    Calcium 9.6    Total Cholesterol 127    Triglycerides 153    HDL Cholesterol 31    LDL Cholesterol  69        Data reviewed: Cardiology studies 2d echo 7/5/22 and 9/6/2024, lexiscan 7/5/22 and 9/6/2024, holter monitor 7/5/22 and left heart catheterization 8/5/22           Assessment and Plan    Diagnoses and all orders for this visit:    1. Chronic combined systolic and diastolic heart failure (HCC) (Primary)- EF 43.6 % on 2D echo 4/5/2023. NYHA class II.  Stage C.  Compensated. Reviewed signs and symptoms of CHF and what to report with the patient. Patient instructed to restrict sodium and weigh daily. Report weight gain of greater than 2 lbs overnight or 5 lbs in 1 week. Pt verbalized understanding of instructions and plan of care.  Continue Entresto and metoprolol succinate. Consider up titration of Entresto and/or metoprolol in the future, if tolerated.  Patient has failed Jardiance in the past due to diarrhea and nausea. He has failed spironolactone due to increased fatigue.     2. Coronary artery disease involving native coronary artery of native heart with stable angina pectoris- decreased stamina and weakness have been his anginal equivalent.  Negative Marylou scan 9/6/2024.  Stable.     3. Primary hypertension-blood pressure is elevated in office today, however pt reports consistently lower than 130/80 at home. Continue metoprolol succinate and Entresto. Monitor and record daily blood pressure. Report readings consistently higher than 130/80 or consistently lower than 100/60.     4. Hyperlipidemia LDL goal <70- LDL well controlled at 56 on 3/10/23.  Continue Leqvio.  Check  lipid panel in 6 weeks.    5. Stage 3b chronic kidney disease (HCC)- patient is following with nephrology outpatient.  Stable.      6. S/P CABG x 1- LIMA to LAD 1/3/23 per Dr. Keith Griffin at [facility].  Patient continues on aspirin and Plavix.  Denies bleeding.    Follow Up   Return in about 3 months (around 2/21/2025) for Next scheduled follow up.  Patient was given instructions and counseling regarding his condition or for health maintenance advice. Please see specific information pulled into the AVS if appropriate.

## 2024-11-21 ENCOUNTER — OFFICE VISIT (OUTPATIENT)
Dept: CARDIOLOGY | Facility: CLINIC | Age: 79
End: 2024-11-21
Payer: MEDICARE

## 2024-11-21 VITALS
HEART RATE: 65 BPM | WEIGHT: 202 LBS | SYSTOLIC BLOOD PRESSURE: 143 MMHG | OXYGEN SATURATION: 98 % | BODY MASS INDEX: 28.92 KG/M2 | DIASTOLIC BLOOD PRESSURE: 89 MMHG | HEIGHT: 70 IN

## 2024-11-21 DIAGNOSIS — N18.32 STAGE 3B CHRONIC KIDNEY DISEASE: ICD-10-CM

## 2024-11-21 DIAGNOSIS — E78.5 HYPERLIPIDEMIA LDL GOAL <70: ICD-10-CM

## 2024-11-21 DIAGNOSIS — Z95.1 S/P CABG X 1: ICD-10-CM

## 2024-11-21 DIAGNOSIS — I25.10 CORONARY ARTERY DISEASE INVOLVING NATIVE CORONARY ARTERY OF NATIVE HEART WITHOUT ANGINA PECTORIS: ICD-10-CM

## 2024-11-21 DIAGNOSIS — I50.42 CHRONIC COMBINED SYSTOLIC AND DIASTOLIC HEART FAILURE: Primary | ICD-10-CM

## 2024-11-21 DIAGNOSIS — I10 PRIMARY HYPERTENSION: ICD-10-CM

## 2025-01-24 ENCOUNTER — TELEPHONE (OUTPATIENT)
Age: 80
End: 2025-01-24

## 2025-01-24 NOTE — TELEPHONE ENCOUNTER
Patients wife called stating mag has changed him mind about having knee replacement surgery and wants to cancel it. Jeanette wanted you to call her directly

## 2025-01-28 NOTE — TELEPHONE ENCOUNTER
Patient is canceling his surgery. His wife Jeanette stated they would call back for an appointment to get back in, when they decide. Pre Op and Sx has been cancelled

## 2025-02-20 PROBLEM — I25.118 CORONARY ARTERY DISEASE OF NATIVE ARTERY OF NATIVE HEART WITH STABLE ANGINA PECTORIS: Status: ACTIVE | Noted: 2022-06-24

## 2025-02-20 NOTE — PROGRESS NOTES
Chief Complaint  Congestive Heart Failure (4mo F/U), Coronary Artery Disease, Hypertension, and Surgical Clearance (Cataract-Dr. Palmer)    Subjective        Gallo Philip presents to [facility] for routine follow-up.  He has chronic combined systolic and diastolic congestive heart failure, coronary artery disease status post CABG x1 (LIMA to LAD) 1/3/2023 per Dr. Keith Griffin at [facility], hypertension, hyperlipidemia, stage III chronic kidney disease, cerebrovascular accident, thoracic outlet syndrome and former obesity. He continues to complain of generalized weakness and decreased stamina.  He reports chronic dyspnea with moderate exertion. Patient denies chest pain, palpitations, dizziness, syncope, orthopnea, PND, or edema.  Patient denies any signs of bleeding.    Congestive Heart Failure  Presents for follow-up visit. Associated symptoms include fatigue. Pertinent negatives include no abdominal pain, chest pain, chest pressure, claudication, edema, muscle weakness, near-syncope, nocturia, orthopnea, palpitations, paroxysmal nocturnal dyspnea, shortness of breath or unexpected weight change. The symptoms have been stable. His past medical history is significant for CAD. Compliance with total regimen is 51-75%. Compliance with diet is 51-75%. Compliance with exercise is 51-75%. Compliance with medications is %.   Coronary Artery Disease  Presents for follow-up visit. Pertinent negatives include no chest pain, chest pressure, chest tightness, dizziness, leg swelling, muscle weakness, palpitations, shortness of breath or weight gain. Risk factors include hyperlipidemia. Risk factors do not include obesity. His past medical history is significant for CHF. The symptoms have been stable. Compliance with diet is variable. Compliance with exercise is variable. Compliance with medications is good.   Hypertension  This is a chronic problem. The current episode started  more than 1 year ago. The problem is controlled. Associated symptoms include malaise/fatigue. Pertinent negatives include no anxiety, blurred vision, chest pain, headaches, neck pain, orthopnea, palpitations, peripheral edema, PND, shortness of breath or sweats. Risk factors for coronary artery disease include male gender, dyslipidemia and obesity. Current antihypertension treatment includes calcium channel blockers, beta blockers and direct vasodilators. The current treatment provides significant improvement. Hypertensive end-organ damage includes kidney disease, CAD/MI, CVA and heart failure. Identifiable causes of hypertension include chronic renal disease.   Hyperlipidemia  This is a chronic problem. The current episode started more than 1 year ago. Exacerbating diseases include chronic renal disease. He has no history of obesity. Pertinent negatives include no chest pain or shortness of breath. Current antihyperlipidemic treatment includes statins. Risk factors for coronary artery disease include male sex, obesity, hypertension and dyslipidemia.     I have reviewed and confirmed the accuracy of the ROS  CAROL Perez    Objective     Current Outpatient Medications:     aspirin 81 MG tablet, Take 1 tablet by mouth Daily., Disp: , Rfl:     clopidogrel (PLAVIX) 75 MG tablet, Take 1 tablet by mouth Daily., Disp: , Rfl:     colchicine 0.6 MG tablet, Take 1 tablet by mouth Daily., Disp: , Rfl:     febuxostat (ULORIC) 40 MG tablet, Take 1 tablet by mouth Daily., Disp: , Rfl:     Inclisiran Sodium (Leqvio) 284 MG/1.5ML solution prefilled syringe, Inject  under the skin into the appropriate area as directed., Disp: , Rfl:     metoprolol succinate XL (TOPROL-XL) 25 MG 24 hr tablet, Take 1 tablet by mouth Daily., Disp: 90 tablet, Rfl: 3    pantoprazole (PROTONIX) 40 MG EC tablet, Take 1 tablet by mouth Every Morning., Disp: 90 tablet, Rfl: 3    sacubitril-valsartan (Entresto) 49-51 MG tablet, Take 1 tablet by  "mouth 2 (Two) Times a Day., Disp: 30 tablet, Rfl: 0  Vital Signs:   /86   Pulse 61   Ht 177.8 cm (70\")   Wt 91.6 kg (202 lb)   SpO2 98%   BMI 28.98 kg/m²     Vitals and nursing note reviewed.   Constitutional:       General: Awake.      Appearance: Normal and healthy appearance. Well-developed and not in distress. Obese.   Eyes:      General: Lids are normal.      Conjunctiva/sclera: Conjunctivae normal.      Pupils: Pupils are equal, round, and reactive to light.   HENT:      Head: Normocephalic and atraumatic.      Nose: Nose normal.   Neck:      Vascular: No JVR. JVD normal.   Pulmonary:      Effort: Pulmonary effort is normal.      Breath sounds: Normal breath sounds. No decreased breath sounds. No wheezing. No rhonchi. No rales.   Chest:      Chest wall: Not tender to palpatation.   Cardiovascular:      PMI at left midclavicular line. Normal rate. Regular rhythm. Normal S1. Normal S2.       Murmurs: There is no murmur.      No gallop.  No click. No rub.   Pulses:     Intact distal pulses.   Edema:     Peripheral edema absent.   Abdominal:      General: Bowel sounds are normal.      Palpations: Abdomen is soft.      Tenderness: There is no abdominal tenderness.   Musculoskeletal: Normal range of motion.         General: No tenderness.      Cervical back: Normal range of motion. Skin:     General: Skin is warm and dry.   Neurological:      General: No focal deficit present.      Mental Status: Alert, oriented to person, place, and time and oriented to person, place and time.   Psychiatric:         Attention and Perception: Attention and perception normal.         Mood and Affect: Mood and affect normal.         Speech: Speech normal.         Behavior: Behavior normal. Behavior is cooperative.         Thought Content: Thought content normal.         Cognition and Memory: Cognition and memory normal.         Judgment: Judgment normal.        Result Review :   The following data was reviewed by: Toya BARBA" CAROL Ramey on 02/21/2025:  Common labs          11/4/2024    08:10   Common Labs   Glucose 150    BUN 25    Creatinine 1.75    Sodium 141    Potassium 4.1    Chloride 106    Calcium 9.6    Total Cholesterol 127    Triglycerides 153    HDL Cholesterol 31    LDL Cholesterol  69        Data reviewed: Cardiology studies 2d echo 7/5/22 and 9/6/2024, lexiscan 7/5/22 and 9/6/2024, holter monitor 7/5/22 and left heart catheterization 8/5/22      ECG 12 Lead    Date/Time: 2/21/2025 10:19 AM  Performed by: Toya Ramey APRN    Authorized by: Toya Ramey APRN  Comparison: compared with previous ECG from 9/17/2024  Similar to previous ECG  Rhythm: sinus rhythm  Rate: normal  BPM: 64  Conduction: left bundle branch block and 1st degree AV block  T inversion: II, III, aVF, V3, V4, V5 and V6  QRS axis: right    Clinical impression: abnormal EKG            Assessment and Plan    Diagnoses and all orders for this visit:    1. Chronic combined systolic and diastolic heart failure (HCC) (Primary)- EF 43.6 % on 2D echo 4/5/2023. NYHA class II.  Stage C.  Compensated. Reviewed signs and symptoms of CHF and what to report with the patient. Patient instructed to restrict sodium and weigh daily. Report weight gain of greater than 2 lbs overnight or 5 lbs in 1 week. Pt verbalized understanding of instructions and plan of care.  Continue Entresto and metoprolol succinate. Consider up titration of Entresto and/or metoprolol in the future, if tolerated.  Patient has failed Jardiance in the past due to diarrhea and nausea. He has failed spironolactone due to increased fatigue.     2. Coronary artery disease involving native coronary artery of native heart with stable angina pectoris- decreased stamina and weakness have been his anginal equivalent.  Negative Marlyou scan 9/6/2024.  Stable.     3. Primary hypertension-blood pressure is elevated in office today, however pt reports well controlled at home with readings consistently  lower than 130/80. Continue metoprolol succinate and Entresto. Monitor and record daily blood pressure. Report readings consistently higher than 130/80 or consistently lower than 100/60.     4. Hyperlipidemia LDL goal <70- LDL well controlled at 69 on 11/4/24.  Continue Leqvio.      5. Stage 3b chronic kidney disease (HCC)- patient is following with nephrology outpatient.  Stable.      6. S/P CABG x 1- LIMA to LAD 1/3/23 per Dr. Keith Griffin at [facility].  Patient continues on aspirin and Plavix.  Denies bleeding.    Follow Up   Return in about 6 months (around 8/21/2025) for Next scheduled follow up.  Patient was given instructions and counseling regarding his condition or for health maintenance advice. Please see specific information pulled into the AVS if appropriate.

## 2025-02-21 ENCOUNTER — OFFICE VISIT (OUTPATIENT)
Dept: CARDIOLOGY | Facility: CLINIC | Age: 80
End: 2025-02-21
Payer: MEDICARE

## 2025-02-21 VITALS
HEIGHT: 70 IN | HEART RATE: 61 BPM | WEIGHT: 202 LBS | SYSTOLIC BLOOD PRESSURE: 133 MMHG | OXYGEN SATURATION: 98 % | DIASTOLIC BLOOD PRESSURE: 86 MMHG | BODY MASS INDEX: 28.92 KG/M2

## 2025-02-21 DIAGNOSIS — Z95.1 S/P CABG X 1: ICD-10-CM

## 2025-02-21 DIAGNOSIS — I50.42 CHRONIC COMBINED SYSTOLIC AND DIASTOLIC HEART FAILURE: Primary | ICD-10-CM

## 2025-02-21 DIAGNOSIS — I10 PRIMARY HYPERTENSION: ICD-10-CM

## 2025-02-21 DIAGNOSIS — I25.118 CORONARY ARTERY DISEASE OF NATIVE ARTERY OF NATIVE HEART WITH STABLE ANGINA PECTORIS: ICD-10-CM

## 2025-02-21 DIAGNOSIS — E78.5 HYPERLIPIDEMIA LDL GOAL <70: ICD-10-CM

## 2025-02-21 DIAGNOSIS — N18.32 STAGE 3B CHRONIC KIDNEY DISEASE: ICD-10-CM

## 2025-02-21 RX ORDER — METOPROLOL SUCCINATE 25 MG/1
25 TABLET, EXTENDED RELEASE ORAL DAILY
Qty: 90 TABLET | Refills: 3 | Status: SHIPPED | OUTPATIENT
Start: 2025-02-21

## 2025-02-24 ENCOUNTER — DOCUMENTATION (OUTPATIENT)
Dept: CARDIOLOGY | Facility: CLINIC | Age: 80
End: 2025-02-24
Payer: MEDICARE

## 2025-02-24 NOTE — PROGRESS NOTES
"Pt is following with Dr. Karli Palmer with ophthalmology with plans for cataract surgery in the near future. Cardiac risk assessment has been requested.   Risk assessment- from a revised cardiac risk index standpoint, patient is high risk for a major cardiac event with this surgery. This is primarily because he has a known history of coronary/ischemic heart disease, congestive heart failure, and cerebrovascular disease, but no known history of insulin-dependent diabetes mellitus, or creatinine greater than 2. This correlates to a >11% estimated rate of myocardial infarction, pulmonary edema, ventricular fibrillation, cardiac arrest, or complete heart block. However, this is non-modifiable because he has no signs or symptoms of the following \"active cardiac conditions\"- acute coronary syndrome, acutely decompensated congestive heart failure, uncontrolled arrhythmias, or significant valvular disease. Therefore, recommend proceeding with the planned surgery without further delay.    It is acceptable for this patient to hold Plavix for >5 days prior to surgery/invasive procedure per  recommendations, as this patient has not had a coronary artery stent placed within the last 12 months. Resume Plavix as soon as possible. Continue aspirin 81 mg daily without interruption.       "

## 2025-03-19 ENCOUNTER — TELEPHONE (OUTPATIENT)
Dept: CARDIOLOGY | Facility: CLINIC | Age: 80
End: 2025-03-19

## 2025-03-19 NOTE — TELEPHONE ENCOUNTER
Caller: Mitzy Philip    Relationship: Emergency Contact    Best call back number: [phone]    What is the best time to reach you: ANY    Who are you requesting to speak with (clinical staff, provider,  specific staff member): CAROL RAMÍREZ    What was the call regarding: PATIENT'S PCP INCREASED HIS METOPROLOL TO 50MG.  HIS BP LAST WEEK WAS A LITTLE HIGH A FEW DAYS AND HE FELT DIZZY.

## 2025-04-21 ENCOUNTER — TELEPHONE (OUTPATIENT)
Dept: CARDIOLOGY | Facility: CLINIC | Age: 80
End: 2025-04-21

## 2025-04-21 NOTE — TELEPHONE ENCOUNTER
Caller: DAVID WILLIS    Relationship:PT'S WIFE    Callback number: [phone]   Is it ok to leave a message: [x] Yes [] No    Requested medication for samples: ENTRESTO    How much medication does the patient currently have left: ABOUT A WEEKS WORTH    Who will be picking up the samples: DAVID WILLIS, AFTER 2    Do you need information about patient financial assistance for this medication: [] Yes [x] No    Additional details provided: PT'S WIFE CALLED IN TO REQUEST A SAMPLE FOR THE PT. HE IS IN [place] DUE TO HIS GRANDSON BEING IN THE HOSPITAL AND DOES NOT PLAN ON LEAVING UNTIL HE IS BETTER. WIFE STATES SHE WORRIES THAT HE WILL BE OUT BEFORE THEY WILL BE ABLE TO  THE REFILL, SO SHE WOULD LIKE TO  A SAMPLE TOMORROW AROUND 2:30. PLS CALL PT'S WIFE TO FOR ANY FURTHER QUESTION OR CONCERNS

## 2025-05-19 ENCOUNTER — TELEPHONE (OUTPATIENT)
Dept: CARDIOLOGY | Facility: CLINIC | Age: 80
End: 2025-05-19

## 2025-05-19 NOTE — TELEPHONE ENCOUNTER
Caller: Mitzy Philip     Relationship: WIFE    Best call back number: [phone]    What is your medical concern? PT'S WIFE CALLED IN WANTING TO GET MILO'S OPINION. PT HAS INJECTION SCHEDULED FOR CHOLESTEROL ON FRIDAY 5.23.25, BUT PT AND WIFE WILL BE OUT OF TOWN ON THAT DAY. THE NEXT [facility] APPT FOR THE INJECTION IS NOT UNTIL 5.30.25 AND SO THEY ARE JUST WONDERING IF WAITING UNTIL THEN WILL BE OKAY AND SAFE FOR PT TO DO SO.     PLS CALL PT'S WIFE TO ADVISE - THANKS

## 2025-05-23 ENCOUNTER — INFUSION (OUTPATIENT)
Dept: ONCOLOGY | Facility: HOSPITAL | Age: 80
End: 2025-05-23
Payer: MEDICARE

## 2025-05-23 VITALS
TEMPERATURE: 96.9 F | OXYGEN SATURATION: 98 % | DIASTOLIC BLOOD PRESSURE: 81 MMHG | SYSTOLIC BLOOD PRESSURE: 160 MMHG | HEART RATE: 61 BPM | RESPIRATION RATE: 18 BRPM

## 2025-05-23 DIAGNOSIS — E78.5 HYPERLIPIDEMIA LDL GOAL <70: Primary | ICD-10-CM

## 2025-05-23 PROCEDURE — 96372 THER/PROPH/DIAG INJ SC/IM: CPT

## 2025-05-23 PROCEDURE — 25010000002 INCLISIRAN SODIUM 284 MG/1.5ML SOLUTION PREFILLED SYRINGE: Performed by: NURSE PRACTITIONER

## 2025-05-23 RX ORDER — DIPHENHYDRAMINE HYDROCHLORIDE 50 MG/ML
50 INJECTION, SOLUTION INTRAMUSCULAR; INTRAVENOUS AS NEEDED
OUTPATIENT
Start: 2025-11-21

## 2025-05-23 RX ORDER — HYDROCORTISONE SODIUM SUCCINATE 100 MG/2ML
100 INJECTION INTRAMUSCULAR; INTRAVENOUS AS NEEDED
OUTPATIENT
Start: 2025-11-21

## 2025-05-23 RX ORDER — SODIUM CHLORIDE 9 MG/ML
20 INJECTION, SOLUTION INTRAVENOUS ONCE
OUTPATIENT
Start: 2025-11-21

## 2025-05-23 RX ORDER — DIPHENHYDRAMINE HYDROCHLORIDE 50 MG/ML
50 INJECTION, SOLUTION INTRAMUSCULAR; INTRAVENOUS AS NEEDED
Status: DISCONTINUED | OUTPATIENT
Start: 2025-05-23 | End: 2025-05-23 | Stop reason: HOSPADM

## 2025-05-23 RX ORDER — FAMOTIDINE 10 MG/ML
20 INJECTION, SOLUTION INTRAVENOUS AS NEEDED
OUTPATIENT
Start: 2025-11-21

## 2025-05-23 RX ORDER — SODIUM CHLORIDE 9 MG/ML
20 INJECTION, SOLUTION INTRAVENOUS ONCE
Status: DISCONTINUED | OUTPATIENT
Start: 2025-05-23 | End: 2025-05-23 | Stop reason: HOSPADM

## 2025-05-23 RX ORDER — HYDROCORTISONE SODIUM SUCCINATE 100 MG/2ML
100 INJECTION INTRAMUSCULAR; INTRAVENOUS AS NEEDED
Status: DISCONTINUED | OUTPATIENT
Start: 2025-05-23 | End: 2025-05-23 | Stop reason: HOSPADM

## 2025-05-23 RX ORDER — FAMOTIDINE 10 MG/ML
20 INJECTION, SOLUTION INTRAVENOUS AS NEEDED
Status: DISCONTINUED | OUTPATIENT
Start: 2025-05-23 | End: 2025-05-23 | Stop reason: HOSPADM

## 2025-05-23 RX ADMIN — INCLISIRAN 284 MG: 284 INJECTION, SOLUTION SUBCUTANEOUS at 09:11

## 2025-06-20 ENCOUNTER — TELEPHONE (OUTPATIENT)
Dept: CARDIOLOGY | Facility: CLINIC | Age: 80
End: 2025-06-20
Payer: MEDICARE

## 2025-06-20 NOTE — TELEPHONE ENCOUNTER
Caller: Mitzy Philip     Relationship: SPOUSE    Best call back number: [phone]     What is your medical concern? PATIENT'S WIFE REPORTS HIS BLOOD PRESSURE HAS BEEN ELEVATED. THE PATIENT'S PCP RAISED HIS BLOOD PRESSURE MEDICATION TO 50MG.    06.16.25 /89 HR 54  06.17.25 /96 HR 54  06.18.25 /95 HR 58  06.19.25 /92 HR 58  06.20.25 /90 HR 60    How long has this issue been going on? END OF LAST WEEK INTO THIS WEEK

## 2025-06-20 NOTE — TELEPHONE ENCOUNTER
Returned call to spouse. She reports that PCP has changed the metoprolol since last call in from 25 mg back to 50 mg. These readings are during the day. Spouse states Mr hPilip takes his metoprolol 50 mg tablet, prescribed by PCP, at bedtime along with all his other medications. Advised to contact PCP as well.

## 2025-06-23 NOTE — TELEPHONE ENCOUNTER
Spouse reports BP lower over the weekend since calling in 6/20/25. Patient has been taking 50 mg metoprolol for over a month, per spouse. Advised if numbers continued to elevated > 130/80 to report back to office.

## 2025-08-01 RX ORDER — PANTOPRAZOLE SODIUM 40 MG/1
40 TABLET, DELAYED RELEASE ORAL EVERY MORNING
Qty: 90 TABLET | Refills: 3 | Status: SHIPPED | OUTPATIENT
Start: 2025-08-01

## 2025-08-05 RX ORDER — PANTOPRAZOLE SODIUM 40 MG/1
40 TABLET, DELAYED RELEASE ORAL EVERY MORNING
Qty: 90 TABLET | Refills: 3 | Status: CANCELLED | OUTPATIENT
Start: 2025-08-05

## 2025-08-22 ENCOUNTER — PREP FOR SURGERY (OUTPATIENT)
Dept: OTHER | Facility: HOSPITAL | Age: 80
End: 2025-08-22
Payer: MEDICARE

## 2025-08-22 ENCOUNTER — OFFICE VISIT (OUTPATIENT)
Dept: CARDIOLOGY | Facility: CLINIC | Age: 80
End: 2025-08-22
Payer: MEDICARE

## 2025-08-22 VITALS
DIASTOLIC BLOOD PRESSURE: 94 MMHG | SYSTOLIC BLOOD PRESSURE: 164 MMHG | BODY MASS INDEX: 27.77 KG/M2 | WEIGHT: 194 LBS | HEART RATE: 56 BPM | HEIGHT: 70 IN | OXYGEN SATURATION: 98 %

## 2025-08-22 DIAGNOSIS — I25.110 CORONARY ARTERY DISEASE INVOLVING NATIVE CORONARY ARTERY OF NATIVE HEART WITH UNSTABLE ANGINA PECTORIS: Primary | ICD-10-CM

## 2025-08-22 DIAGNOSIS — E78.5 HYPERLIPIDEMIA LDL GOAL <70: ICD-10-CM

## 2025-08-22 DIAGNOSIS — I10 PRIMARY HYPERTENSION: ICD-10-CM

## 2025-08-22 DIAGNOSIS — I50.42 CHRONIC COMBINED SYSTOLIC AND DIASTOLIC HEART FAILURE: Primary | ICD-10-CM

## 2025-08-22 DIAGNOSIS — Z95.1 S/P CABG X 1: ICD-10-CM

## 2025-08-22 DIAGNOSIS — I25.110 CORONARY ARTERY DISEASE INVOLVING NATIVE CORONARY ARTERY OF NATIVE HEART WITH UNSTABLE ANGINA PECTORIS: ICD-10-CM

## 2025-08-22 DIAGNOSIS — N18.32 STAGE 3B CHRONIC KIDNEY DISEASE: ICD-10-CM

## 2025-08-22 RX ORDER — METOPROLOL SUCCINATE 50 MG/1
50 TABLET, EXTENDED RELEASE ORAL DAILY
Qty: 90 TABLET | Refills: 3 | Status: SHIPPED | OUTPATIENT
Start: 2025-08-22

## 2025-08-22 RX ORDER — SODIUM CHLORIDE 0.9 % (FLUSH) 0.9 %
10 SYRINGE (ML) INJECTION EVERY 12 HOURS SCHEDULED
OUTPATIENT
Start: 2025-08-22

## 2025-08-22 RX ORDER — SODIUM CHLORIDE 9 MG/ML
40 INJECTION, SOLUTION INTRAVENOUS AS NEEDED
OUTPATIENT
Start: 2025-08-22

## 2025-08-22 RX ORDER — SODIUM CHLORIDE 0.9 % (FLUSH) 0.9 %
10 SYRINGE (ML) INJECTION AS NEEDED
OUTPATIENT
Start: 2025-08-22

## 2025-08-22 RX ORDER — SACUBITRIL AND VALSARTAN 97; 103 MG/1; MG/1
1 TABLET, FILM COATED ORAL 2 TIMES DAILY
Qty: 60 TABLET | Refills: 11 | Status: SHIPPED | OUTPATIENT
Start: 2025-08-22

## (undated) DEVICE — GW STARTER FXD CORE J .035 3X150CM 3MM

## (undated) DEVICE — ELECTRD BLD MEGADYNE EZCLEAN STD 2.75IN XLNG

## (undated) DEVICE — GLOVE SURG SZ 8.5 L11.6IN FNGR THK12.6MIL CUF THK8.3MIL BRN

## (undated) DEVICE — TRAP FLD MINIVAC MEGADYNE 100ML

## (undated) DEVICE — GLV SURG BIOGEL M LTX PF 7 1/2

## (undated) DEVICE — CANN NASL ETCO2 LO/FLO A/

## (undated) DEVICE — MODEL BT2000 P/N 700287-012KIT CONTENTS: MANIFOLD WITH SALINE AND CONTRAST PORTS, SALINE TUBING WITH SPIKE AND HAND SYRINGE, TRANSDUCER: Brand: BT2000 AUTOMATED MANIFOLD KIT

## (undated) DEVICE — Device

## (undated) DEVICE — Device: Brand: POWER-FLO®

## (undated) DEVICE — DRSNG WND SIL OPTIFOAM GNTL BRDR ADHS 1.6X2IN

## (undated) DEVICE — ADHS LIQ MASTISOL 2/3ML

## (undated) DEVICE — BLAKE CARDIO CONNECTOR 1:1: Brand: J-VAC

## (undated) DEVICE — DRAIN,WOUND,ROUND,24FR,5/16",FULL-FLUTED: Brand: MEDLINE

## (undated) DEVICE — STERILE POLYISOPRENE POWDER-FREE SURGICAL GLOVES: Brand: PROTEXIS

## (undated) DEVICE — TRAY EPI 25GA L3.5IN 0.75% BIPIVCAIN 8.25% D CONTAIN BPA

## (undated) DEVICE — MARKR SKIN W/RULR AND LBL

## (undated) DEVICE — CATH IV ANGIOCATH FEP 14GA 1.88IN ORNG

## (undated) DEVICE — Z INACTIVE USE 2660664 SOLUTION IRRIG 3000ML 0.9% SOD CHL USP UROMATIC PLAS CONT

## (undated) DEVICE — PINNACLE INTRODUCER SHEATH: Brand: PINNACLE

## (undated) DEVICE — SUTURE VCRL SZ 2-0 L36IN ABSRB UD L36MM CT-1 1/2 CIR J945H

## (undated) DEVICE — BAPTIST TURNOVER KIT: Brand: MEDLINE INDUSTRIES, INC.

## (undated) DEVICE — OASIS DRAIN, SINGLE, INLINE & ATS COMPATIBLE: Brand: OASIS

## (undated) DEVICE — LARGE BONE HALL BLADE, RECIPROCATOR, 12.5 X 76 X 1.27 MM: Brand: HALL

## (undated) DEVICE — HYPODERMIC SAFETY NEEDLE: Brand: MAGELLAN

## (undated) DEVICE — GLOVE SURG SZ 75 L12IN FNGR THK87MIL DK GRN LTX FREE ISOLEX

## (undated) DEVICE — GLOVE SURG SZ 8 L12IN FNGR THK13MIL BRN LTX SYN POLYMER W

## (undated) DEVICE — SYS VASOVIEW HEMOPRO ENDOSCOPIC HARVST VESL

## (undated) DEVICE — SEALANT HEMSTAT 5ML HUM FIBRIN THROM 2 VI APPL DEV EVICEL

## (undated) DEVICE — KT NDL GUIDE STRL 18GA

## (undated) DEVICE — SYSTEM SKIN CLSR 22CM DERMBND PRINEO

## (undated) DEVICE — BLADE RMR L51MM PAT PILOT H

## (undated) DEVICE — KT ANTI FOG W/FLD AND SPNG

## (undated) DEVICE — ARM BOARD PAD: Brand: DEVON

## (undated) DEVICE — SYR LUERLOK 20CC BX/50

## (undated) DEVICE — PAD, DEFIB, ADULT, RADIOTRANS, PHYSIO: Brand: MEDLINE

## (undated) DEVICE — SUREFIT, DUAL DISPERSIVE ELECTRODE, CONTACT QUALITY MONITOR: Brand: SUREFIT

## (undated) DEVICE — SUT ETHIB 2/0 SH SH 36IN X523H

## (undated) DEVICE — STRIP,CLOSURE,WOUND,MEDI-STRIP,1/2X4: Brand: MEDLINE

## (undated) DEVICE — CLTH CLENS READYCLEANSE PERI CARE PK/5

## (undated) DEVICE — DRSNG SURESITE123 4X10IN

## (undated) DEVICE — SURGICAL PROCEDURE PACK KNEE TOT DBD CDS LOURDES HOSP LF

## (undated) DEVICE — MODEL AT P65, P/N 701554-001KIT CONTENTS: HAND CONTROLLER, 3-WAY HIGH-PRESSURE STOPCOCK WITH ROTATING END AND PREMIUM HIGH-PRESSURE TUBING: Brand: ANGIOTOUCH® KIT

## (undated) DEVICE — ELECTRD BLD EZ CLN MOD 4IN

## (undated) DEVICE — ZIMMER® STERILE DISPOSABLE TOURNIQUET CUFF WITH PLC, DUAL PORT, SINGLE BLADDER, 34 IN. (86 CM)

## (undated) DEVICE — SOLIDIFIER LIQUI LOC PLUS 2000CC

## (undated) DEVICE — STABILIZER TISS OCTOPUS EVOL

## (undated) DEVICE — SUT PROLN 4/0 RB1 36IN 4PK M8557

## (undated) DEVICE — 1/2 FORCE SURGICAL SPRING CLIP: Brand: STEALTH® SPRING CLIP

## (undated) DEVICE — SOLUTION IV 250ML 0.9% SOD CHL PH 5 INJ USP VIAFLX PLAS

## (undated) DEVICE — SUT SILK 2/0 FS BLK 18IN 685G

## (undated) DEVICE — OPTIFOAM GENTLE SA, POSTOP, 4X12: Brand: MEDLINE

## (undated) DEVICE — STERNUM BLADE, OFFSET (32.0 X 0.8 X 6.3MM)

## (undated) DEVICE — MCLASS OSCILLATING SAW BLADE 19 X 1.27 (0.050") X 90 MM: Brand: MCLASS

## (undated) DEVICE — SOLUTION IV IRRIG POUR BRL 0.9% SODIUM CHL 2F7124

## (undated) DEVICE — NON-WOVEN ADHESIVE WOUND DRESSING: Brand: PRIMAPORE ADHESIVE DRESSING 30*10CM

## (undated) DEVICE — GLV SURG BIOGEL LTX PF 6 1/2

## (undated) DEVICE — THREE QUARTER SHEET: Brand: CONVERTORS

## (undated) DEVICE — GOWN,PRECEPT,XLNG/XXLARGE,STRL: Brand: MEDLINE

## (undated) DEVICE — BLAKE SILICONE DRAINS CARDIO CONNECTOR 2:1: Brand: BLAKE

## (undated) DEVICE — DRAPE,SPLIT,CV,CLR ANES,STERILE: Brand: MEDLINE

## (undated) DEVICE — MYNXGRIP 6F/7F: Brand: MYNXGRIP

## (undated) DEVICE — FR5 INFINITI MULTIPAC: Brand: INFINITI

## (undated) DEVICE — SUTURE VCRL SZ 1 L18IN ABSRB UD L36MM CT-1 1/2 CIR J841D

## (undated) DEVICE — SUTURE VCRL SZ 2-0 L27IN ABSRB UD L26MM SH 1/2 CIR J417H

## (undated) DEVICE — PK OPN HEART 30

## (undated) DEVICE — ROTATING SURGICAL PUNCHES, 1 PER POUCH: Brand: A&E MEDICAL / ROTATING SURGICAL PUNCHES

## (undated) DEVICE — SOL IRR NACL 0.9PCT BT 1000ML

## (undated) DEVICE — SYRINGE MED 10ML POLYPR LUERSLIP TIP FLAT TOP W/O SFTY DISP

## (undated) DEVICE — BLOWER MISTER CLEARVIEW W/TBG

## (undated) DEVICE — E-PACK PROCEDURE KIT: Brand: E-PACK

## (undated) DEVICE — CHLORAPREP 26ML ORANGE

## (undated) DEVICE — 3M™ STERI-DRAPE™ INSTRUMENT POUCH 1018: Brand: STERI-DRAPE™

## (undated) DEVICE — SUT SILK 2/0 SH CR8 18IN CR8 C012D

## (undated) DEVICE — ANTIBACTERIAL UNDYED BRAIDED (POLYGLACTIN 910), SYNTHETIC ABSORBABLE SUTURE: Brand: COATED VICRYL

## (undated) DEVICE — PK SET UP ANES OPN HEART 30

## (undated) DEVICE — PK CATH CARD 30 CA/4

## (undated) DEVICE — SYS DISTNTION VEIN BONCHEK 300MMHG